# Patient Record
Sex: FEMALE | Race: WHITE | NOT HISPANIC OR LATINO | Employment: OTHER | URBAN - METROPOLITAN AREA
[De-identification: names, ages, dates, MRNs, and addresses within clinical notes are randomized per-mention and may not be internally consistent; named-entity substitution may affect disease eponyms.]

---

## 2017-01-31 ENCOUNTER — PATIENT OUTREACH (OUTPATIENT)
Dept: HEALTH INFORMATION MANAGEMENT | Facility: OTHER | Age: 82
End: 2017-01-31

## 2017-01-31 ENCOUNTER — TELEPHONE (OUTPATIENT)
Dept: HEALTH INFORMATION MANAGEMENT | Facility: OTHER | Age: 82
End: 2017-01-31

## 2017-01-31 NOTE — PROGRESS NOTES
Outcome:SCHEDULED AWV    Attempt # 1ST    Annual Wellness Visit Scheduling  Scheduling Status: Scheduled    Care Gap Scheduling (Attempt to Schedule EACH Overdue Care Gap!)  Health Maintenance Due   Topic Date Due   • PFT SCREENING-FEV1 AND FEV/FVC RATIO / SPIROMETRY SHOULD BE PERFORMED ANNUALLY  NA   • IMM DTaP/Tdap/Td Vaccine (1 - Tdap) DID NOT DISCUSS PT HUNG UP   • PAP SMEAR  75+ SENT TO PCP   • IMM ZOSTER VACCINE  DID NOT DISCUSS PT HUNG UP   • IMM PNEUMOCOCCAL 65+ (ADULT) LOW/MEDIUM RISK SERIES (1 of 2 - PCV13) DID NOT DISCUSS PT HUNG UP   • Annual Wellness Visit  SCHEDULED APPT   • IMM INFLUENZA (1) DID NOT DISCUSS PT HUNG UP         MyChart Activation:  Already Active/Declined/Sent Activation Code

## 2017-01-31 NOTE — TELEPHONE ENCOUNTER
Patient is over the age of 75 and showing overdue for PAP SMEAR.    Please reply to this message as to whether these tests are appropriate and I will update the Health Maintenance Topic or contact the patient to schedule.

## 2017-02-07 ENCOUNTER — OFFICE VISIT (OUTPATIENT)
Dept: MEDICAL GROUP | Facility: MEDICAL CENTER | Age: 82
End: 2017-02-07
Payer: MEDICARE

## 2017-02-07 VITALS
BODY MASS INDEX: 23.74 KG/M2 | SYSTOLIC BLOOD PRESSURE: 162 MMHG | TEMPERATURE: 97.9 F | DIASTOLIC BLOOD PRESSURE: 84 MMHG | HEIGHT: 63 IN | HEART RATE: 75 BPM | WEIGHT: 134 LBS | OXYGEN SATURATION: 90 %

## 2017-02-07 DIAGNOSIS — J45.40 ASTHMA, MODERATE PERSISTENT, POORLY-CONTROLLED: ICD-10-CM

## 2017-02-07 DIAGNOSIS — R11.0 NAUSEA: ICD-10-CM

## 2017-02-07 DIAGNOSIS — R35.0 URINARY FREQUENCY: ICD-10-CM

## 2017-02-07 DIAGNOSIS — Z91.81 AT RISK FOR FALLS: ICD-10-CM

## 2017-02-07 DIAGNOSIS — Z00.00 MEDICARE ANNUAL WELLNESS VISIT, SUBSEQUENT: Primary | ICD-10-CM

## 2017-02-07 DIAGNOSIS — G47.34 NOCTURNAL HYPOXIA: ICD-10-CM

## 2017-02-07 DIAGNOSIS — I10 ESSENTIAL HYPERTENSION: ICD-10-CM

## 2017-02-07 DIAGNOSIS — N18.30 CHRONIC KIDNEY DISEASE, STAGE III (MODERATE) (HCC): ICD-10-CM

## 2017-02-07 DIAGNOSIS — N39.0 RECURRENT UTI: ICD-10-CM

## 2017-02-07 DIAGNOSIS — F43.21 GRIEF REACTION: ICD-10-CM

## 2017-02-07 DIAGNOSIS — E55.9 VITAMIN D INSUFFICIENCY: ICD-10-CM

## 2017-02-07 DIAGNOSIS — N28.9 RENAL INSUFFICIENCY: ICD-10-CM

## 2017-02-07 PROCEDURE — 1036F TOBACCO NON-USER: CPT | Performed by: NURSE PRACTITIONER

## 2017-02-07 PROCEDURE — G8510 SCR DEP NEG, NO PLAN REQD: HCPCS | Performed by: NURSE PRACTITIONER

## 2017-02-07 PROCEDURE — G0439 PPPS, SUBSEQ VISIT: HCPCS | Performed by: NURSE PRACTITIONER

## 2017-02-07 ASSESSMENT — PATIENT HEALTH QUESTIONNAIRE - PHQ9
5. POOR APPETITE OR OVEREATING: 1 - SEVERAL DAYS
SUM OF ALL RESPONSES TO PHQ QUESTIONS 1-9: 13
CLINICAL INTERPRETATION OF PHQ2 SCORE: 3

## 2017-02-07 NOTE — ASSESSMENT & PLAN NOTE
Chronic condition managed with current medical regimen  Intermit occurrence  Followed by Roberto Cota M.D..

## 2017-02-07 NOTE — ASSESSMENT & PLAN NOTE
Chronic condition managed with current medical regimen  Diet managed  Followed by Roberto Cota M.D..

## 2017-02-07 NOTE — ASSESSMENT & PLAN NOTE
Per pt unable to determine the cause of nausea, when occurs short lived and med effective  Followed by Roberto Cota M.D..

## 2017-02-07 NOTE — ASSESSMENT & PLAN NOTE
"Pt states has had increasing fatigue during the winter weather  dtr states since loss of her dog 10/2016 more tired and \"sad\"  "

## 2017-02-07 NOTE — ASSESSMENT & PLAN NOTE
/84  Chronic condition managed with current medical regimen  Stable per review   Continue with current meds  Followed by Roberto Cota M.D..

## 2017-02-07 NOTE — ASSESSMENT & PLAN NOTE
Chronic, intermit condition managed with current medical regimen  Followed by Roberto Cota M.D..

## 2017-02-07 NOTE — ASSESSMENT & PLAN NOTE
Chronic condition managed with current medical regimen  No current labs for revieew  Followed by Roberto Cota M.D..

## 2017-02-07 NOTE — ASSESSMENT & PLAN NOTE
Chronic condition managed with current medical regimen  SOB with mild exertion  Hs O2 2.5 L   Continue with current meds  Followed by Roberto Cota M.D..

## 2017-02-07 NOTE — MR AVS SNAPSHOT
"Kareenprema FOX Shukri   2017 1:00 PM   Office Visit   MRN: [id number]    Department:  [facility]   Dept Phone:  [phone]    Description:  Female : 1921   Provider:  DICK Smith           Reason for Visit     Annual Exam initial      Allergies as of 2017     Allergen Noted Reactions    Codeine 2009   Nausea      You were diagnosed with     Medicare annual wellness visit, subsequent   [019351]  -  Primary     Asthma, moderate persistent, poorly-controlled   [615432]       Essential hypertension   [0125452]       Vitamin D insufficiency   [721343]       Renal insufficiency   [374636]       Urinary frequency   [788.41.ICD-9-CM]       Chronic kidney disease, stage III (moderate)   [585.3.ICD-9-CM]       Grief reaction   [653956]       Nocturnal hypoxia   [084527]       At risk for falls   [889724]       Recurrent UTI   [927417]       Nausea   [914620]         Vital Signs     Blood Pressure Pulse Temperature Height Weight Body Mass Index    162/84 mmHg 75 36.6 °C (97.9 °F) 1.6 m (5' 3\") 60.782 kg (134 lb) 23.74 kg/m2    Oxygen Saturation Smoking Status                90% Former Smoker          Basic Information     Date Of Birth Sex Race Ethnicity Preferred Language    1921 Female White Non- English      Problem List              ICD-10-CM Priority Class Noted - Resolved    Asthma, moderate persistent, poorly-controlled J45.40   2010 - Present    COPD (chronic obstructive pulmonary disease) (CMS-HCC) J44.9   2010 - Present    HTN (hypertension) I10   2010 - Present    Vitamin D insufficiency E55.9   2010 - Present    Depression F32.9   2010 - Present    Fatigue R53.83   2012 - Present    Hyperlipidemia E78.5   2012 - Present    Grief reaction F43.20   2013 - Present    Insomnia G47.00   2014 - Present    Nocturnal hypoxia G47.34   2014 - Present    Urinary frequency R35.0   2014 - Present    Constipation K59.00   " 3/2/2015 - Present    At risk for falls Z91.81   7/10/2015 - Present    Recurrent UTI N39.0   8/18/2015 - Present    Nausea R11.0   8/18/2015 - Present    Chronic kidney disease, stage III (moderate) N18.3   8/18/2015 - Present      Health Maintenance        Date Due Completion Dates    IMM DTaP/Tdap/Td Vaccine (1 - Tdap) 2/5/1940 ---    IMM ZOSTER VACCINE 2/5/1981 ---    IMM PNEUMOCOCCAL 65+ (ADULT) LOW/MEDIUM RISK SERIES (1 of 2 - PCV13) 2/5/1986 ---    IMM INFLUENZA (1) 9/1/2016 11/18/2014, 10/16/2012, 12/3/2010            Current Immunizations     Influenza TIV (IM) 10/16/2012  3:50 PM    Influenza Vaccine Adult HD 11/18/2014  5:07 PM    Influenza Vaccine Pediatric 12/3/2010      Below and/or attached are the medications your provider expects you to take. Review all of your home medications and newly ordered medications with your provider and/or pharmacist. Follow medication instructions as directed by your provider and/or pharmacist. Please keep your medication list with you and share with your provider. Update the information when medications are discontinued, doses are changed, or new medications (including over-the-counter products) are added; and carry medication information at all times in the event of emergency situations     Allergies:  CODEINE - Nausea               Medications  Valid as of: February 07, 2017 -  1:35 PM    Generic Name Brand Name Tablet Size Instructions for use    Albuterol Sulfate (Aero Soln) albuterol 108 (90 BASE) MCG/ACT Inhale 2 Puffs by mouth every 6 hours as needed.        AmLODIPine Besylate (Tab) NORVASC 5 MG Take 1 Tab by mouth 2 Times a Day.        Cholecalciferol (Cap) Vitamin D 2000 UNITS Take 1 Cap by mouth every day.        Lisinopril (Tab) PRINIVIL, ZESTRIL 40 MG Take 1.5 Tabs by mouth every morning.        Ondansetron HCl (Tab) ZOFRAN 4 MG Take 1 Tab by mouth every four hours as needed for Nausea/Vomiting.        PredniSONE (Tab) DELTASONE 5 MG TAKE 1 TAB BY MOUTH  EVERY MORNING WITH BREAKFAST.        Sertraline HCl (Tab) ZOLOFT 100 MG Take 1.5 Tabs by mouth every day.        TraZODone HCl (Tab) DESYREL 100 MG Take 1 Tab by mouth at bedtime as needed for Sleep (take with a snack).        .                 Medicines prescribed today were sent to:     [id number] - [place], [place] - [address]    [address] [place] [address]    Phone: [phone] Fax: [phone]    Open 24 Hours?: No    [id number] - [place], [place] - [address]    [address] [place] [address]    Phone: [phone] Fax: [phone]    Open 24 Hours?: No      Medication refill instructions:       If your prescription bottle indicates you have medication refills left, it is not necessary to call your provider’s office. Please contact your pharmacy and they will refill your medication.    If your prescription bottle indicates you do not have any refills left, you may request refills at any time through one of the following ways: The online [employer] system (except Urgent Care), by calling your provider’s office, or by asking your pharmacy to contact your provider’s office with a refill request. Medication refills are processed only during regular business hours and may not be available until the next business day. Your provider may request additional information or to have a follow-up visit with you prior to refilling your medication.   *Please Note: Medication refills are assigned a new Rx number when refilled electronically. Your pharmacy may indicate that no refills were authorized even though a new prescription for the same medication is available at the pharmacy. Please request the medicine by name with the pharmacy before contacting your provider for a refill.        Instructions    Continue with care through Roberto Cota M.D..  Next Medicare Annual Wellness Visit is due in one year.    Continue care with specialists you are seeing for your chronic problems.    Attached is  some preventative information for you to read.          Fall Prevention and Home Safety  Falls cause injuries and can affect all age groups. It is possible to prevent falls.   HOW TO PREVENT FALLS  · Wear shoes with rubber soles that do not have an opening for your toes.   · Keep the inside and outside of your house well lit.   · Use night lights throughout your home.   · Remove clutter from floors.   · Clean up floor spills.   · Remove throw rugs or fasten them to the floor with carpet tape.   · Do not place electrical cords across pathways.   · Put grab bars by your tub, shower, and toilet. Do not use towel bars as grab bars.   · Put handrails on both sides of the stairway. Fix loose handrails.   · Do not climb on stools or stepladders, if possible.   · Do not wax your floors.   · Repair uneven or unsafe sidewalks, walkways, or stairs.   · Keep items you use a lot within reach.   · Be aware of pets.   · Keep emergency numbers next to the telephone.   · Put smoke detectors in your home and near bedrooms.   Ask your doctor what other things you can do to prevent falls.  Document Released: 10/14/2010 Document Revised: 06/18/2013 Document Reviewed: 03/19/2013  ExitCare® Patient Information ©2013 [employer].    Exercise to Stay Healthy      Exercise helps you become and stay healthy.  EXERCISE IDEAS AND TIPS  Choose exercises that:  · You enjoy.   · Fit into your day.   You do not need to exercise really hard to be healthy. You can do exercises at a slow or medium level and stay healthy. You can:  · Stretch before and after working out.   · Try yoga, Pilates, or aby chi.   · Lift weights.   · Walk fast, swim, jog, run, climb stairs, bicycle, dance, or rollerskate.   · Take aerobic classes.   Exercises that burn about 150 calories:  · Running 1 ½ miles in 15 minutes.   · Playing volleyball for 45 to 60 minutes.   · Washing and waxing a car for 45 to 60 minutes.   · Playing touch football for 45 minutes.   · Walking 1  ¾ miles in 35 minutes.   · Pushing a stroller 1 ½ miles in 30 minutes.   · Playing basketball for 30 minutes.   · Raking leaves for 30 minutes.   · Bicycling 5 miles in 30 minutes.   · Walking 2 miles in 30 minutes.   · Dancing for 30 minutes.   · Shoveling snow for 15 minutes.   · Swimming laps for 20 minutes.   · Walking up stairs for 15 minutes.   · Bicycling 4 miles in 15 minutes.   · Gardening for 30 to 45 minutes.   · Jumping rope for 15 minutes.   · Washing windows or floors for 45 to 60 minutes.     One suggestion is to start walking for 10 minutes after each meal.  This will help with digestion and help you to metabolize your meal.       For Weight Loss -   Recommend portion sizes with more fruits/vegetables/high fiber foods.  Stay away from white bread/pastas/white rice/white potatoes.  Increase Fluid intake to 6-8 glasses of water daily.   Eliminate soda's (diet and regular) from your fluid intake.      Document Released: 01/20/2012 Document Revised: 03/11/2013 Document Reviewed: 01/20/2012  ExitCare® Patient Information ©2013 [employer].    Fat and Cholesterol Control Diet  Cholesterol is a wax-like substance. It comes from your liver and is found in certain foods. There is good (HDL) and bad (LDL) cholesterol. Too much cholesterol in your blood can affect your heart. Certain foods can lower or raise your cholesterol. Eat foods that are low in cholesterol.  Saturated and trans fats are bad fats found in foods that will raise your cholesterol. Do not eat foods that are high in saturated and trans fats.  FOODS HIGHER IN SATURATED AND TRANS FATS  · Dairy products, such as whole milk, eggs, cheese, cream, and butter.   · Fatty meats, such as hot dogs, sausage, and salami.   · Fried foods.   · Trans fats which are found in margarine and pre-made cookies, crackers, and baked goods.   · Tropical oils, such as coconut and palm oils.   Read package labels at the store. Do not buy products that use saturated or  trans fats or hydrogenated oils. Find foods labeled:  · Low-fat.   · Low-saturated fat.   · Trans-fat-free.   · Low-cholesterol.   FOODS LOWER IN CHOLESTEROL  ·  Fruit.   · Vegetables.   · Beans, peas, and lentils.   · Fish.   · Lean meat, such as chicken (without skin) or ground turkey.   · Grains, such as barley, rice, couscous, bulgur wheat, and pasta.   · Heart-healthy tub margarine.   PREPARING YOUR FOOD  · Broil, bake, steam, or roast foods. Do not lockwood food.   · Use non-stick cooking sprays.   · Use lemon or herbs to flavor food instead of using butter or stick margarine.   · Use nonfat yogurt, salsa, or low-fat dressings for salads.   Document Released: 06/18/2013 Document Reviewed: 06/18/2013  ExitCare® Patient Information ©2013 [employer].    Recommend annual flu vaccine.  Next due in Fall, 2015.  If you decide not to have the flu vaccine, recommend good handwashing and staying out of crowds during flu season.                    MyChart Status: Patient Declined

## 2017-02-07 NOTE — PROGRESS NOTES
CC:   Medicare Annual Wellness Visit    HPI:  Laney is a 89 y.o. here for Medicare Annual Wellness Visit    Patient Active Problem List    Diagnosis Date Noted   • Recurrent UTI 08/18/2015   • Nausea 08/18/2015   • Chronic kidney disease, stage III (moderate) 08/18/2015   • At risk for falls 07/10/2015   • Constipation 03/02/2015   • Urinary frequency 05/29/2014   • Insomnia 01/30/2014   • Nocturnal hypoxia 01/30/2014   • Grief reaction 04/16/2013   • Hyperlipidemia 12/13/2012   • Renal insufficiency 07/06/2012   • Fatigue 06/09/2012   • Asthma, moderate persistent, poorly-controlled 09/02/2010   • COPD (chronic obstructive pulmonary disease) (CMS-[facility]) 09/02/2010   • HTN (hypertension) 09/02/2010   • Vitamin D insufficiency 09/02/2010   • Depression 09/02/2010     Current Outpatient Prescriptions   Medication Sig Dispense Refill   • predniSONE (DELTASONE) 5 MG Tab TAKE 1 TAB BY MOUTH EVERY MORNING WITH BREAKFAST. 90 Tab 0   • trazodone (DESYREL) 100 MG Tab Take 1 Tab by mouth at bedtime as needed for Sleep (take with a snack). 90 Tab 3   • sertraline (ZOLOFT) 100 MG Tab Take 1.5 Tabs by mouth every day. 135 Tab 2   • lisinopril (PRINIVIL, ZESTRIL) 40 MG tablet Take 1.5 Tabs by mouth every morning. 135 Tab 2   • ondansetron (ZOFRAN) 4 MG Tab tablet Take 1 Tab by mouth every four hours as needed for Nausea/Vomiting. 20 Tab 2   • amlodipine (NORVASC) 5 MG Tab Take 1 Tab by mouth 2 Times a Day. 180 Tab 3   • ondansetron (ZOFRAN) 8 MG Tab Take 1 Tab by mouth every 8 hours as needed for Nausea/Vomiting. 15 Tab 0   • Cholecalciferol (VITAMIN D) 2000 UNIT CAPS Take 1 Cap by mouth every day.     • albuterol (PROAIR HFA) 108 (90 BASE) MCG/ACT AERS Inhale 2 Puffs by mouth every 6 hours as needed. 2 Inhaler 4     No current facility-administered medications for this visit.      Current supplements: no  Chronic narcotic pain medicines: no  Allergies: Codeine  Exercise: as shania  Current social contact/activities: Has support of  family and friends      Screening:  Depression Screening    Little interest or pleasure in doing things?     Feeling down, depressed , or hopeless?    Trouble falling or staying asleep, or sleeping too much?     Feeling tired or having little energy?     Poor appetite or overeating?     Feeling bad about yourself - or that you are a failure or have let yourself or your family down?    Trouble concentrating on things, such as reading the newspaper or watching television?    Moving or speaking so slowly that other people could have noticed.  Or the opposite - being so fidgety or restless that you have been moving around a lot more than usual?     Thoughts that you would be better off dead, or of hurting yourself?     Patient Health Questionnaire Score:      If depressive symptoms identified deferred to follow up visit unless specifically addressed in assesment and plan.      Screening for Cognitive Impairment    Three Minute Recall (banana, sunrise, fence)   /3    Draw clock face with all 12 numbers set to the hand to show 10 minures past 11 o'clock       Cognitive concerns identified defferred for follow up unless specifically addressed in assesment and plan.    Fall Risk Assessment    Has the patient had two or more falls in the last year or any fall with injury in the last year?       Safety Assessment    Throw rugs on floor.     Handrails on all stairs.     Good lighting in all hallways.     Difficulty hearing.     Patient counseled about all safety risks that were identified.    Functional Assessment ADLs    Are there any barriers preventing you from cooking for yourself or meeting nutritional needs?   .    Are there any barriers preventing you from driving safely or obtaining transportation?   .    Are there any barriers preventing you from using a telephone or calling for help?   .    Are there any barriers preventing you from shopping?   .    Are there any barriers preventing you from taking care of your own  finances?   .    Are there any barriers preventing you from managing your medications?   .    Are currently engaing any exercise or physical activity?   .       Health Maintenance Summary                PFT SCREENING-FEV1 AND FEV/FVC RATIO / SPIROMETRY SHOULD BE PERFORMED ANNUALLY Overdue 2/5/1939     IMM DTaP/Tdap/Td Vaccine Overdue 2/5/1940     IMM ZOSTER VACCINE Overdue 2/5/1981     IMM PNEUMOCOCCAL 65+ (ADULT) LOW/MEDIUM RISK SERIES Overdue 2/5/1986     Annual Wellness Visit Overdue 7/10/2016      Done 7/10/2015 Visit Dx: Medicare annual wellness visit, subsequent    IMM INFLUENZA Overdue 9/1/2016      Done 11/18/2014 Imm Admin: Influenza Vaccine Adult HD     Patient has more history with this topic...          Patient Care Team:  Roberto Cota M.D. as PCP - General (Family Medicine)  Daniel Duran M.D. as Consulting Physician (Cardiology)        Social History   Substance Use Topics   • Smoking status: Former Smoker -- 0.25 packs/day for 20 years     Types: Cigarettes     Quit date: 07/10/1965   • Smokeless tobacco: Never Used      Comment: 20 years, 1/2 ppd   • Alcohol Use: 3.0 oz/week     6 Glasses of wine per week       Family History   Problem Relation Age of Onset   • Diabetes Mother    • Asthma Father      She  has a past medical history of Hypertension; ASTHMA; Depression; Insomnia; Hypoxia; and Arthritis.   Past Surgical History   Procedure Laterality Date   • Hysterectomy, total abdominal       Complete, FREDDY/BSO   • Appendectomy     • Tonsillectomy         ROS:    Ostomy or other tubes or amputations: no  Chronic oxygen use yes  Last eye exam 2016  : Denies incontinence.   Gait: Uses a cane   Hearing good.    Dentition good    Exam: There were no vitals taken for this visit. There is no weight on file to calculate BMI.  Alert, oriented in no acute distress.  Eye contact is good, speech goal directed, affect calm      Assessment and Plan. The following treatment and monitoring plan is recommended  "for all problems as listed below:   No problem-specific assessment & plan notes found for this encounter.      Health Care Screening recommendations reviewed with patient today: per Patient Instructions  DPA/Advanced directive: .has a living will    Next office visit for recheck of chronic medical conditions is due with Roberto Cota M.D. in 4-6 months        Pt unable to receive flu and pneumonia shots, currently pr dtr \"with a bad cold, cough\"  "

## 2017-02-07 NOTE — PATIENT INSTRUCTIONS
Continue with care through Roberto Cota M.D..  Next Medicare Annual Wellness Visit is due in one year.    Continue care with specialists you are seeing for your chronic problems.    Attached is some preventative information for you to read.          Fall Prevention and Home Safety  Falls cause injuries and can affect all age groups. It is possible to prevent falls.   HOW TO PREVENT FALLS  · Wear shoes with rubber soles that do not have an opening for your toes.   · Keep the inside and outside of your house well lit.   · Use night lights throughout your home.   · Remove clutter from floors.   · Clean up floor spills.   · Remove throw rugs or fasten them to the floor with carpet tape.   · Do not place electrical cords across pathways.   · Put grab bars by your tub, shower, and toilet. Do not use towel bars as grab bars.   · Put handrails on both sides of the stairway. Fix loose handrails.   · Do not climb on stools or stepladders, if possible.   · Do not wax your floors.   · Repair uneven or unsafe sidewalks, walkways, or stairs.   · Keep items you use a lot within reach.   · Be aware of pets.   · Keep emergency numbers next to the telephone.   · Put smoke detectors in your home and near bedrooms.   Ask your doctor what other things you can do to prevent falls.  Document Released: 10/14/2010 Document Revised: 06/18/2013 Document Reviewed: 03/19/2013  ExitCare® Patient Information ©2013 [employer].    Exercise to Stay Healthy      Exercise helps you become and stay healthy.  EXERCISE IDEAS AND TIPS  Choose exercises that:  · You enjoy.   · Fit into your day.   You do not need to exercise really hard to be healthy. You can do exercises at a slow or medium level and stay healthy. You can:  · Stretch before and after working out.   · Try yoga, Pilates, or aby chi.   · Lift weights.   · Walk fast, swim, jog, run, climb stairs, bicycle, dance, or rollerskate.   · Take aerobic classes.   Exercises that burn about 150  calories:  · Running 1 ½ miles in 15 minutes.   · Playing volleyball for 45 to 60 minutes.   · Washing and waxing a car for 45 to 60 minutes.   · Playing touch football for 45 minutes.   · Walking 1 ¾ miles in 35 minutes.   · Pushing a stroller 1 ½ miles in 30 minutes.   · Playing basketball for 30 minutes.   · Raking leaves for 30 minutes.   · Bicycling 5 miles in 30 minutes.   · Walking 2 miles in 30 minutes.   · Dancing for 30 minutes.   · Shoveling snow for 15 minutes.   · Swimming laps for 20 minutes.   · Walking up stairs for 15 minutes.   · Bicycling 4 miles in 15 minutes.   · Gardening for 30 to 45 minutes.   · Jumping rope for 15 minutes.   · Washing windows or floors for 45 to 60 minutes.     One suggestion is to start walking for 10 minutes after each meal.  This will help with digestion and help you to metabolize your meal.       For Weight Loss -   Recommend portion sizes with more fruits/vegetables/high fiber foods.  Stay away from white bread/pastas/white rice/white potatoes.  Increase Fluid intake to 6-8 glasses of water daily.   Eliminate soda's (diet and regular) from your fluid intake.      Document Released: 01/20/2012 Document Revised: 03/11/2013 Document Reviewed: 01/20/2012  ExitCare® Patient Information ©2013 [employer].    Fat and Cholesterol Control Diet  Cholesterol is a wax-like substance. It comes from your liver and is found in certain foods. There is good (HDL) and bad (LDL) cholesterol. Too much cholesterol in your blood can affect your heart. Certain foods can lower or raise your cholesterol. Eat foods that are low in cholesterol.  Saturated and trans fats are bad fats found in foods that will raise your cholesterol. Do not eat foods that are high in saturated and trans fats.  FOODS HIGHER IN SATURATED AND TRANS FATS  · Dairy products, such as whole milk, eggs, cheese, cream, and butter.   · Fatty meats, such as hot dogs, sausage, and salami.   · Fried foods.   · Trans fats which  are found in margarine and pre-made cookies, crackers, and baked goods.   · Tropical oils, such as coconut and palm oils.   Read package labels at the store. Do not buy products that use saturated or trans fats or hydrogenated oils. Find foods labeled:  · Low-fat.   · Low-saturated fat.   · Trans-fat-free.   · Low-cholesterol.   FOODS LOWER IN CHOLESTEROL  ·  Fruit.   · Vegetables.   · Beans, peas, and lentils.   · Fish.   · Lean meat, such as chicken (without skin) or ground turkey.   · Grains, such as barley, rice, couscous, bulgur wheat, and pasta.   · Heart-healthy tub margarine.   PREPARING YOUR FOOD  · Broil, bake, steam, or roast foods. Do not lockwood food.   · Use non-stick cooking sprays.   · Use lemon or herbs to flavor food instead of using butter or stick margarine.   · Use nonfat yogurt, salsa, or low-fat dressings for salads.   Document Released: 06/18/2013 Document Reviewed: 06/18/2013  ExitCare® Patient Information ©2013 [employer].    Recommend annual flu vaccine.  Next due in Fall, 2015.  If you decide not to have the flu vaccine, recommend good handwashing and staying out of crowds during flu season.

## 2017-02-07 NOTE — ASSESSMENT & PLAN NOTE
Chronic condition managed with current medical regimen  O2 2.5 L at hs  Followed by Roberto Cota M.D..

## 2017-02-07 NOTE — ASSESSMENT & PLAN NOTE
"Chronic condition managed with current medical regimen  Per dtr and pt \"not always very good\"  Used her inhaler during this visit with good results   Continue with current meds  Followed by Roberto Cota M.D..      "

## 2017-02-07 NOTE — ASSESSMENT & PLAN NOTE
Chronic condition managed with current medical regimen  Stable per review   Continue with current meds  Followed by Roberto Cota M.D..

## 2017-02-17 RX ORDER — TRAZODONE HYDROCHLORIDE 100 MG/1
200 TABLET ORAL
Qty: 180 TAB | Refills: 3 | Status: SHIPPED | OUTPATIENT
Start: 2017-02-17 | End: 2018-01-30 | Stop reason: SDUPTHER

## 2017-02-17 NOTE — TELEPHONE ENCOUNTER
Was the patient seen in the last year in this department? Yes     Does patient have an active prescription for medications requested? No     Received Request Via: Pharmacy     FYI: Pharmacy states pt has been taking 2 tab po every night. Pt needs a refill, and would like to if the directions can be changed for the medication to be used 2 tab every night prn. Please advise

## 2017-02-23 RX ORDER — PREDNISONE 5 MG/1
TABLET ORAL
Qty: 90 TAB | Refills: 0 | Status: SHIPPED | OUTPATIENT
Start: 2017-02-23 | End: 2017-05-31 | Stop reason: SDUPTHER

## 2017-02-23 NOTE — Clinical Note
February 23, 2017        Laney Watt  [address]  [place] [address]        Dear Laney:    .This letter is to inform you the you are due for an annual appointment. Please contact our scheduling department at [phone] To schedule       If you have any questions or concerns, please don't hesitate to call.        Sincerely,        Roberto Cota M.D.    Electronically Signed

## 2017-02-23 NOTE — TELEPHONE ENCOUNTER
Refill done. Patient is due for annual appointment. Please have patient schedule.  Roberto Cota M.D.

## 2017-04-05 DIAGNOSIS — I10 ESSENTIAL HYPERTENSION: ICD-10-CM

## 2017-04-05 RX ORDER — AMLODIPINE BESYLATE 5 MG/1
5 TABLET ORAL 2 TIMES DAILY
Qty: 180 TAB | Refills: 3 | Status: SHIPPED | OUTPATIENT
Start: 2017-04-05 | End: 2018-01-10 | Stop reason: SDUPTHER

## 2017-04-05 NOTE — TELEPHONE ENCOUNTER
Was the patient seen in the last year in this department? No    Does patient have an active prescription for medications requested? No     Received Request Via: Pharmacy     Last seen: 02/02/2016 Slots

## 2017-04-18 ENCOUNTER — OFFICE VISIT (OUTPATIENT)
Dept: MEDICAL GROUP | Facility: MEDICAL CENTER | Age: 82
End: 2017-04-18
Payer: MEDICARE

## 2017-04-18 VITALS
DIASTOLIC BLOOD PRESSURE: 82 MMHG | OXYGEN SATURATION: 91 % | BODY MASS INDEX: 24.98 KG/M2 | HEIGHT: 63 IN | WEIGHT: 141 LBS | HEART RATE: 64 BPM | SYSTOLIC BLOOD PRESSURE: 134 MMHG | TEMPERATURE: 97.6 F

## 2017-04-18 DIAGNOSIS — J06.9 VIRAL UPPER RESPIRATORY TRACT INFECTION: ICD-10-CM

## 2017-04-18 LAB
FLUAV+FLUBV AG SPEC QL IA: NORMAL
INT CON NEG: NEGATIVE
INT CON POS: POSITIVE

## 2017-04-18 PROCEDURE — 99213 OFFICE O/P EST LOW 20 MIN: CPT | Performed by: FAMILY MEDICINE

## 2017-04-18 PROCEDURE — 4040F PNEUMOC VAC/ADMIN/RCVD: CPT | Mod: 8P | Performed by: FAMILY MEDICINE

## 2017-04-18 PROCEDURE — 1036F TOBACCO NON-USER: CPT | Performed by: FAMILY MEDICINE

## 2017-04-18 PROCEDURE — G8420 CALC BMI NORM PARAMETERS: HCPCS | Performed by: FAMILY MEDICINE

## 2017-04-18 PROCEDURE — G8432 DEP SCR NOT DOC, RNG: HCPCS | Performed by: FAMILY MEDICINE

## 2017-04-18 PROCEDURE — 1101F PT FALLS ASSESS-DOCD LE1/YR: CPT | Performed by: FAMILY MEDICINE

## 2017-04-18 PROCEDURE — 87804 INFLUENZA ASSAY W/OPTIC: CPT | Performed by: FAMILY MEDICINE

## 2017-04-18 RX ORDER — AZITHROMYCIN 250 MG/1
TABLET, FILM COATED ORAL
Qty: 6 TAB | Refills: 0 | Status: SHIPPED | OUTPATIENT
Start: 2017-04-18 | End: 2017-04-23

## 2017-04-18 NOTE — MR AVS SNAPSHOT
"Hillarylarissa FOX Shukri   2017 1:40 PM   Office Visit   MRN: [id number]    Department:  [facility]   Dept Phone:  [phone]    Description:  Female : 1921   Provider:  Monik Mccall M.D.           Reason for Visit     Ear Fullness     Pharyngitis           Allergies as of 2017     Allergen Noted Reactions    Codeine 2009   Nausea      You were diagnosed with     Viral upper respiratory tract infection   [[id number]]         Vital Signs     Blood Pressure Pulse Temperature Height Weight Body Mass Index    134/82 mmHg 64 36.4 °C (97.6 °F) 1.6 m (5' 2.99\") 63.957 kg (141 lb) 24.98 kg/m2    Oxygen Saturation Breastfeeding? Smoking Status             91% No Former Smoker         Basic Information     Date Of Birth Sex Race Ethnicity Preferred Language    1921 Female White Non- English      Problem List              ICD-10-CM Priority Class Noted - Resolved    Asthma, moderate persistent, poorly-controlled J45.40   2010 - Present    COPD (chronic obstructive pulmonary disease) (CMS-HCC) J44.9   2010 - Present    HTN (hypertension) I10   2010 - Present    Vitamin D insufficiency E55.9   2010 - Present    Depression F32.9   2010 - Present    Fatigue R53.83   2012 - Present    Hyperlipidemia E78.5   2012 - Present    Grief reaction F43.20   2013 - Present    Insomnia G47.00   2014 - Present    Nocturnal hypoxia G47.34   2014 - Present    Urinary frequency R35.0   2014 - Present    Constipation K59.00   3/2/2015 - Present    At risk for falls Z91.81   7/10/2015 - Present    Recurrent UTI N39.0   2015 - Present    Nausea R11.0   2015 - Present    Chronic kidney disease, stage III (moderate) N18.3   2015 - Present    Viral upper respiratory tract infection J06.9, B97.89   2017 - Present      Health Maintenance        Date Due Completion Dates    IMM DTaP/Tdap/Td Vaccine (1 - Tdap) 1940 ---    IMM ZOSTER VACCINE " 2/5/1981 ---    IMM PNEUMOCOCCAL 65+ (ADULT) LOW/MEDIUM RISK SERIES (1 of 2 - PCV13) 2/5/1986 ---            Results     POCT Influenza A/B      Component    Rapid Influenza A-B    NEG    Internal Control Positive    Positive    Internal Control Negative    Negative                        Current Immunizations     Influenza TIV (IM) 10/16/2012  3:50 PM    Influenza Vaccine Adult HD 11/18/2014  5:07 PM    Influenza Vaccine Pediatric 12/3/2010      Below and/or attached are the medications your provider expects you to take. Review all of your home medications and newly ordered medications with your provider and/or pharmacist. Follow medication instructions as directed by your provider and/or pharmacist. Please keep your medication list with you and share with your provider. Update the information when medications are discontinued, doses are changed, or new medications (including over-the-counter products) are added; and carry medication information at all times in the event of emergency situations     Allergies:  CODEINE - Nausea               Medications  Valid as of: April 18, 2017 -  3:04 PM    Generic Name Brand Name Tablet Size Instructions for use    Albuterol Sulfate (Aero Soln) albuterol 108 (90 BASE) MCG/ACT Inhale 2 Puffs by mouth every 6 hours as needed.        AmLODIPine Besylate (Tab) NORVASC 5 MG Take 1 Tab by mouth 2 Times a Day.        Azithromycin (Tab) ZITHROMAX 250 MG 2 tabs by mouth day 1, 1 tab by mouth days 2-5        Cholecalciferol (Cap) Vitamin D 2000 UNITS Take 1 Cap by mouth every day.        Lisinopril (Tab) PRINIVIL, ZESTRIL 40 MG Take 1.5 Tabs by mouth every morning.        Ondansetron HCl (Tab) ZOFRAN 4 MG Take 1 Tab by mouth every four hours as needed for Nausea/Vomiting.        PredniSONE (Tab) DELTASONE 5 MG TAKE 1 TAB BY MOUTH EVERY MORNING WITH BREAKFAST.        Sertraline HCl (Tab) ZOLOFT 100 MG Take 1.5 Tabs by mouth every day.        TraZODone HCl (Tab) DESYREL 100 MG Take 2 Tabs  by mouth at bedtime as needed for Sleep (take with a snack).        .                 Medicines prescribed today were sent to:     [id number] - [place], [place] - [address]    [address] [place] [address]    Phone: [phone] Fax: [phone]    Open 24 Hours?: No    [id number] - [place], [place] - [address]    [address] [place] [address]    Phone: [phone] Fax: [phone]    Open 24 Hours?: No      Medication refill instructions:       If your prescription bottle indicates you have medication refills left, it is not necessary to call your provider’s office. Please contact your pharmacy and they will refill your medication.    If your prescription bottle indicates you do not have any refills left, you may request refills at any time through one of the following ways: The online [facility] system (except Urgent Care), by calling your provider’s office, or by asking your pharmacy to contact your provider’s office with a refill request. Medication refills are processed only during regular business hours and may not be available until the next business day. Your provider may request additional information or to have a follow-up visit with you prior to refilling your medication.   *Please Note: Medication refills are assigned a new Rx number when refilled electronically. Your pharmacy may indicate that no refills were authorized even though a new prescription for the same medication is available at the pharmacy. Please request the medicine by name with the pharmacy before contacting your provider for a refill.           MyChart Status: Patient Declined

## 2017-04-18 NOTE — ASSESSMENT & PLAN NOTE
Illness: 3 days of illness but really became apparent today including: nasal congestion, clear/whitish rhinorrhea, sore throat, myalgias, post nasal drainage with some cough  Symptoms negative for fever, chills, night sweats,   Treatments tried: none   Since onset, symptoms are much worse   Similarly ill exposures: yes  Medical history positive for asthma  She  reports that she quit smoking about 51 years ago. Her smoking use included Cigarettes. She has a 5 pack-year smoking history. She has never used smokeless tobacco.

## 2017-04-19 NOTE — PROGRESS NOTES
Subjective:     Chief Complaint   Patient presents with   • Ear Fullness   • Pharyngitis       Laney Watt is a 89 y.o. female here today for evaluation and management of:    Viral upper respiratory tract infection  Illness: 3 days of illness but really became apparent today including: nasal congestion, clear/whitish rhinorrhea, sore throat, myalgias, post nasal drainage with some cough  Symptoms negative for fever, chills, night sweats,   Treatments tried: none   Since onset, symptoms are much worse   Similarly ill exposures: yes  Medical history positive for asthma  She  reports that she quit smoking about 51 years ago. Her smoking use included Cigarettes. She has a 5 pack-year smoking history. She has never used smokeless tobacco.           Allergies   Allergen Reactions   • Codeine Nausea       Current medicines (including changes today)  Current Outpatient Prescriptions   Medication Sig Dispense Refill   • azithromycin (ZITHROMAX) 250 MG Tab 2 tabs by mouth day 1, 1 tab by mouth days 2-5 6 Tab 0   • amlodipine (NORVASC) 5 MG Tab Take 1 Tab by mouth 2 Times a Day. 180 Tab 3   • predniSONE (DELTASONE) 5 MG Tab TAKE 1 TAB BY MOUTH EVERY MORNING WITH BREAKFAST. 90 Tab 0   • trazodone (DESYREL) 100 MG Tab Take 2 Tabs by mouth at bedtime as needed for Sleep (take with a snack). 180 Tab 3   • sertraline (ZOLOFT) 100 MG Tab Take 1.5 Tabs by mouth every day. 135 Tab 2   • lisinopril (PRINIVIL, ZESTRIL) 40 MG tablet Take 1.5 Tabs by mouth every morning. 135 Tab 2   • ondansetron (ZOFRAN) 4 MG Tab tablet Take 1 Tab by mouth every four hours as needed for Nausea/Vomiting. 20 Tab 2   • albuterol (PROAIR HFA) 108 (90 BASE) MCG/ACT AERS Inhale 2 Puffs by mouth every 6 hours as needed. 2 Inhaler 4   • Cholecalciferol (VITAMIN D) 2000 UNIT CAPS Take 1 Cap by mouth every day.       No current facility-administered medications for this visit.       She  has a past medical history of Hypertension; ASTHMA; Depression; Insomnia;  "Hypoxia; and Arthritis.    Patient Active Problem List    Diagnosis Date Noted   • Viral upper respiratory tract infection 04/18/2017   • Recurrent UTI 08/18/2015   • Nausea 08/18/2015   • Chronic kidney disease, stage III (moderate) 08/18/2015   • At risk for falls 07/10/2015   • Constipation 03/02/2015   • Urinary frequency 05/29/2014   • Insomnia 01/30/2014   • Nocturnal hypoxia 01/30/2014   • Grief reaction 04/16/2013   • Hyperlipidemia 12/13/2012   • Fatigue 06/09/2012   • Asthma, moderate persistent, poorly-controlled 09/02/2010   • COPD (chronic obstructive pulmonary disease) (CMS-[facility]) 09/02/2010   • HTN (hypertension) 09/02/2010   • Vitamin D insufficiency 09/02/2010   • Depression 09/02/2010       ROS   No fever or chills.  No nausea or vomiting.  No chest pain or palpitations.  No pain with urination or hematuria.  No black or bloody stools.       Objective:     Blood pressure 134/82, pulse 64, temperature 36.4 °C (97.6 °F), height 1.6 m (5' 2.99\"), weight 63.957 kg (141 lb), SpO2 91 %, not currently breastfeeding. Body mass index is 24.98 kg/(m^2).   Physical Exam:  Well developed, well nourished.  Alert, oriented in no acute distress.  Eye contact is good, speech goal directed, affect calm  Eyes: conjunctiva non-injected, sclera non-icteric.  Ears: Pinna normal. TM pearly gray.   Nose: Nares are patent. Erythematous mucosa with clear discharge  Mouth: Oral mucous membranes pink and moist with no lesions. Posterior pharynx with mild diffuse erythema without exudate  Neck Supple.  No adenopathy or masses in the neck or supraclavicular regions. No thyromegaly  Lungs: clear to auscultation bilaterally with good excursion. No wheezes or rhonchi  CV: regular rate and rhythm. No murmur  Influenza negative      Assessment and Plan:   The following treatment plan was discussed    1. Viral upper respiratory tract infection  Discussed that this is most likely viral in nature. Given the patient's history of lung " disease and her age we'll get him a written prescription of Zithromax to only start if symptoms worsen. Patient to  return for any respiratory distress. Patient and caregiver state understanding of plan. Symptomatic care  - POCT Influenza A/B  - azithromycin (ZITHROMAX) 250 MG Tab; 2 tabs by mouth day 1, 1 tab by mouth days 2-5  Dispense: 6 Tab; Refill: 0    Any change or worsening of signs or symptoms, patient encouraged to follow-up or report to the emergency room for further evaluation. Patient understands and agrees.    Followup: Return if symptoms worsen or fail to improve.

## 2017-05-09 DIAGNOSIS — I10 ESSENTIAL HYPERTENSION: ICD-10-CM

## 2017-05-09 NOTE — TELEPHONE ENCOUNTER
Was the patient seen in the last year in this department? Yes     Does patient have an active prescription for medications requested? No     Received Request Via: Pharmacy     Last seen: 04/18/2017 Slots

## 2017-05-10 RX ORDER — LISINOPRIL 40 MG/1
60 TABLET ORAL EVERY MORNING
Qty: 135 TAB | Refills: 3 | Status: SHIPPED | OUTPATIENT
Start: 2017-05-10 | End: 2018-05-14 | Stop reason: SDUPTHER

## 2017-05-31 RX ORDER — SERTRALINE HYDROCHLORIDE 100 MG/1
TABLET, FILM COATED ORAL
Qty: 135 TAB | Refills: 3 | Status: SHIPPED | OUTPATIENT
Start: 2017-05-31 | End: 2018-06-08 | Stop reason: SDUPTHER

## 2017-05-31 RX ORDER — PREDNISONE 5 MG/1
TABLET ORAL
Qty: 90 TAB | Refills: 3 | Status: SHIPPED | OUTPATIENT
Start: 2017-05-31 | End: 2018-06-08 | Stop reason: SDUPTHER

## 2017-05-31 NOTE — TELEPHONE ENCOUNTER
Was the patient seen in the last year in this department? Yes     Does patient have an active prescription for medications requested? No     Received Request Via: Pharmacy     Last seen: 4/18/2017 Slots

## 2017-11-06 DIAGNOSIS — R11.0 NAUSEA: ICD-10-CM

## 2017-11-06 RX ORDER — ONDANSETRON 4 MG/1
4 TABLET, FILM COATED ORAL EVERY 4 HOURS PRN
Qty: 20 TAB | Refills: 2 | Status: SHIPPED | OUTPATIENT
Start: 2017-11-06 | End: 2018-11-18

## 2018-01-10 DIAGNOSIS — I10 ESSENTIAL HYPERTENSION: ICD-10-CM

## 2018-01-10 RX ORDER — AMLODIPINE BESYLATE 5 MG/1
5 TABLET ORAL 2 TIMES DAILY
Qty: 180 TAB | Refills: 3 | Status: SHIPPED | OUTPATIENT
Start: 2018-01-10

## 2018-01-30 RX ORDER — TRAZODONE HYDROCHLORIDE 100 MG/1
200 TABLET ORAL
Qty: 180 TAB | Refills: 1 | Status: SHIPPED | OUTPATIENT
Start: 2018-01-30 | End: 2018-07-24

## 2018-01-31 NOTE — TELEPHONE ENCOUNTER
Trazadone    Was the patient seen in the last year in this department? Yes Last Slots 2/22/16  Mccall 4/18/17    Does patient have an active prescription for medications requested? No     Received Request Via: Pharmacy

## 2018-05-14 DIAGNOSIS — I10 ESSENTIAL HYPERTENSION: ICD-10-CM

## 2018-05-14 RX ORDER — LISINOPRIL 40 MG/1
60 TABLET ORAL EVERY MORNING
Qty: 135 TAB | Refills: 0 | Status: SHIPPED | OUTPATIENT
Start: 2018-05-14 | End: 2018-08-24 | Stop reason: SDUPTHER

## 2018-05-14 NOTE — LETTER
May 14, 2018        Laney Watt  [address]  [place] [address]        Dear Laney:    This letter is to inform you the you are due for an annual appointment. Please contact our scheduling department at [phone] To schedule       If you have any questions or concerns, please don't hesitate to call.        Sincerely,        Roberto Cota M.D.    Electronically Signed

## 2018-05-14 NOTE — TELEPHONE ENCOUNTER
Was the patient seen in the last year in this department? Yes     Does patient have an active prescription for medications requested? Yes     Received Request Via: Pharmacy      Pharmacy states that the pt has already run out of this medication

## 2018-06-08 RX ORDER — SERTRALINE HYDROCHLORIDE 100 MG/1
150 TABLET, FILM COATED ORAL
Qty: 135 TAB | Refills: 0 | Status: SHIPPED | OUTPATIENT
Start: 2018-06-08 | End: 2018-08-14 | Stop reason: SDUPTHER

## 2018-06-08 RX ORDER — PREDNISONE 5 MG/1
5 TABLET ORAL
Qty: 90 TAB | Refills: 3 | Status: SHIPPED | OUTPATIENT
Start: 2018-06-08

## 2018-06-08 NOTE — LETTER
June 8, 2018        Laney Watt  [address]  [place] [address]        Dear Laney:    This letter is to inform you the you are due for an annual appointment. Please contact our scheduling department at [phone] To schedule       If you have any questions or concerns, please don't hesitate to call.        Sincerely,        Roberto Cota M.D.    Electronically Signed

## 2018-06-08 NOTE — LETTER
June 8, 2018        Laney Watt  [address]  [place] [address]        Dear Laney:    This letter is to inform you the you are due for an annual appointment. Please contact our scheduling department at [phone] To schedule       If you have any questions or concerns, please don't hesitate to call.        Sincerely,        Roberto Cota M.D.    Electronically Signed

## 2018-07-24 ENCOUNTER — OFFICE VISIT (OUTPATIENT)
Dept: MEDICAL GROUP | Age: 83
End: 2018-07-24
Payer: MEDICARE

## 2018-07-24 ENCOUNTER — TELEPHONE (OUTPATIENT)
Dept: MEDICAL GROUP | Age: 83
End: 2018-07-24

## 2018-07-24 VITALS
OXYGEN SATURATION: 93 % | TEMPERATURE: 97.3 F | DIASTOLIC BLOOD PRESSURE: 80 MMHG | HEART RATE: 70 BPM | SYSTOLIC BLOOD PRESSURE: 132 MMHG

## 2018-07-24 DIAGNOSIS — K59.00 CONSTIPATION, UNSPECIFIED CONSTIPATION TYPE: ICD-10-CM

## 2018-07-24 DIAGNOSIS — F41.9 ANXIETY: ICD-10-CM

## 2018-07-24 DIAGNOSIS — Z97.8 FOLEY CATHETER IN PLACE: ICD-10-CM

## 2018-07-24 DIAGNOSIS — R33.9 URINARY RETENTION: ICD-10-CM

## 2018-07-24 DIAGNOSIS — F51.01 PRIMARY INSOMNIA: ICD-10-CM

## 2018-07-24 PROCEDURE — 99214 OFFICE O/P EST MOD 30 MIN: CPT | Performed by: PHYSICIAN ASSISTANT

## 2018-07-24 RX ORDER — LORAZEPAM 0.5 MG/1
.25-.5 TABLET ORAL 2 TIMES DAILY PRN
Qty: 30 TAB | Refills: 0 | Status: SHIPPED
Start: 2018-07-24 | End: 2018-08-14 | Stop reason: SDUPTHER

## 2018-07-24 NOTE — TELEPHONE ENCOUNTER
Phone Number Called: Roni [phone]    Message: I have spoke to the pt's son, Roni and notified him of the appointment that was made with [facility]. He states that 8 am is to early and to see if they have a later appointment. Or to check with Urgent Care to see if they can remove the catheter.    Left Message for patient to call back: no

## 2018-07-24 NOTE — TELEPHONE ENCOUNTER
Phone Number Called: Kush  [phone]    Message: Urgent care said that they do not have the supplies to remove a catheter.    Left Message for patient to call back: no

## 2018-07-24 NOTE — TELEPHONE ENCOUNTER
Phone Number Called: Roni [phone]    Message: Notified Roni that there is not any later appointments.    Left Message for patient to call back: no

## 2018-07-24 NOTE — TELEPHONE ENCOUNTER
Phone Number Called: Ab Mcgovern [phone]    Message: I have contacted [facility] to see if we can get the patient in asap to remove her catheter. The soonest that they could get her in is on 07/16/2018 at 8:00am. They said that the catheter can stay in for up to 7 days.    Left Message for patient to call back: no      I have also called [facility] and [facility] to see if they can go out to day to the pt's house and remove the catheter. They state that they do not go out for skilled nursing unless it is a continuous issue due to them not making a profit off of one visit.

## 2018-07-24 NOTE — TELEPHONE ENCOUNTER
Phone Number Called: [facility] [phone]    Message: [facility] states that there is no other later appointments. Original appointment has been kept.    Left Message for patient to call back: no

## 2018-07-25 NOTE — PROGRESS NOTES
"Subjective:     Chief Complaint   Patient presents with   • Urinary Retention     unable to urinate  in [place] insterted cath   • Constipation     x3 days only satish Watt is a 89 y.o. female here today for same-day access (Dr. Cota) for ED follow-up on urinary retention and constipation.  Patient was seen in [place] at their hospital on 7/22/2018.  She was discharged same day    Urinary retention/Constipation, unspecified constipation type/Avery in place  Patient is here today with her son and granddaughter.  She primarily would like her Avery catheter removed today in office as she \"just wants it out.\"  Son and granddaughter report overall she is doing really well since discharge.  She has had bowel movements since being home.  Her granddaughter is giving him some her some more natural stool softeners to help with her bowel movements.  Patient denies any recent fever, chills or body aches.  They report that the hospital felt she had some diverticulitis, however, she was not discharged on any medications except for a few stool softeners.  Patient initially went to ED after not having a bowel movement having difficulty urinating for 2 days.  She did not have any painful urination or fever.  Patient has not seen her primary care provider in a couple years.  Her granddaughter states she believes she just canceled her appointments last minute and then fails to follow-up.  Patient reports she is \"miserable\" and would like to \"be done.\"  She reports that she would like to be on hospice, however, there does not seem to be much of an indication this time.  Patient denies any abdominal or flank pain at this time.  Family members are pleased with her health.  She was encouraged to follow-up with urology upon discharge, however, they did not want to be seen in [place] and felt that following up with primary care would be sufficient.  UA was obtained in the ED and was negative.  BUN and creatinine were normal.  " "CT of abdomen and pelvis revealed some diverticulosis, however, no acute abnormalities were noted.    Primary insomnia/Anxiety  Patient's granddaughter reports that she was formerly under the care of psych and that they have been working to get her off of trazodone due to some concerns of interaction with her Zoloft.  Finally were able to wean her off of this medication granddaughter reports her mother has been giving the patient some of her Lorazepam with good success. Requesting PRN dose of lorazepam in place of trazodone to control her anxiety and anxiety induced insomnia. They have no interest in the patient resuming trazodone.       Social update: Patient was formerly living with her daughter, however, reports that she was \"kicked out at 89 years old.  She is pretty better about this and her son and granddaughter present today reports that they are very pleased with her living with them and feel that they are a good pair to be taking care of her.    ROS   Denies any recent fevers or chills. No nausea or vomiting. No diarrhea. No chest pains or shortness of breath. No lower extremity edema.    Allergies   Allergen Reactions   • Codeine Nausea       Current medicines (including changes today)  Current Outpatient Prescriptions   Medication Sig Dispense Refill   • LORazepam (ATIVAN) 0.5 MG Tab Take 0.5-1 Tabs by mouth 2 times a day as needed for Anxiety for up to 14 days. 30 Tab 0   • sertraline (ZOLOFT) 100 MG Tab Take 1.5 Tabs by mouth every day. 135 Tab 0   • predniSONE (DELTASONE) 5 MG Tab Take 1 Tab by mouth every morning with breakfast. 90 Tab 3   • lisinopril (PRINIVIL, ZESTRIL) 40 MG tablet Take 1.5 Tabs by mouth every morning. 135 Tab 0   • amlodipine (NORVASC) 5 MG Tab Take 1 Tab by mouth 2 Times a Day. 180 Tab 3   • ondansetron (ZOFRAN) 4 MG Tab tablet Take 1 Tab by mouth every four hours as needed for Nausea/Vomiting. 20 Tab 2   • albuterol (PROAIR HFA) 108 (90 BASE) MCG/ACT AERS Inhale 2 Puffs by mouth " every 6 hours as needed. 2 Inhaler 4   • Cholecalciferol (VITAMIN D) 2000 UNIT CAPS Take 1 Cap by mouth every day.       No current facility-administered medications for this visit.        Patient Active Problem List    Diagnosis Date Noted   • Viral upper respiratory tract infection 04/18/2017   • Recurrent UTI 08/18/2015   • Nausea 08/18/2015   • Chronic kidney disease, stage III (moderate) 08/18/2015   • At risk for falls 07/10/2015   • Constipation 03/02/2015   • Urinary frequency 05/29/2014   • Insomnia 01/30/2014   • Nocturnal hypoxia 01/30/2014   • Grief reaction 04/16/2013   • Hyperlipidemia 12/13/2012   • Fatigue 06/09/2012   • Asthma, moderate persistent, poorly-controlled 09/02/2010   • COPD (chronic obstructive pulmonary disease) (HCC) 09/02/2010   • HTN (hypertension) 09/02/2010   • Vitamin D insufficiency 09/02/2010   • Depression 09/02/2010       Family History   Problem Relation Age of Onset   • Diabetes Mother    • Asthma Father    • Diabetes Sister           Objective:     Vitals:    07/24/18 1136   BP: 132/80   Pulse: 70   Temp: 36.3 °C (97.3 °F)   SpO2: 93%     Weight refused. Unable to obtain height today in office.     Physical Exam:  Gen: Well developed, well nourished in no acute distress.   Skin: Pink, warm, and dry  HEENT: conjunctiva non-injected, sclera non-icteric. EOMs intact.   Oral mucous membranes pink and moist with no lesions.  Neck: Supple, trachea midline. No adenopathy or masses in the neck or supraclavicular regions.  Lungs: Effort is normal. Clear to auscultation bilaterally with good excursion.  CV: regular rate and rhythm.  Abdomen: soft, nontender, + BS. No HSM.  No CVAT  Ext: no edema, color normal, vascularity normal, temperature normal  Alert and oriented Eye contact is good, speech goal directed, affect calm    Reviewed hospital records from 7/22/18 upon receipt .    Assessment and Plan:   The following treatment plan was discussed:     1. Urinary retention - resolved  currently with albarran. Will coordinate efforts have albarran removed. Home health unable. Urgent care unable. Urgent referral to urology completed. Discussed importance of follow-up with urology regardless.  REFERRAL TO UROLOGY   2. Constipation, unspecified constipation type - resolved. Continue stool softeners as needed. Encouraged hydration and fiber.     3. Albarran catheter in place - to be removed by urology 7/26/18. Placed on 7/22/18. Any issues between now and then to go to ED.  REFERRAL TO UROLOGY   4. Primary insomnia - Stable with daughter's lorazepam. Delfina reviewed. Two week Rx of Ativan written. 2 week follow-up with PCP made.  LORazepam (ATIVAN) 0.5 MG Tab   5. Anxiety- see #4  LORazepam (ATIVAN) 0.5 MG Tab     - Discussed do not have a lot of indication for home health or hospice at this time. However, will set her up with her PCP in a couple weeks for follow-up to have end of life planning discussions at pt request.   - Discussed case with Dr. Alvarez, he examined patient and agreed we didn't have the tools to remove cath in office. Consulted a few physicians with same sentiments. Urology appointment made for 7/26/18.   - HM: not addressed at today's visit.  -Any change or worsening of signs or symptoms, patient encouraged to report to emergency room for further evaluation. Patient's son and granddaughter verbalize understanding and agrees.    Follow-up: 2 weeks with PCP to discuss end of life planning and general follow-up as she has not been seen for over a year. ED          This dictation was created using voice recognition software. The accuracy of the dictation is limited to the abilities of the software. I expect there may be some errors of grammar and possibly content.

## 2018-08-07 ENCOUNTER — TELEPHONE (OUTPATIENT)
Dept: MEDICAL GROUP | Facility: MEDICAL CENTER | Age: 83
End: 2018-08-07

## 2018-08-07 NOTE — TELEPHONE ENCOUNTER
Patient's son states that patient is basically bed-ridden. They cancelled appt due to this. Appt rescheduled for next week when her daughter can bring her in.

## 2018-08-14 ENCOUNTER — HOME HEALTH ADMISSION (OUTPATIENT)
Dept: HOME HEALTH SERVICES | Facility: HOME HEALTHCARE | Age: 83
End: 2018-08-14
Payer: MEDICARE

## 2018-08-14 ENCOUNTER — OFFICE VISIT (OUTPATIENT)
Dept: MEDICAL GROUP | Facility: MEDICAL CENTER | Age: 83
End: 2018-08-14
Payer: MEDICARE

## 2018-08-14 VITALS
HEIGHT: 63 IN | TEMPERATURE: 98.5 F | RESPIRATION RATE: 16 BRPM | BODY MASS INDEX: 29.41 KG/M2 | SYSTOLIC BLOOD PRESSURE: 138 MMHG | OXYGEN SATURATION: 88 % | WEIGHT: 166 LBS | HEART RATE: 85 BPM | DIASTOLIC BLOOD PRESSURE: 70 MMHG

## 2018-08-14 DIAGNOSIS — F51.01 PRIMARY INSOMNIA: ICD-10-CM

## 2018-08-14 DIAGNOSIS — F41.9 ANXIETY: ICD-10-CM

## 2018-08-14 DIAGNOSIS — R33.9 URINARY RETENTION: ICD-10-CM

## 2018-08-14 DIAGNOSIS — J45.40 ASTHMA, MODERATE PERSISTENT, POORLY-CONTROLLED: ICD-10-CM

## 2018-08-14 DIAGNOSIS — F33.2 SEVERE EPISODE OF RECURRENT MAJOR DEPRESSIVE DISORDER, WITHOUT PSYCHOTIC FEATURES (HCC): ICD-10-CM

## 2018-08-14 PROBLEM — J06.9 VIRAL UPPER RESPIRATORY TRACT INFECTION: Status: RESOLVED | Noted: 2017-04-18 | Resolved: 2018-08-14

## 2018-08-14 PROCEDURE — 99214 OFFICE O/P EST MOD 30 MIN: CPT | Performed by: FAMILY MEDICINE

## 2018-08-14 RX ORDER — LORAZEPAM 0.5 MG/1
.25-.5 TABLET ORAL 2 TIMES DAILY PRN
Qty: 60 TAB | Refills: 2 | Status: SHIPPED
Start: 2018-08-14 | End: 2018-09-13

## 2018-08-14 RX ORDER — SERTRALINE HYDROCHLORIDE 100 MG/1
200 TABLET, FILM COATED ORAL
Qty: 180 TAB | Refills: 3 | Status: SHIPPED | OUTPATIENT
Start: 2018-08-14

## 2018-08-14 NOTE — ASSESSMENT & PLAN NOTE
Was kicked out of her daughters house about 6 weeks ago, for several different arguments.  Has been living with grand daughter. Has been looking at long term living.    Has not got out of the house in the last 1 month.  Cannot cook for herself, does not bath without somebody around.    Mood is decreased. No suicidal plan. She is on zoloft 150 mg daily and taking ativan 0.25 mg 1-2 times daily.

## 2018-08-14 NOTE — ASSESSMENT & PLAN NOTE
Patient has been having difficulty with urinary retention.  He was she was seen at a hospital in [place] and had a CT scan of abdomen and pelvis without significant abnormality, GFR was stable at chronic kidney disease stage III, no evidence of urinary tract infection.  She did require catheterization while in the hospital.  Patient's fluid intake is limited, but she only produces small amounts of dribble when she tries to urinate.  Patient was suffering from constipation, but no longer has constipation issues.  She is requesting intermittent catheterization to help relieve bladder pressure when needed.

## 2018-08-14 NOTE — PROGRESS NOTES
Subjective:   Laney Watt is a 89 y.o. female here today for dperession    Depression  Was kicked out of her daughters house about 6 weeks ago, for several different arguments.  Has been living with grand daughter. Has been looking at long term living.    Has not got out of the house in the last 1 month.  Cannot cook for herself, does not bath without somebody around.    Mood is decreased. No suicidal plan. She is on zoloft 150 mg daily and taking ativan 0.25 mg 1-2 times daily.        Asthma, moderate persistent, poorly-controlled  Uses oxygen at night, 4 L and none during the day.  She takes prednisone 5 mg daily and uses albuterol throughout the day.  She gets short of breath just walking to the kitchen.    Urinary retention  Patient has been having difficulty with urinary retention.  He was she was seen at a hospital in [place] and had a CT scan of abdomen and pelvis without significant abnormality, GFR was stable at chronic kidney disease stage III, no evidence of urinary tract infection.  She did require catheterization while in the hospital.  Patient's fluid intake is limited, but she only produces small amounts of dribble when she tries to urinate.  Patient was suffering from constipation, but no longer has constipation issues.  She is requesting intermittent catheterization to help relieve bladder pressure when needed.         Current medicines (including changes today)  Current Outpatient Prescriptions   Medication Sig Dispense Refill   • sertraline (ZOLOFT) 100 MG Tab Take 2 Tabs by mouth every day. 180 Tab 3   • LORazepam (ATIVAN) 0.5 MG Tab Take 0.5-1 Tabs by mouth 2 times a day as needed for Anxiety for up to 30 days. 60 Tab 2   • predniSONE (DELTASONE) 5 MG Tab Take 1 Tab by mouth every morning with breakfast. 90 Tab 3   • lisinopril (PRINIVIL, ZESTRIL) 40 MG tablet Take 1.5 Tabs by mouth every morning. 135 Tab 0   • amlodipine (NORVASC) 5 MG Tab Take 1 Tab by mouth 2 Times a Day. 180 Tab 3   •  "ondansetron (ZOFRAN) 4 MG Tab tablet Take 1 Tab by mouth every four hours as needed for Nausea/Vomiting. 20 Tab 2   • Cholecalciferol (VITAMIN D) 2000 UNIT CAPS Take 1 Cap by mouth every day.     • albuterol (PROAIR HFA) 108 (90 BASE) MCG/ACT AERS Inhale 2 Puffs by mouth every 6 hours as needed. 2 Inhaler 4     No current facility-administered medications for this visit.      She  has a past medical history of Arthritis; ASTHMA; Depression; Hypertension; Hypoxia; and Insomnia.    ROS   No chest pain, + shortness of breath       Objective:     Blood pressure 138/70, pulse 85, temperature 36.9 °C (98.5 °F), resp. rate 16, height 1.6 m (5' 2.99\"), weight 75.3 kg (166 lb), SpO2 88 %. Body mass index is 29.41 kg/m².   Physical Exam:  Constitutional: Alert, no distress.  Skin: Warm, dry, good turgor, no rashes in visible areas.  Eye: Equal, round and reactive, conjunctiva clear, lids normal.  Psych: Alert and oriented x3, flat affect and mood.        Assessment and Plan:   The following treatment plan was discussed    1. Severe episode of recurrent major depressive disorder, without psychotic features (HCC)  Uncontrolled.  Will increase sertraline 150 mg daily to 200 mg daily.  Follow-up in 4 weeks  - sertraline (ZOLOFT) 100 MG Tab; Take 2 Tabs by mouth every day.  Dispense: 180 Tab; Refill: 3  - REFERRAL TO COMPLEX CARE MANAGEMENT Services Requested: Care Manager to Evaluate and Recommend,     2. Anxiety  Stable.  Refill Lorazepam for now.  Advised patient to minimize use and not mix the medication with alcohol.  - LORazepam (ATIVAN) 0.5 MG Tab; Take 0.5-1 Tabs by mouth 2 times a day as needed for Anxiety for up to 30 days.  Dispense: 60 Tab; Refill: 2    3. Primary insomnia  Stable.  Refill Lorazepam for now.  Advised patient to minimize use and not mix the medication with alcohol.  - LORazepam (ATIVAN) 0.5 MG Tab; Take 0.5-1 Tabs by mouth 2 times a day as needed for Anxiety for up to 30 days.  Dispense: 60 " Tab; Refill: 2    4. Asthma, moderate persistent, poorly-controlled  Stable.  Will continue prednisone 5 mg daily for now.  - REFERRAL TO COMPLEX CARE MANAGEMENT Services Requested: Care Manager to Evaluate and Recommend,     5. Urinary retention  We will give a prescription for straight caths.  Referral to home health for straight cath training.  - REFERRAL TO HOME HEALTH      Followup: Return in about 4 weeks (around 9/11/2018) for Depression.

## 2018-08-14 NOTE — ASSESSMENT & PLAN NOTE
Uses oxygen at night, 4 L and none during the day.  She takes prednisone 5 mg daily and uses albuterol throughout the day.  She gets short of breath just walking to the kitchen.

## 2018-08-16 ENCOUNTER — HOME CARE VISIT (OUTPATIENT)
Dept: HOME HEALTH SERVICES | Facility: HOME HEALTHCARE | Age: 83
End: 2018-08-16
Payer: MEDICARE

## 2018-08-16 ENCOUNTER — TELEPHONE (OUTPATIENT)
Dept: MEDICAL GROUP | Facility: MEDICAL CENTER | Age: 83
End: 2018-08-16

## 2018-08-16 ENCOUNTER — TELEPHONE (OUTPATIENT)
Dept: HEALTH INFORMATION MANAGEMENT | Facility: OTHER | Age: 83
End: 2018-08-16

## 2018-08-16 VITALS
OXYGEN SATURATION: 94 % | RESPIRATION RATE: 16 BRPM | TEMPERATURE: 97.1 F | HEART RATE: 72 BPM | SYSTOLIC BLOOD PRESSURE: 150 MMHG | HEIGHT: 62 IN | DIASTOLIC BLOOD PRESSURE: 64 MMHG | WEIGHT: 133 LBS | BODY MASS INDEX: 24.48 KG/M2

## 2018-08-16 DIAGNOSIS — R33.9 URINARY RETENTION: ICD-10-CM

## 2018-08-16 PROCEDURE — 665998 HH PPS REVENUE CREDIT

## 2018-08-16 PROCEDURE — G0493 RN CARE EA 15 MIN HH/HOSPICE: HCPCS

## 2018-08-16 PROCEDURE — 665001 SOC-HOME HEALTH

## 2018-08-16 PROCEDURE — 665999 HH PPS REVENUE DEBIT

## 2018-08-16 SDOH — ECONOMIC STABILITY: HOUSING INSECURITY: UNSAFE APPLIANCES: 0

## 2018-08-16 SDOH — ECONOMIC STABILITY: HOUSING INSECURITY
HOME SAFETY: OXYGEN SAFETY RISK ASSESSMENT PERFORMED. PATIENT PT WAS GIVEN A NO SMOKING SIGN AND PROVIDED EDUCATION ABOUT WHY IT IS IMPORTANT TO PLACE ONE. PATIENT DOES HAVE A WORKING FIRE EXTINGUISHER PRESENT IN THE HOME. SMOKE ALARMS ARE PRESENT AND FUNCTIONAL

## 2018-08-16 SDOH — ECONOMIC STABILITY: HOUSING INSECURITY
HOME SAFETY: ON EACH LEVEL OF THE HOME. PATIENT DOES HAVE A FIRE ESCAPE PLAN DEVELOPED. PATIENT DOES NOT HAVE FLAMMABLE MATERIALS PRESENT IN THE HOME PRESENTING A FIRE HAZARD. NO EVIDENCE FOUND OF SMOKING MATERIALS PRESENT IN THE HOME.

## 2018-08-16 SDOH — ECONOMIC STABILITY: HOUSING INSECURITY: UNSAFE COOKING RANGE AREA: 0

## 2018-08-16 ASSESSMENT — ENCOUNTER SYMPTOMS
NAUSEA: DENIES
DEPRESSED MOOD: 1
VOMITING: DENIES
SHORTNESS OF BREATH: T

## 2018-08-16 ASSESSMENT — PATIENT HEALTH QUESTIONNAIRE - PHQ9
1. LITTLE INTEREST OR PLEASURE IN DOING THINGS: 03
2. FEELING DOWN, DEPRESSED, IRRITABLE, OR HOPELESS: 03

## 2018-08-16 ASSESSMENT — ACTIVITIES OF DAILY LIVING (ADL)
HOME_HEALTH_OASIS: 01
OASIS_M1830: 03

## 2018-08-16 NOTE — TELEPHONE ENCOUNTER
Name: Lucy [facility] Nurse                    Phone: [phone]    Nurse is requesting order for an Indwelling catheter and referral to Urologist, as the patient is not able to functionally perform self-catheterization.

## 2018-08-16 NOTE — TELEPHONE ENCOUNTER
Please complete new order to [facility] for indwelling albarran catheter. I will review and sign.  Referral to urology done.  Roberto Cota M.D.

## 2018-08-16 NOTE — TELEPHONE ENCOUNTER
Received referral from [facility]. Medications reviewed. No clinically significant interactions or medication issues noted.     Yanely Mchugh, RabiaD

## 2018-08-17 ENCOUNTER — HOME CARE VISIT (OUTPATIENT)
Dept: HOME HEALTH SERVICES | Facility: HOME HEALTHCARE | Age: 83
End: 2018-08-17
Payer: MEDICARE

## 2018-08-17 ENCOUNTER — PATIENT OUTREACH (OUTPATIENT)
Dept: HEALTH INFORMATION MANAGEMENT | Facility: OTHER | Age: 83
End: 2018-08-17

## 2018-08-17 VITALS
TEMPERATURE: 98.7 F | OXYGEN SATURATION: 94 % | DIASTOLIC BLOOD PRESSURE: 52 MMHG | HEART RATE: 68 BPM | SYSTOLIC BLOOD PRESSURE: 142 MMHG | RESPIRATION RATE: 16 BRPM

## 2018-08-17 PROCEDURE — G0299 HHS/HOSPICE OF RN EA 15 MIN: HCPCS

## 2018-08-17 PROCEDURE — 665999 HH PPS REVENUE DEBIT

## 2018-08-17 PROCEDURE — 665998 HH PPS REVENUE CREDIT

## 2018-08-17 ASSESSMENT — ENCOUNTER SYMPTOMS
RESPIRATORY SYMPTOMS COMMENTS: NO CONCERNS AT THE TIME OF THIS ASSESSMENT.
NAUSEA: DENIES
VOMITING: DENIES
SHORTNESS OF BREATH: T

## 2018-08-17 NOTE — TELEPHONE ENCOUNTER
I signed an order yesterday for albarran catheter. Please contact home health nurse to see what corrections they recommend and how we should get a prescription to them.  Roberto Cota M.D.

## 2018-08-17 NOTE — PROGRESS NOTES
Pt referred to  care coordination with report of needing housing options. Per chart review/PCP note on 08/14/18 pt was kicked out of her daughter's home and is now living with her grandchild but is interested in placement. Pt has initiated care with [facility] and per chart review MSW has been ordered. This MSW outreach to Home Health MSW notifying of report from PCP and that this LSW will close out referral to social work care coordination at this time due to Home Health being in placed. Requested referral be sent over to care coordination in the future if there are ongoing social needs upon Home Health care discharge from services.     Plan:  · At this time social work care coordination referral will be closed due to pt being open to [facility] and MSW ordered through agency to address current social needs.

## 2018-08-17 NOTE — TELEPHONE ENCOUNTER
Patient refused indewelling albarran catheter again today.    Home Health would like order faxed to them directly, states they need it to specify that they are to place catheter with size and type of catheter.

## 2018-08-17 NOTE — TELEPHONE ENCOUNTER
VOICEMAIL  1. Caller Name: Nina (Home Nurse)                      Call Back Number: [phone]    2. Message: Home Nurse states that order to [facility] for indweling albarran catheter was incorrect. She is reuqesting order be sent to home health. Please specify size and type of catheter

## 2018-08-18 PROCEDURE — 665999 HH PPS REVENUE DEBIT

## 2018-08-18 PROCEDURE — 665998 HH PPS REVENUE CREDIT

## 2018-08-19 PROCEDURE — 665998 HH PPS REVENUE CREDIT

## 2018-08-19 PROCEDURE — 665999 HH PPS REVENUE DEBIT

## 2018-08-20 ENCOUNTER — HOME CARE VISIT (OUTPATIENT)
Dept: HOME HEALTH SERVICES | Facility: HOME HEALTHCARE | Age: 83
End: 2018-08-20
Payer: MEDICARE

## 2018-08-20 PROCEDURE — 665999 HH PPS REVENUE DEBIT

## 2018-08-20 PROCEDURE — 665998 HH PPS REVENUE CREDIT

## 2018-08-20 PROCEDURE — G0155 HHCP-SVS OF CSW,EA 15 MIN: HCPCS

## 2018-08-20 ASSESSMENT — SOCIAL DETERMINANTS OF HEALTH (SDOH): IS CONCERNED ABOUT INCOME: N

## 2018-08-21 ENCOUNTER — HOME CARE VISIT (OUTPATIENT)
Dept: HOME HEALTH SERVICES | Facility: HOME HEALTHCARE | Age: 83
End: 2018-08-21
Payer: MEDICARE

## 2018-08-21 VITALS
HEART RATE: 68 BPM | RESPIRATION RATE: 16 BRPM | OXYGEN SATURATION: 94 % | SYSTOLIC BLOOD PRESSURE: 138 MMHG | DIASTOLIC BLOOD PRESSURE: 64 MMHG | TEMPERATURE: 97.3 F

## 2018-08-21 PROCEDURE — 665998 HH PPS REVENUE CREDIT

## 2018-08-21 PROCEDURE — G0495 RN CARE TRAIN/EDU IN HH: HCPCS

## 2018-08-21 PROCEDURE — 665999 HH PPS REVENUE DEBIT

## 2018-08-21 ASSESSMENT — ENCOUNTER SYMPTOMS
NAUSEA: DENIES
RESPIRATORY SYMPTOMS COMMENTS: NO CONCERNS AT THE TIME OF THIS ASSESSMENT.
VOMITING: DENIES
SHORTNESS OF BREATH: T

## 2018-08-22 PROCEDURE — 665999 HH PPS REVENUE DEBIT

## 2018-08-22 PROCEDURE — 665998 HH PPS REVENUE CREDIT

## 2018-08-23 ENCOUNTER — HOME CARE VISIT (OUTPATIENT)
Dept: HOME HEALTH SERVICES | Facility: HOME HEALTHCARE | Age: 83
End: 2018-08-23
Payer: MEDICARE

## 2018-08-23 PROCEDURE — 665998 HH PPS REVENUE CREDIT

## 2018-08-23 PROCEDURE — 665999 HH PPS REVENUE DEBIT

## 2018-08-24 DIAGNOSIS — I10 ESSENTIAL HYPERTENSION: ICD-10-CM

## 2018-08-24 PROCEDURE — 665998 HH PPS REVENUE CREDIT

## 2018-08-24 PROCEDURE — 665999 HH PPS REVENUE DEBIT

## 2018-08-24 RX ORDER — LISINOPRIL 40 MG/1
60 TABLET ORAL EVERY MORNING
Qty: 135 TAB | Refills: 3 | Status: SHIPPED | OUTPATIENT
Start: 2018-08-24 | End: 2018-09-12 | Stop reason: SDUPTHER

## 2018-08-25 PROCEDURE — 665998 HH PPS REVENUE CREDIT

## 2018-08-25 PROCEDURE — 665999 HH PPS REVENUE DEBIT

## 2018-08-26 PROCEDURE — 665998 HH PPS REVENUE CREDIT

## 2018-08-26 PROCEDURE — 665999 HH PPS REVENUE DEBIT

## 2018-08-27 PROCEDURE — 665998 HH PPS REVENUE CREDIT

## 2018-08-27 PROCEDURE — G0180 MD CERTIFICATION HHA PATIENT: HCPCS | Performed by: FAMILY MEDICINE

## 2018-08-27 PROCEDURE — 665999 HH PPS REVENUE DEBIT

## 2018-08-28 ENCOUNTER — HOME CARE VISIT (OUTPATIENT)
Dept: HOME HEALTH SERVICES | Facility: HOME HEALTHCARE | Age: 83
End: 2018-08-28
Payer: MEDICARE

## 2018-08-28 PROCEDURE — 665999 HH PPS REVENUE DEBIT

## 2018-08-28 PROCEDURE — 665998 HH PPS REVENUE CREDIT

## 2018-08-29 PROCEDURE — 665999 HH PPS REVENUE DEBIT

## 2018-08-29 PROCEDURE — 665998 HH PPS REVENUE CREDIT

## 2018-08-30 ENCOUNTER — HOME CARE VISIT (OUTPATIENT)
Dept: HOME HEALTH SERVICES | Facility: HOME HEALTHCARE | Age: 83
End: 2018-08-30
Payer: MEDICARE

## 2018-08-30 PROCEDURE — 665998 HH PPS REVENUE CREDIT

## 2018-08-30 PROCEDURE — 665999 HH PPS REVENUE DEBIT

## 2018-08-31 PROCEDURE — 665999 HH PPS REVENUE DEBIT

## 2018-08-31 PROCEDURE — 665998 HH PPS REVENUE CREDIT

## 2018-09-01 PROCEDURE — 665999 HH PPS REVENUE DEBIT

## 2018-09-01 PROCEDURE — 665998 HH PPS REVENUE CREDIT

## 2018-09-02 PROCEDURE — 665999 HH PPS REVENUE DEBIT

## 2018-09-02 PROCEDURE — 665998 HH PPS REVENUE CREDIT

## 2018-09-03 PROCEDURE — 665998 HH PPS REVENUE CREDIT

## 2018-09-03 PROCEDURE — 665999 HH PPS REVENUE DEBIT

## 2018-09-04 ENCOUNTER — HOME CARE VISIT (OUTPATIENT)
Dept: HOME HEALTH SERVICES | Facility: HOME HEALTHCARE | Age: 83
End: 2018-09-04
Payer: MEDICARE

## 2018-09-04 VITALS
SYSTOLIC BLOOD PRESSURE: 148 MMHG | RESPIRATION RATE: 18 BRPM | TEMPERATURE: 97.5 F | OXYGEN SATURATION: 94 % | HEART RATE: 77 BPM | DIASTOLIC BLOOD PRESSURE: 72 MMHG

## 2018-09-04 PROCEDURE — 665999 HH PPS REVENUE DEBIT

## 2018-09-04 PROCEDURE — 665998 HH PPS REVENUE CREDIT

## 2018-09-04 PROCEDURE — G0495 RN CARE TRAIN/EDU IN HH: HCPCS

## 2018-09-04 ASSESSMENT — ENCOUNTER SYMPTOMS
VOMITING: DENIES
NAUSEA: DENIES
DEPRESSED MOOD: 1
SHORTNESS OF BREATH: T
SEVERE DYSPNEA: 1

## 2018-09-05 ENCOUNTER — HOME CARE VISIT (OUTPATIENT)
Dept: HOME HEALTH SERVICES | Facility: HOME HEALTHCARE | Age: 83
End: 2018-09-05
Payer: MEDICARE

## 2018-09-05 PROCEDURE — 665999 HH PPS REVENUE DEBIT

## 2018-09-05 PROCEDURE — 665998 HH PPS REVENUE CREDIT

## 2018-09-06 ENCOUNTER — HOME CARE VISIT (OUTPATIENT)
Dept: HOME HEALTH SERVICES | Facility: HOME HEALTHCARE | Age: 83
End: 2018-09-06
Payer: MEDICARE

## 2018-09-06 VITALS
DIASTOLIC BLOOD PRESSURE: 60 MMHG | OXYGEN SATURATION: 98 % | SYSTOLIC BLOOD PRESSURE: 140 MMHG | TEMPERATURE: 98.3 F | RESPIRATION RATE: 18 BRPM | HEART RATE: 78 BPM

## 2018-09-06 PROCEDURE — 665999 HH PPS REVENUE DEBIT

## 2018-09-06 PROCEDURE — 665998 HH PPS REVENUE CREDIT

## 2018-09-06 PROCEDURE — G0151 HHCP-SERV OF PT,EA 15 MIN: HCPCS

## 2018-09-07 ENCOUNTER — HOME CARE VISIT (OUTPATIENT)
Dept: HOME HEALTH SERVICES | Facility: HOME HEALTHCARE | Age: 83
End: 2018-09-07
Payer: MEDICARE

## 2018-09-07 PROCEDURE — 665998 HH PPS REVENUE CREDIT

## 2018-09-07 PROCEDURE — 665999 HH PPS REVENUE DEBIT

## 2018-09-08 PROCEDURE — 665999 HH PPS REVENUE DEBIT

## 2018-09-08 PROCEDURE — 665998 HH PPS REVENUE CREDIT

## 2018-09-09 PROCEDURE — 665999 HH PPS REVENUE DEBIT

## 2018-09-09 PROCEDURE — 665998 HH PPS REVENUE CREDIT

## 2018-09-10 ENCOUNTER — TELEPHONE (OUTPATIENT)
Dept: MEDICAL GROUP | Facility: MEDICAL CENTER | Age: 83
End: 2018-09-10

## 2018-09-10 ENCOUNTER — HOME CARE VISIT (OUTPATIENT)
Dept: HOME HEALTH SERVICES | Facility: HOME HEALTHCARE | Age: 83
End: 2018-09-10
Payer: MEDICARE

## 2018-09-10 VITALS
HEART RATE: 82 BPM | TEMPERATURE: 97.2 F | DIASTOLIC BLOOD PRESSURE: 74 MMHG | SYSTOLIC BLOOD PRESSURE: 150 MMHG | RESPIRATION RATE: 18 BRPM | OXYGEN SATURATION: 94 %

## 2018-09-10 PROCEDURE — 665999 HH PPS REVENUE DEBIT

## 2018-09-10 PROCEDURE — G0495 RN CARE TRAIN/EDU IN HH: HCPCS

## 2018-09-10 PROCEDURE — 665998 HH PPS REVENUE CREDIT

## 2018-09-10 SDOH — ECONOMIC STABILITY: HOUSING INSECURITY: UNSAFE COOKING RANGE AREA: 0

## 2018-09-10 SDOH — ECONOMIC STABILITY: HOUSING INSECURITY: UNSAFE APPLIANCES: 0

## 2018-09-10 SDOH — ECONOMIC STABILITY: HOUSING INSECURITY: HOME SAFETY: PT HAS A HIGH BED

## 2018-09-10 ASSESSMENT — ENCOUNTER SYMPTOMS: RESPIRATORY SYMPTOMS COMMENTS: PT ON CONTINUOUS OXYGEN

## 2018-09-10 NOTE — TELEPHONE ENCOUNTER
Patient should go to the urgent care or emergency department to have x-ray and evaluation.  Roberto Cota M.D.

## 2018-09-10 NOTE — TELEPHONE ENCOUNTER
"[facility] reports that patient experienced a fall in the bathroom. She reports pain to her left, lower back and continued incontinence. /74 and other vitals \"stable.\"  "

## 2018-09-11 PROCEDURE — 665998 HH PPS REVENUE CREDIT

## 2018-09-11 PROCEDURE — 665999 HH PPS REVENUE DEBIT

## 2018-09-12 ENCOUNTER — HOME CARE VISIT (OUTPATIENT)
Dept: HOME HEALTH SERVICES | Facility: HOME HEALTHCARE | Age: 83
End: 2018-09-12
Payer: MEDICARE

## 2018-09-12 VITALS
OXYGEN SATURATION: 98 % | TEMPERATURE: 98.7 F | HEART RATE: 73 BPM | SYSTOLIC BLOOD PRESSURE: 143 MMHG | DIASTOLIC BLOOD PRESSURE: 60 MMHG | RESPIRATION RATE: 18 BRPM

## 2018-09-12 DIAGNOSIS — F51.01 PRIMARY INSOMNIA: ICD-10-CM

## 2018-09-12 DIAGNOSIS — F41.9 ANXIETY: ICD-10-CM

## 2018-09-12 DIAGNOSIS — I10 ESSENTIAL HYPERTENSION: ICD-10-CM

## 2018-09-12 PROCEDURE — G0151 HHCP-SERV OF PT,EA 15 MIN: HCPCS

## 2018-09-12 PROCEDURE — 665998 HH PPS REVENUE CREDIT

## 2018-09-12 PROCEDURE — 665999 HH PPS REVENUE DEBIT

## 2018-09-12 RX ORDER — LISINOPRIL 40 MG/1
60 TABLET ORAL EVERY MORNING
Qty: 135 TAB | Refills: 3 | Status: ON HOLD | OUTPATIENT
Start: 2018-09-12 | End: 2018-11-21

## 2018-09-13 PROCEDURE — 665999 HH PPS REVENUE DEBIT

## 2018-09-13 PROCEDURE — 665998 HH PPS REVENUE CREDIT

## 2018-09-14 ENCOUNTER — HOME CARE VISIT (OUTPATIENT)
Dept: HOME HEALTH SERVICES | Facility: HOME HEALTHCARE | Age: 83
End: 2018-09-14
Payer: MEDICARE

## 2018-09-14 VITALS
OXYGEN SATURATION: 97 % | DIASTOLIC BLOOD PRESSURE: 63 MMHG | SYSTOLIC BLOOD PRESSURE: 159 MMHG | TEMPERATURE: 98.2 F | RESPIRATION RATE: 18 BRPM | HEART RATE: 71 BPM

## 2018-09-14 PROCEDURE — G0151 HHCP-SERV OF PT,EA 15 MIN: HCPCS

## 2018-09-14 PROCEDURE — 665999 HH PPS REVENUE DEBIT

## 2018-09-14 PROCEDURE — 665998 HH PPS REVENUE CREDIT

## 2018-09-15 PROCEDURE — 665999 HH PPS REVENUE DEBIT

## 2018-09-15 PROCEDURE — 665998 HH PPS REVENUE CREDIT

## 2018-09-16 PROCEDURE — 665999 HH PPS REVENUE DEBIT

## 2018-09-16 PROCEDURE — 665998 HH PPS REVENUE CREDIT

## 2018-09-17 ENCOUNTER — HOME CARE VISIT (OUTPATIENT)
Dept: HOME HEALTH SERVICES | Facility: HOME HEALTHCARE | Age: 83
End: 2018-09-17
Payer: MEDICARE

## 2018-09-17 VITALS
RESPIRATION RATE: 18 BRPM | SYSTOLIC BLOOD PRESSURE: 158 MMHG | HEART RATE: 70 BPM | TEMPERATURE: 98.6 F | OXYGEN SATURATION: 98 % | DIASTOLIC BLOOD PRESSURE: 68 MMHG

## 2018-09-17 PROCEDURE — 665999 HH PPS REVENUE DEBIT

## 2018-09-17 PROCEDURE — 665998 HH PPS REVENUE CREDIT

## 2018-09-17 PROCEDURE — G0151 HHCP-SERV OF PT,EA 15 MIN: HCPCS

## 2018-09-18 PROCEDURE — 665999 HH PPS REVENUE DEBIT

## 2018-09-18 PROCEDURE — 665998 HH PPS REVENUE CREDIT

## 2018-09-19 ENCOUNTER — HOME CARE VISIT (OUTPATIENT)
Dept: HOME HEALTH SERVICES | Facility: HOME HEALTHCARE | Age: 83
End: 2018-09-19
Payer: MEDICARE

## 2018-09-19 VITALS
HEART RATE: 74 BPM | OXYGEN SATURATION: 98 % | RESPIRATION RATE: 18 BRPM | DIASTOLIC BLOOD PRESSURE: 62 MMHG | SYSTOLIC BLOOD PRESSURE: 148 MMHG | TEMPERATURE: 96.9 F

## 2018-09-19 VITALS
HEART RATE: 74 BPM | OXYGEN SATURATION: 98 % | SYSTOLIC BLOOD PRESSURE: 148 MMHG | RESPIRATION RATE: 18 BRPM | TEMPERATURE: 96.9 F | DIASTOLIC BLOOD PRESSURE: 62 MMHG

## 2018-09-19 PROCEDURE — 665999 HH PPS REVENUE DEBIT

## 2018-09-19 PROCEDURE — G0495 RN CARE TRAIN/EDU IN HH: HCPCS

## 2018-09-19 PROCEDURE — 665998 HH PPS REVENUE CREDIT

## 2018-09-19 PROCEDURE — G0151 HHCP-SERV OF PT,EA 15 MIN: HCPCS

## 2018-09-19 ASSESSMENT — ENCOUNTER SYMPTOMS
NAUSEA: DENIES
DEPRESSED MOOD: 1
RESPIRATORY SYMPTOMS COMMENTS: NO CONCERNS AT THE TIME OF THIS ASSESSMENT.
VOMITING: DENIES

## 2018-09-20 ENCOUNTER — HOSPICE ADMISSION (OUTPATIENT)
Dept: HOSPICE | Facility: HOSPICE | Age: 83
End: 2018-09-20
Payer: MEDICARE

## 2018-09-20 ENCOUNTER — HOME CARE VISIT (OUTPATIENT)
Dept: HOSPICE | Facility: HOSPICE | Age: 83
End: 2018-09-20
Payer: MEDICARE

## 2018-09-20 PROCEDURE — 665999 HH PPS REVENUE DEBIT

## 2018-09-20 PROCEDURE — 665998 HH PPS REVENUE CREDIT

## 2018-09-21 ENCOUNTER — HOME CARE VISIT (OUTPATIENT)
Dept: HOSPICE | Facility: HOSPICE | Age: 83
End: 2018-09-21
Payer: MEDICARE

## 2018-09-21 PROCEDURE — 665999 HH PPS REVENUE DEBIT

## 2018-09-21 PROCEDURE — 665998 HH PPS REVENUE CREDIT

## 2018-09-22 PROCEDURE — 665999 HH PPS REVENUE DEBIT

## 2018-09-22 PROCEDURE — 665998 HH PPS REVENUE CREDIT

## 2018-09-23 PROCEDURE — 665999 HH PPS REVENUE DEBIT

## 2018-09-23 PROCEDURE — 665998 HH PPS REVENUE CREDIT

## 2018-09-24 ENCOUNTER — HOME CARE VISIT (OUTPATIENT)
Dept: HOME HEALTH SERVICES | Facility: HOME HEALTHCARE | Age: 83
End: 2018-09-24
Payer: MEDICARE

## 2018-09-24 VITALS
SYSTOLIC BLOOD PRESSURE: 142 MMHG | RESPIRATION RATE: 18 BRPM | OXYGEN SATURATION: 98 % | DIASTOLIC BLOOD PRESSURE: 66 MMHG | TEMPERATURE: 98.2 F | HEART RATE: 70 BPM

## 2018-09-24 PROCEDURE — G0495 RN CARE TRAIN/EDU IN HH: HCPCS

## 2018-09-24 PROCEDURE — 665998 HH PPS REVENUE CREDIT

## 2018-09-24 PROCEDURE — 665999 HH PPS REVENUE DEBIT

## 2018-09-24 SDOH — ECONOMIC STABILITY: HOUSING INSECURITY: UNSAFE APPLIANCES: 0

## 2018-09-24 SDOH — ECONOMIC STABILITY: HOUSING INSECURITY: UNSAFE COOKING RANGE AREA: 0

## 2018-09-25 ENCOUNTER — HOME CARE VISIT (OUTPATIENT)
Dept: HOME HEALTH SERVICES | Facility: HOME HEALTHCARE | Age: 83
End: 2018-09-25
Payer: MEDICARE

## 2018-09-25 VITALS
HEART RATE: 68 BPM | DIASTOLIC BLOOD PRESSURE: 63 MMHG | TEMPERATURE: 98.2 F | OXYGEN SATURATION: 95 % | RESPIRATION RATE: 18 BRPM | SYSTOLIC BLOOD PRESSURE: 148 MMHG

## 2018-09-25 PROCEDURE — G0151 HHCP-SERV OF PT,EA 15 MIN: HCPCS

## 2018-09-25 PROCEDURE — 665998 HH PPS REVENUE CREDIT

## 2018-09-25 PROCEDURE — 665999 HH PPS REVENUE DEBIT

## 2018-09-26 PROCEDURE — 665998 HH PPS REVENUE CREDIT

## 2018-09-26 PROCEDURE — 665999 HH PPS REVENUE DEBIT

## 2018-09-27 ENCOUNTER — HOME CARE VISIT (OUTPATIENT)
Dept: HOME HEALTH SERVICES | Facility: HOME HEALTHCARE | Age: 83
End: 2018-09-27
Payer: MEDICARE

## 2018-09-27 VITALS
TEMPERATURE: 98 F | SYSTOLIC BLOOD PRESSURE: 132 MMHG | DIASTOLIC BLOOD PRESSURE: 58 MMHG | RESPIRATION RATE: 18 BRPM | OXYGEN SATURATION: 98 % | HEART RATE: 65 BPM

## 2018-09-27 PROCEDURE — 665998 HH PPS REVENUE CREDIT

## 2018-09-27 PROCEDURE — 665999 HH PPS REVENUE DEBIT

## 2018-09-27 PROCEDURE — G0151 HHCP-SERV OF PT,EA 15 MIN: HCPCS

## 2018-09-28 PROCEDURE — 665999 HH PPS REVENUE DEBIT

## 2018-09-28 PROCEDURE — 665998 HH PPS REVENUE CREDIT

## 2018-09-29 PROCEDURE — 665998 HH PPS REVENUE CREDIT

## 2018-09-29 PROCEDURE — 665999 HH PPS REVENUE DEBIT

## 2018-09-30 PROCEDURE — 665999 HH PPS REVENUE DEBIT

## 2018-09-30 PROCEDURE — 665998 HH PPS REVENUE CREDIT

## 2018-10-01 PROCEDURE — 665998 HH PPS REVENUE CREDIT

## 2018-10-01 PROCEDURE — 665999 HH PPS REVENUE DEBIT

## 2018-10-02 PROCEDURE — 665998 HH PPS REVENUE CREDIT

## 2018-10-02 PROCEDURE — 665999 HH PPS REVENUE DEBIT

## 2018-10-03 ENCOUNTER — TELEPHONE (OUTPATIENT)
Dept: MEDICAL GROUP | Facility: MEDICAL CENTER | Age: 83
End: 2018-10-03

## 2018-10-03 ENCOUNTER — HOME CARE VISIT (OUTPATIENT)
Dept: HOME HEALTH SERVICES | Facility: HOME HEALTHCARE | Age: 83
End: 2018-10-03
Payer: MEDICARE

## 2018-10-03 VITALS
HEART RATE: 68 BPM | TEMPERATURE: 96.8 F | DIASTOLIC BLOOD PRESSURE: 58 MMHG | SYSTOLIC BLOOD PRESSURE: 142 MMHG | RESPIRATION RATE: 1 BRPM | OXYGEN SATURATION: 93 %

## 2018-10-03 PROCEDURE — 665997 HH PPS REVENUE ADJ

## 2018-10-03 PROCEDURE — 665998 HH PPS REVENUE CREDIT

## 2018-10-03 PROCEDURE — G0495 RN CARE TRAIN/EDU IN HH: HCPCS

## 2018-10-03 PROCEDURE — 665999 HH PPS REVENUE DEBIT

## 2018-10-03 RX ORDER — BUPROPION HYDROCHLORIDE 100 MG/1
100 TABLET, EXTENDED RELEASE ORAL 2 TIMES DAILY
Qty: 60 TAB | Refills: 2 | Status: SHIPPED | OUTPATIENT
Start: 2018-10-03 | End: 2018-11-18

## 2018-10-03 ASSESSMENT — ENCOUNTER SYMPTOMS
NAUSEA: DENIES
VOMITING: DENIES
SHORTNESS OF BREATH: T
RESPIRATORY SYMPTOMS COMMENTS: NO CONCERNS AT THE TIME OF THIS ASSESSMENT.
DEPRESSED MOOD: 1

## 2018-10-03 NOTE — TELEPHONE ENCOUNTER
Patient is complaining of uncontrolled depression without suicidal plan to home health nurse.    We will add Wellbutrin to Zoloft to see if this helps with her depression.  Roberto Cota M.D.

## 2018-10-04 ENCOUNTER — HOME CARE VISIT (OUTPATIENT)
Dept: HOSPICE | Facility: HOSPICE | Age: 83
End: 2018-10-04
Payer: MEDICARE

## 2018-10-04 PROCEDURE — 665999 HH PPS REVENUE DEBIT

## 2018-10-04 PROCEDURE — 665998 HH PPS REVENUE CREDIT

## 2018-10-05 PROCEDURE — 665999 HH PPS REVENUE DEBIT

## 2018-10-05 PROCEDURE — 665998 HH PPS REVENUE CREDIT

## 2018-10-06 PROCEDURE — 665998 HH PPS REVENUE CREDIT

## 2018-10-06 PROCEDURE — 665999 HH PPS REVENUE DEBIT

## 2018-10-07 PROCEDURE — 665998 HH PPS REVENUE CREDIT

## 2018-10-07 PROCEDURE — 665999 HH PPS REVENUE DEBIT

## 2018-10-08 ENCOUNTER — HOME CARE VISIT (OUTPATIENT)
Dept: HOSPICE | Facility: HOSPICE | Age: 83
End: 2018-10-08
Payer: MEDICARE

## 2018-10-08 PROCEDURE — 665999 HH PPS REVENUE DEBIT

## 2018-10-08 PROCEDURE — 665998 HH PPS REVENUE CREDIT

## 2018-10-09 PROCEDURE — 665999 HH PPS REVENUE DEBIT

## 2018-10-09 PROCEDURE — 665998 HH PPS REVENUE CREDIT

## 2018-10-10 PROCEDURE — 665998 HH PPS REVENUE CREDIT

## 2018-10-10 PROCEDURE — 665999 HH PPS REVENUE DEBIT

## 2018-10-11 PROCEDURE — 665999 HH PPS REVENUE DEBIT

## 2018-10-11 PROCEDURE — 665998 HH PPS REVENUE CREDIT

## 2018-10-12 ENCOUNTER — HOME CARE VISIT (OUTPATIENT)
Dept: HOME HEALTH SERVICES | Facility: HOME HEALTHCARE | Age: 83
End: 2018-10-12
Payer: MEDICARE

## 2018-10-12 PROCEDURE — 665999 HH PPS REVENUE DEBIT

## 2018-10-12 PROCEDURE — 665998 HH PPS REVENUE CREDIT

## 2018-10-12 SDOH — ECONOMIC STABILITY: HOUSING INSECURITY
HOME SAFETY: SMOKE ALARMS ARE PRESENT AND FUNCTIONAL ON EACH LEVEL OF THE HOME. PATIENT DOES HAVE A FIRE ESCAPE PLAN DEVELOPED. PATIENT DOES NOT HAVE FLAMMABLE MATERIALS PRESENT IN THE HOME PRESENTING A FIRE HAZARD. NO EVIDENCE FOUND OF SMOKING MATERIALS PRESENT

## 2018-10-12 SDOH — ECONOMIC STABILITY: HOUSING INSECURITY: HOME SAFETY: IN THE HOME.

## 2018-10-12 SDOH — ECONOMIC STABILITY: HOUSING INSECURITY: UNSAFE COOKING RANGE AREA: 0

## 2018-10-12 SDOH — ECONOMIC STABILITY: HOUSING INSECURITY
HOME SAFETY: PATIENT LIVES IN A SINGLE LEVEL HOME WITH HER SON. PATIENT IS ON SUPPLEMENTAL OXYGEN. OXYGEN SAFETY RISK ASSESSMENT PERFORMED. PATIENT DOES HAVE A NO SMOKING SIGN POSTED IN THE HOME. PATIENT DOES HAVE A WORKING FIRE EXTINGUISHER PRESENT IN THE HOME.

## 2018-10-12 SDOH — ECONOMIC STABILITY: HOUSING INSECURITY: UNSAFE APPLIANCES: 0

## 2018-10-12 ASSESSMENT — ACTIVITIES OF DAILY LIVING (ADL)
HOME_HEALTH_OASIS: 01
OASIS_M1830: 02

## 2018-10-13 PROCEDURE — 665999 HH PPS REVENUE DEBIT

## 2018-10-13 PROCEDURE — 665998 HH PPS REVENUE CREDIT

## 2018-10-14 PROCEDURE — 665998 HH PPS REVENUE CREDIT

## 2018-10-14 PROCEDURE — 665999 HH PPS REVENUE DEBIT

## 2018-10-14 PROCEDURE — 665997 HH PPS REVENUE ADJ

## 2018-10-15 ENCOUNTER — HOME CARE VISIT (OUTPATIENT)
Dept: HOME HEALTH SERVICES | Facility: HOME HEALTHCARE | Age: 83
End: 2018-10-15

## 2018-11-14 ENCOUNTER — TELEPHONE (OUTPATIENT)
Dept: MEDICAL GROUP | Facility: MEDICAL CENTER | Age: 83
End: 2018-11-14

## 2018-11-14 DIAGNOSIS — J45.40 ASTHMA, MODERATE PERSISTENT, POORLY-CONTROLLED: ICD-10-CM

## 2018-11-14 DIAGNOSIS — N39.0 RECURRENT UTI: ICD-10-CM

## 2018-11-14 DIAGNOSIS — F33.2 SEVERE EPISODE OF RECURRENT MAJOR DEPRESSIVE DISORDER, WITHOUT PSYCHOTIC FEATURES (HCC): ICD-10-CM

## 2018-11-14 DIAGNOSIS — J44.9 CHRONIC OBSTRUCTIVE PULMONARY DISEASE, UNSPECIFIED COPD TYPE (HCC): ICD-10-CM

## 2018-11-14 NOTE — TELEPHONE ENCOUNTER
Patient's daughter is requesting referral for home health. She states that her brother Roni, is getting older and forgetful and suffered a recent back injury from a fall. Patient needs help bathing and getting around.

## 2018-11-18 ENCOUNTER — APPOINTMENT (OUTPATIENT)
Dept: RADIOLOGY | Facility: MEDICAL CENTER | Age: 83
DRG: 481 | End: 2018-11-18
Attending: ORTHOPAEDIC SURGERY
Payer: MEDICARE

## 2018-11-18 ENCOUNTER — APPOINTMENT (OUTPATIENT)
Dept: RADIOLOGY | Facility: MEDICAL CENTER | Age: 83
DRG: 481 | End: 2018-11-18
Attending: EMERGENCY MEDICINE
Payer: MEDICARE

## 2018-11-18 ENCOUNTER — HOSPITAL ENCOUNTER (INPATIENT)
Facility: MEDICAL CENTER | Age: 83
LOS: 3 days | DRG: 481 | End: 2018-11-21
Attending: EMERGENCY MEDICINE | Admitting: INTERNAL MEDICINE
Payer: MEDICARE

## 2018-11-18 DIAGNOSIS — S00.03XA CONTUSION OF SCALP, INITIAL ENCOUNTER: ICD-10-CM

## 2018-11-18 DIAGNOSIS — S51.812D SKIN TEAR OF LEFT FOREARM WITHOUT COMPLICATION, SUBSEQUENT ENCOUNTER: ICD-10-CM

## 2018-11-18 DIAGNOSIS — S00.432A: ICD-10-CM

## 2018-11-18 DIAGNOSIS — S72.142A CLOSED DISPLACED INTERTROCHANTERIC FRACTURE OF LEFT FEMUR, INITIAL ENCOUNTER (HCC): Chronic | ICD-10-CM

## 2018-11-18 DIAGNOSIS — S72.002A CLOSED LEFT HIP FRACTURE, INITIAL ENCOUNTER (HCC): ICD-10-CM
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
## 2018-11-18 LAB
ALBUMIN SERPL BCP-MCNC: 3.6 G/DL (ref 3.2–4.9)
ALBUMIN/GLOB SERPL: 1.4 G/DL
ALP SERPL-CCNC: 98 U/L (ref 30–99)
ALT SERPL-CCNC: 8 U/L (ref 2–50)
ANION GAP SERPL CALC-SCNC: 6 MMOL/L (ref 0–11.9)
APPEARANCE UR: ABNORMAL
APTT PPP: 25.5 SEC (ref 24.7–36)
AST SERPL-CCNC: 13 U/L (ref 12–45)
BACTERIA #/AREA URNS HPF: ABNORMAL /HPF
BASOPHILS # BLD AUTO: 0.4 % (ref 0–1.8)
BASOPHILS # BLD: 0.04 K/UL (ref 0–0.12)
BILIRUB SERPL-MCNC: 0.5 MG/DL (ref 0.1–1.5)
BILIRUB UR QL STRIP.AUTO: NEGATIVE
BUN SERPL-MCNC: 13 MG/DL (ref 8–22)
CALCIUM SERPL-MCNC: 8.7 MG/DL (ref 8.5–10.5)
CHLORIDE SERPL-SCNC: 105 MMOL/L (ref 96–112)
CO2 SERPL-SCNC: 26 MMOL/L (ref 20–33)
COLOR UR: YELLOW
CREAT SERPL-MCNC: 1.03 MG/DL (ref 0.5–1.4)
EOSINOPHIL # BLD AUTO: 0.16 K/UL (ref 0–0.51)
EOSINOPHIL NFR BLD: 1.5 % (ref 0–6.9)
EPI CELLS #/AREA URNS HPF: ABNORMAL /HPF
ERYTHROCYTE [DISTWIDTH] IN BLOOD BY AUTOMATED COUNT: 44.4 FL (ref 35.9–50)
GLOBULIN SER CALC-MCNC: 2.6 G/DL (ref 1.9–3.5)
GLUCOSE SERPL-MCNC: 159 MG/DL (ref 65–99)
GLUCOSE UR STRIP.AUTO-MCNC: NEGATIVE MG/DL
HCT VFR BLD AUTO: 43.1 % (ref 37–47)
HGB BLD-MCNC: 14.8 G/DL (ref 12–16)
IMM GRANULOCYTES # BLD AUTO: 0.13 K/UL (ref 0–0.11)
IMM GRANULOCYTES NFR BLD AUTO: 1.2 % (ref 0–0.9)
INR PPP: 1.02 (ref 0.87–1.13)
KETONES UR STRIP.AUTO-MCNC: NEGATIVE MG/DL
LEUKOCYTE ESTERASE UR QL STRIP.AUTO: ABNORMAL
LYMPHOCYTES # BLD AUTO: 3.73 K/UL (ref 1–4.8)
LYMPHOCYTES NFR BLD: 34.4 % (ref 22–41)
MCH RBC QN AUTO: 30 PG (ref 27–33)
MCHC RBC AUTO-ENTMCNC: 34.3 G/DL (ref 33.6–35)
MCV RBC AUTO: 87.2 FL (ref 81.4–97.8)
MICRO URNS: ABNORMAL
MONOCYTES # BLD AUTO: 0.55 K/UL (ref 0–0.85)
MONOCYTES NFR BLD AUTO: 5.1 % (ref 0–13.4)
NEUTROPHILS # BLD AUTO: 6.24 K/UL (ref 2–7.15)
NEUTROPHILS NFR BLD: 57.4 % (ref 44–72)
NITRITE UR QL STRIP.AUTO: NEGATIVE
NRBC # BLD AUTO: 0 K/UL
NRBC BLD-RTO: 0 /100 WBC
PH UR STRIP.AUTO: 6 [PH]
PLATELET # BLD AUTO: 249 K/UL (ref 164–446)
PMV BLD AUTO: 9.4 FL (ref 9–12.9)
POTASSIUM SERPL-SCNC: 3.8 MMOL/L (ref 3.6–5.5)
PROT SERPL-MCNC: 6.2 G/DL (ref 6–8.2)
PROT UR QL STRIP: 30 MG/DL
PROTHROMBIN TIME: 13.5 SEC (ref 12–14.6)
RBC # BLD AUTO: 4.94 M/UL (ref 4.2–5.4)
RBC # URNS HPF: ABNORMAL /HPF
RBC UR QL AUTO: ABNORMAL
SODIUM SERPL-SCNC: 137 MMOL/L (ref 135–145)
SP GR UR STRIP.AUTO: 1.01
TROPONIN I SERPL-MCNC: 0.02 NG/ML (ref 0–0.04)
UROBILINOGEN UR STRIP.AUTO-MCNC: 0.2 MG/DL
WBC # BLD AUTO: 10.9 K/UL (ref 4.8–10.8)
WBC #/AREA URNS HPF: ABNORMAL /HPF

## 2018-11-18 PROCEDURE — 93005 ELECTROCARDIOGRAM TRACING: CPT | Performed by: EMERGENCY MEDICINE

## 2018-11-18 PROCEDURE — 99223 1ST HOSP IP/OBS HIGH 75: CPT | Mod: AI | Performed by: INTERNAL MEDICINE

## 2018-11-18 PROCEDURE — C1713 ANCHOR/SCREW BN/BN,TIS/BN: HCPCS | Performed by: ORTHOPAEDIC SURGERY

## 2018-11-18 PROCEDURE — 770006 HCHG ROOM/CARE - MED/SURG/GYN SEMI*

## 2018-11-18 PROCEDURE — 84484 ASSAY OF TROPONIN QUANT: CPT

## 2018-11-18 PROCEDURE — 160048 HCHG OR STATISTICAL LEVEL 1-5: Performed by: ORTHOPAEDIC SURGERY

## 2018-11-18 PROCEDURE — 700111 HCHG RX REV CODE 636 W/ 250 OVERRIDE (IP): Performed by: EMERGENCY MEDICINE

## 2018-11-18 PROCEDURE — 96376 TX/PRO/DX INJ SAME DRUG ADON: CPT

## 2018-11-18 PROCEDURE — 700111 HCHG RX REV CODE 636 W/ 250 OVERRIDE (IP): Performed by: ORTHOPAEDIC SURGERY

## 2018-11-18 PROCEDURE — 502240 HCHG MISC OR SUPPLY RC 0272: Performed by: ORTHOPAEDIC SURGERY

## 2018-11-18 PROCEDURE — 73502 X-RAY EXAM HIP UNI 2-3 VIEWS: CPT | Mod: LT

## 2018-11-18 PROCEDURE — 87086 URINE CULTURE/COLONY COUNT: CPT

## 2018-11-18 PROCEDURE — 160035 HCHG PACU - 1ST 60 MINS PHASE I: Performed by: ORTHOPAEDIC SURGERY

## 2018-11-18 PROCEDURE — 160041 HCHG SURGERY MINUTES - EA ADDL 1 MIN LEVEL 4: Performed by: ORTHOPAEDIC SURGERY

## 2018-11-18 PROCEDURE — 160009 HCHG ANES TIME/MIN: Performed by: ORTHOPAEDIC SURGERY

## 2018-11-18 PROCEDURE — A9270 NON-COVERED ITEM OR SERVICE: HCPCS | Performed by: ANESTHESIOLOGY

## 2018-11-18 PROCEDURE — 72125 CT NECK SPINE W/O DYE: CPT

## 2018-11-18 PROCEDURE — 73700 CT LOWER EXTREMITY W/O DYE: CPT | Mod: LT

## 2018-11-18 PROCEDURE — 85730 THROMBOPLASTIN TIME PARTIAL: CPT

## 2018-11-18 PROCEDURE — 770001 HCHG ROOM/CARE - MED/SURG/GYN PRIV*

## 2018-11-18 PROCEDURE — 700111 HCHG RX REV CODE 636 W/ 250 OVERRIDE (IP)

## 2018-11-18 PROCEDURE — 81001 URINALYSIS AUTO W/SCOPE: CPT

## 2018-11-18 PROCEDURE — 700105 HCHG RX REV CODE 258: Performed by: INTERNAL MEDICINE

## 2018-11-18 PROCEDURE — 304561 HCHG STAT O2

## 2018-11-18 PROCEDURE — 160036 HCHG PACU - EA ADDL 30 MINS PHASE I: Performed by: ORTHOPAEDIC SURGERY

## 2018-11-18 PROCEDURE — 700102 HCHG RX REV CODE 250 W/ 637 OVERRIDE(OP): Performed by: ANESTHESIOLOGY

## 2018-11-18 PROCEDURE — 700101 HCHG RX REV CODE 250: Performed by: INTERNAL MEDICINE

## 2018-11-18 PROCEDURE — 80053 COMPREHEN METABOLIC PANEL: CPT

## 2018-11-18 PROCEDURE — 0QS736Z REPOSITION LEFT UPPER FEMUR WITH INTRAMEDULLARY INTERNAL FIXATION DEVICE, PERCUTANEOUS APPROACH: ICD-10-PCS | Performed by: ORTHOPAEDIC SURGERY

## 2018-11-18 PROCEDURE — 85025 COMPLETE CBC W/AUTO DIFF WBC: CPT

## 2018-11-18 PROCEDURE — 85610 PROTHROMBIN TIME: CPT

## 2018-11-18 PROCEDURE — A9270 NON-COVERED ITEM OR SERVICE: HCPCS | Performed by: INTERNAL MEDICINE

## 2018-11-18 PROCEDURE — 501838 HCHG SUTURE GENERAL: Performed by: ORTHOPAEDIC SURGERY

## 2018-11-18 PROCEDURE — 36415 COLL VENOUS BLD VENIPUNCTURE: CPT

## 2018-11-18 PROCEDURE — 99291 CRITICAL CARE FIRST HOUR: CPT

## 2018-11-18 PROCEDURE — 700101 HCHG RX REV CODE 250

## 2018-11-18 PROCEDURE — 160002 HCHG RECOVERY MINUTES (STAT): Performed by: ORTHOPAEDIC SURGERY

## 2018-11-18 PROCEDURE — 70450 CT HEAD/BRAIN W/O DYE: CPT

## 2018-11-18 PROCEDURE — 73501 X-RAY EXAM HIP UNI 1 VIEW: CPT | Mod: LT

## 2018-11-18 PROCEDURE — 700111 HCHG RX REV CODE 636 W/ 250 OVERRIDE (IP): Performed by: INTERNAL MEDICINE

## 2018-11-18 PROCEDURE — 96374 THER/PROPH/DIAG INJ IV PUSH: CPT

## 2018-11-18 PROCEDURE — 700102 HCHG RX REV CODE 250 W/ 637 OVERRIDE(OP): Performed by: INTERNAL MEDICINE

## 2018-11-18 PROCEDURE — 160029 HCHG SURGERY MINUTES - 1ST 30 MINS LEVEL 4: Performed by: ORTHOPAEDIC SURGERY

## 2018-11-18 PROCEDURE — 500424 HCHG DRESSING, AIRSTRIP: Performed by: ORTHOPAEDIC SURGERY

## 2018-11-18 DEVICE — IMPLANTABLE DEVICE: Type: IMPLANTABLE DEVICE | Site: HIP | Status: FUNCTIONAL

## 2018-11-18 DEVICE — SCREW FOR IM NAILS TI LOCKING WITH T25 STARDRIVE 5.0MM 36MM (2TX2=4): Type: IMPLANTABLE DEVICE | Site: HIP | Status: FUNCTIONAL

## 2018-11-18 RX ORDER — PREDNISONE 5 MG/1
5 TABLET ORAL
Status: DISCONTINUED | OUTPATIENT
Start: 2018-11-19 | End: 2018-11-21 | Stop reason: HOSPADM

## 2018-11-18 RX ORDER — METOPROLOL TARTRATE 1 MG/ML
1 INJECTION, SOLUTION INTRAVENOUS
Status: DISCONTINUED | OUTPATIENT
Start: 2018-11-18 | End: 2018-11-18 | Stop reason: HOSPADM

## 2018-11-18 RX ORDER — CEFAZOLIN SODIUM 1 G/50ML
1 INJECTION, SOLUTION INTRAVENOUS EVERY 8 HOURS
Status: COMPLETED | OUTPATIENT
Start: 2018-11-18 | End: 2018-11-19

## 2018-11-18 RX ORDER — SODIUM CHLORIDE, SODIUM LACTATE, POTASSIUM CHLORIDE, CALCIUM CHLORIDE 600; 310; 30; 20 MG/100ML; MG/100ML; MG/100ML; MG/100ML
INJECTION, SOLUTION INTRAVENOUS CONTINUOUS
Status: DISCONTINUED | OUTPATIENT
Start: 2018-11-18 | End: 2018-11-18 | Stop reason: HOSPADM

## 2018-11-18 RX ORDER — AMLODIPINE BESYLATE 5 MG/1
5 TABLET ORAL 2 TIMES DAILY
Status: DISCONTINUED | OUTPATIENT
Start: 2018-11-18 | End: 2018-11-21 | Stop reason: HOSPADM

## 2018-11-18 RX ORDER — SODIUM CHLORIDE 9 MG/ML
INJECTION, SOLUTION INTRAVENOUS CONTINUOUS
Status: DISCONTINUED | OUTPATIENT
Start: 2018-11-18 | End: 2018-11-20

## 2018-11-18 RX ORDER — HYDRALAZINE HYDROCHLORIDE 20 MG/ML
5 INJECTION INTRAMUSCULAR; INTRAVENOUS
Status: DISCONTINUED | OUTPATIENT
Start: 2018-11-18 | End: 2018-11-18 | Stop reason: HOSPADM

## 2018-11-18 RX ORDER — HALOPERIDOL 5 MG/ML
1 INJECTION INTRAMUSCULAR
Status: DISCONTINUED | OUTPATIENT
Start: 2018-11-18 | End: 2018-11-18 | Stop reason: HOSPADM

## 2018-11-18 RX ORDER — MORPHINE SULFATE 10 MG/ML
2 INJECTION, SOLUTION INTRAMUSCULAR; INTRAVENOUS EVERY 4 HOURS PRN
Status: DISCONTINUED | OUTPATIENT
Start: 2018-11-18 | End: 2018-11-21 | Stop reason: HOSPADM

## 2018-11-18 RX ORDER — ALBUTEROL SULFATE 90 UG/1
2 AEROSOL, METERED RESPIRATORY (INHALATION) EVERY 6 HOURS PRN
Status: DISCONTINUED | OUTPATIENT
Start: 2018-11-18 | End: 2018-11-21 | Stop reason: HOSPADM

## 2018-11-18 RX ORDER — LABETALOL HYDROCHLORIDE 5 MG/ML
5 INJECTION, SOLUTION INTRAVENOUS
Status: DISCONTINUED | OUTPATIENT
Start: 2018-11-18 | End: 2018-11-18 | Stop reason: HOSPADM

## 2018-11-18 RX ORDER — LANOLIN ALCOHOL/MO/W.PET/CERES
3 CREAM (GRAM) TOPICAL
COMMUNITY

## 2018-11-18 RX ORDER — POLYETHYLENE GLYCOL 3350 17 G/17G
1 POWDER, FOR SOLUTION ORAL
Status: DISCONTINUED | OUTPATIENT
Start: 2018-11-18 | End: 2018-11-21 | Stop reason: HOSPADM

## 2018-11-18 RX ORDER — ALBUTEROL SULFATE 90 UG/1
2 AEROSOL, METERED RESPIRATORY (INHALATION) EVERY 6 HOURS PRN
COMMUNITY

## 2018-11-18 RX ORDER — ACETAMINOPHEN 325 MG/1
650 TABLET ORAL EVERY 6 HOURS PRN
Status: DISCONTINUED | OUTPATIENT
Start: 2018-11-18 | End: 2018-11-21 | Stop reason: HOSPADM

## 2018-11-18 RX ORDER — AMOXICILLIN 250 MG
2 CAPSULE ORAL 2 TIMES DAILY
Status: DISCONTINUED | OUTPATIENT
Start: 2018-11-18 | End: 2018-11-21 | Stop reason: HOSPADM

## 2018-11-18 RX ORDER — MAGNESIUM HYDROXIDE 1200 MG/15ML
LIQUID ORAL
Status: COMPLETED | OUTPATIENT
Start: 2018-11-18 | End: 2018-11-18

## 2018-11-18 RX ORDER — LABETALOL HYDROCHLORIDE 5 MG/ML
10 INJECTION, SOLUTION INTRAVENOUS EVERY 4 HOURS PRN
Status: DISCONTINUED | OUTPATIENT
Start: 2018-11-18 | End: 2018-11-21 | Stop reason: HOSPADM

## 2018-11-18 RX ORDER — BISACODYL 10 MG
10 SUPPOSITORY, RECTAL RECTAL
Status: DISCONTINUED | OUTPATIENT
Start: 2018-11-18 | End: 2018-11-21 | Stop reason: HOSPADM

## 2018-11-18 RX ORDER — LISINOPRIL 20 MG/1
60 TABLET ORAL EVERY MORNING
Status: DISCONTINUED | OUTPATIENT
Start: 2018-11-18 | End: 2018-11-21 | Stop reason: HOSPADM

## 2018-11-18 RX ORDER — ONDANSETRON 2 MG/ML
4 INJECTION INTRAMUSCULAR; INTRAVENOUS
Status: DISCONTINUED | OUTPATIENT
Start: 2018-11-18 | End: 2018-11-18 | Stop reason: HOSPADM

## 2018-11-18 RX ORDER — SERTRALINE HYDROCHLORIDE 100 MG/1
200 TABLET, FILM COATED ORAL DAILY
Status: DISCONTINUED | OUTPATIENT
Start: 2018-11-18 | End: 2018-11-21 | Stop reason: HOSPADM

## 2018-11-18 RX ORDER — OXYCODONE HYDROCHLORIDE 5 MG/1
5 TABLET ORAL EVERY 6 HOURS PRN
Status: DISCONTINUED | OUTPATIENT
Start: 2018-11-18 | End: 2018-11-21 | Stop reason: HOSPADM

## 2018-11-18 RX ADMIN — FENTANYL CITRATE 50 MCG: 50 INJECTION, SOLUTION INTRAMUSCULAR; INTRAVENOUS at 08:45

## 2018-11-18 RX ADMIN — HYDROCODONE BITARTRATE AND ACETAMINOPHEN 15 ML: 2.5; 108 SOLUTION ORAL at 13:59

## 2018-11-18 RX ADMIN — LABETALOL HYDROCHLORIDE 10 MG: 5 INJECTION, SOLUTION INTRAVENOUS at 20:58

## 2018-11-18 RX ADMIN — CEFAZOLIN SODIUM 1 G: 1 INJECTION, SOLUTION INTRAVENOUS at 17:49

## 2018-11-18 RX ADMIN — FENTANYL CITRATE 50 MCG: 50 INJECTION, SOLUTION INTRAMUSCULAR; INTRAVENOUS at 07:15

## 2018-11-18 RX ADMIN — STANDARDIZED SENNA CONCENTRATE AND DOCUSATE SODIUM 2 TABLET: 8.6; 5 TABLET, FILM COATED ORAL at 17:49

## 2018-11-18 RX ADMIN — AMLODIPINE BESYLATE 5 MG: 5 TABLET ORAL at 17:36

## 2018-11-18 RX ADMIN — LISINOPRIL 60 MG: 20 TABLET ORAL at 17:36

## 2018-11-18 RX ADMIN — FENTANYL CITRATE 50 MCG: 50 INJECTION, SOLUTION INTRAMUSCULAR; INTRAVENOUS at 06:29

## 2018-11-18 RX ADMIN — MORPHINE SULFATE 2 MG: 10 INJECTION INTRAVENOUS at 19:14

## 2018-11-18 RX ADMIN — SODIUM CHLORIDE, SODIUM LACTATE, POTASSIUM CHLORIDE, CALCIUM CHLORIDE: 600; 310; 30; 20 INJECTION, SOLUTION INTRAVENOUS at 13:50

## 2018-11-18 RX ADMIN — MORPHINE SULFATE 2 MG: 10 INJECTION INTRAVENOUS at 23:16

## 2018-11-18 RX ADMIN — SERTRALINE 200 MG: 100 TABLET, FILM COATED ORAL at 17:36

## 2018-11-18 RX ADMIN — SODIUM CHLORIDE: 9 INJECTION, SOLUTION INTRAVENOUS at 19:18

## 2018-11-18 RX ADMIN — FENTANYL CITRATE 50 MCG: 50 INJECTION, SOLUTION INTRAMUSCULAR; INTRAVENOUS at 07:33

## 2018-11-18 RX ADMIN — OXYCODONE HYDROCHLORIDE 5 MG: 5 TABLET ORAL at 17:36

## 2018-11-18 ASSESSMENT — COPD QUESTIONNAIRES
IN THE PAST 12 MONTHS DO YOU DO LESS THAN YOU USED TO BECAUSE OF YOUR BREATHING PROBLEMS: DISAGREE/UNSURE
DO YOU EVER COUGH UP ANY MUCUS OR PHLEGM?: YES, A FEW DAYS A WEEK OR MONTH
HAVE YOU SMOKED AT LEAST 100 CIGARETTES IN YOUR ENTIRE LIFE: YES
DURING THE PAST 4 WEEKS HOW MUCH DID YOU FEEL SHORT OF BREATH: SOME OF THE TIME
COPD SCREENING SCORE: 6

## 2018-11-18 ASSESSMENT — PATIENT HEALTH QUESTIONNAIRE - PHQ9
8. MOVING OR SPEAKING SO SLOWLY THAT OTHER PEOPLE COULD HAVE NOTICED. OR THE OPPOSITE, BEING SO FIGETY OR RESTLESS THAT YOU HAVE BEEN MOVING AROUND A LOT MORE THAN USUAL: NOT AT ALL
5. POOR APPETITE OR OVEREATING: NOT AT ALL
6. FEELING BAD ABOUT YOURSELF - OR THAT YOU ARE A FAILURE OR HAVE LET YOURSELF OR YOUR FAMILY DOWN: NOT AL ALL
9. THOUGHTS THAT YOU WOULD BE BETTER OFF DEAD, OR OF HURTING YOURSELF: NOT AT ALL
7. TROUBLE CONCENTRATING ON THINGS, SUCH AS READING THE NEWSPAPER OR WATCHING TELEVISION: NOT AT ALL
4. FEELING TIRED OR HAVING LITTLE ENERGY: NEARLY EVERY DAY
1. LITTLE INTEREST OR PLEASURE IN DOING THINGS: NOT AT ALL
3. TROUBLE FALLING OR STAYING ASLEEP OR SLEEPING TOO MUCH: MORE THAN HALF THE DAYS
2. FEELING DOWN, DEPRESSED, IRRITABLE, OR HOPELESS: SEVERAL DAYS
SUM OF ALL RESPONSES TO PHQ9 QUESTIONS 1 AND 2: 1
SUM OF ALL RESPONSES TO PHQ QUESTIONS 1-9: 6

## 2018-11-18 ASSESSMENT — PAIN SCALES - GENERAL
PAINLEVEL_OUTOF10: 10
PAINLEVEL_OUTOF10: 5
PAINLEVEL_OUTOF10: 8
PAINLEVEL_OUTOF10: 7
PAINLEVEL_OUTOF10: 5
PAINLEVEL_OUTOF10: 8
PAINLEVEL_OUTOF10: 8
PAINLEVEL_OUTOF10: 5

## 2018-11-18 ASSESSMENT — ENCOUNTER SYMPTOMS
VOMITING: 0
HEARTBURN: 0
SEIZURES: 0
FOCAL WEAKNESS: 0
ABDOMINAL PAIN: 0
NAUSEA: 0
SPUTUM PRODUCTION: 0
HEADACHES: 0
DIZZINESS: 0
SHORTNESS OF BREATH: 0
COUGH: 0
PALPITATIONS: 0
DIARRHEA: 0
CONSTIPATION: 0
ORTHOPNEA: 0
CHILLS: 0
SPEECH CHANGE: 0
HALLUCINATIONS: 0
HEMOPTYSIS: 0
DOUBLE VISION: 0
MYALGIAS: 1
BLURRED VISION: 0
FEVER: 0
SENSORY CHANGE: 0
FALLS: 1

## 2018-11-18 ASSESSMENT — LIFESTYLE VARIABLES
EVER FELT BAD OR GUILTY ABOUT YOUR DRINKING: NO
HAVE YOU EVER FELT YOU SHOULD CUT DOWN ON YOUR DRINKING: NO
HOW MANY TIMES IN THE PAST YEAR HAVE YOU HAD 5 OR MORE DRINKS IN A DAY: 0
DO YOU DRINK ALCOHOL: NO
EVER_SMOKED: YES
HAVE PEOPLE ANNOYED YOU BY CRITICIZING YOUR DRINKING: NO
EVER HAD A DRINK FIRST THING IN THE MORNING TO STEADY YOUR NERVES TO GET RID OF A HANGOVER: NO
AVERAGE NUMBER OF DAYS PER WEEK YOU HAVE A DRINK CONTAINING ALCOHOL: 3
TOTAL SCORE: 0
ON A TYPICAL DAY WHEN YOU DRINK ALCOHOL HOW MANY DRINKS DO YOU HAVE: 1
ALCOHOL_USE: YES
CONSUMPTION TOTAL: NEGATIVE
TOTAL SCORE: 0
TOTAL SCORE: 0

## 2018-11-18 ASSESSMENT — COGNITIVE AND FUNCTIONAL STATUS - GENERAL
SUGGESTED CMS G CODE MODIFIER DAILY ACTIVITY: CK
CLIMB 3 TO 5 STEPS WITH RAILING: A LOT
MOBILITY SCORE: 12
HELP NEEDED FOR BATHING: A LOT
MOVING TO AND FROM BED TO CHAIR: A LOT
TURNING FROM BACK TO SIDE WHILE IN FLAT BAD: A LOT
MOVING FROM LYING ON BACK TO SITTING ON SIDE OF FLAT BED: A LOT
WALKING IN HOSPITAL ROOM: A LOT
DRESSING REGULAR UPPER BODY CLOTHING: A LITTLE
DRESSING REGULAR LOWER BODY CLOTHING: A LOT
DAILY ACTIVITIY SCORE: 17
TOILETING: A LOT
SUGGESTED CMS G CODE MODIFIER MOBILITY: CL
STANDING UP FROM CHAIR USING ARMS: A LOT

## 2018-11-18 NOTE — OR SURGEON
Immediate Post OP Note    PreOp Diagnosis: left hip basicervical fracture    PostOp Diagnosis: Same    Procedure(s):  HIP CEPHALOMEDULLARY NAIL - Wound Class: Clean    Surgeon(s):  Pardeep Middleton M.D.    Anesthesiologist/Type of Anesthesia:  Anesthesiologist: Satnam Tomas M.D./General    Surgical Staff:  Circulator: Billy Costello RGUIDO; Monik Lizarraga R.NConchis  Scrub Person: Luigi Lunsford  First Assist: Aretha Ibanez  Radiology Technologist: Claudio Nicholson    Specimens removed if any:  * No specimens in log *    Estimated Blood Loss: 15 cc    Findings: Left basicervical hip fracture    Complications: None        11/18/2018 1:03 PM Pardeep Middleton M.D.

## 2018-11-18 NOTE — CONSULTS
Orthopaedic Surgery Consult Note:    Pardeep Middleton  Date & Time note created:    11/18/2018   9:04 AM     Referring MD:  Dr. Beatty    Patient ID:   Name:             Laney Watt   YOB: 1921  Age:                 89 y.o.  female   MRN:               [id number]                                                             Reason for Consult:        Left hip fracture  History of Present Illness:    89yo F with left hip fracture after a ground level fall last night while going to the bathroom.  The patient fell and was unable to ambulate therafter.  She normally walks with a walker. She last ate or drank last night.      Review of Systems:      Constitutional: Denies fevers, Denies weight changes  Eyes: Denies changes in vision, no eye pain  Ears/Nose/Throat/Mouth: Denies nasal congestion or sore throat   Cardiovascular: Denies chest pain   Respiratory: Denies shortness of breath , Denies cough  Gastrointestinal/Hepatic: Denies abdominal pain, nausea, vomiting, diarrhea, constipation or GI bleeding   Genitourinary: Denies dysuria or frequency  Musculoskeletal/Rheum: left hip pain, no numbness or tingling  Skin: Denies rash  Neurological: Denies headache, confusion, memory loss or focal weakness/parasthesias  Psychiatric: denies mood disorder   Endocrine: Stella thyroid problems  Heme/Oncology/Lymph Nodes: Denies enlarged lymph nodes, denies brusing or known bleeding disorder  All other systems were reviewed and are negative (AMA/CMS criteria)                Past Medical History:   Past Medical History:   Diagnosis Date   • Arthritis    • ASTHMA    • Depression    • Hypertension    • Hypoxia    • Insomnia      There are no active hospital problems to display for this patient.      Past Surgical History:  Past Surgical History:   Procedure Laterality Date   • APPENDECTOMY     • HYSTERECTOMY, TOTAL ABDOMINAL      Complete, FREDDY/BSO   • TONSILLECTOMY         Hospital Medications:  No current  "facility-administered medications for this encounter.     Current Outpatient Prescriptions:   •  albuterol 108 (90 Base) MCG/ACT Aero Soln inhalation aerosol, Inhale 2 Puffs by mouth every 6 hours as needed for Shortness of Breath., Disp: , Rfl:   •  melatonin 3 MG Tab, Take 3 mg by mouth every bedtime., Disp: , Rfl:   •  lisinopril (PRINIVIL, ZESTRIL) 40 MG tablet, Take 1.5 Tabs by mouth every morning., Disp: 135 Tab, Rfl: 3  •  sertraline (ZOLOFT) 100 MG Tab, Take 2 Tabs by mouth every day., Disp: 180 Tab, Rfl: 3  •  predniSONE (DELTASONE) 5 MG Tab, Take 1 Tab by mouth every morning with breakfast., Disp: 90 Tab, Rfl: 3  •  amlodipine (NORVASC) 5 MG Tab, Take 1 Tab by mouth 2 Times a Day., Disp: 180 Tab, Rfl: 3    Current Outpatient Medications:    (Not in a hospital admission)    Medication Allergy:  Allergies   Allergen Reactions   • Codeine Nausea       Family History:  Family History   Problem Relation Age of Onset   • Diabetes Mother    • Asthma Father    • Diabetes Sister        Social History:  Social History     Social History   • Marital status:      Spouse name: N/A   • Number of children: N/A   • Years of education: N/A     Occupational History   • Not on file.     Social History Main Topics   • Smoking status: Former Smoker     Packs/day: 0.25     Years: 20.00     Types: Cigarettes     Quit date: 7/10/1965   • Smokeless tobacco: Never Used      Comment: 20 years, 1/2 ppd   • Alcohol use 8.4 oz/week     14 Glasses of wine per week      Comment: occasionally   • Drug use: No   • Sexual activity: No     Other Topics Concern   • Not on file     Social History Narrative   • No narrative on file         Physical Exam:  Vitals/ General Appearance:   Weight/BMI: Body mass index is 20.43 kg/m².  Blood pressure 150/61, pulse 65, temperature 36.1 °C (97 °F), temperature source Temporal, resp. rate 16, height 1.626 m (5' 4\"), weight 54 kg (119 lb 0.8 oz), SpO2 93 %, not currently breastfeeding.  Vitals:    " 11/18/18 0604 11/18/18 0612 11/18/18 0630 11/18/18 0730   BP:  150/61     Pulse:  71 68 65   Resp:  16 16    Temp: 36.1 °C (97 °F)      TempSrc: Temporal      SpO2:  96% 97% 93%   Weight:       Height:           Constitutional:   Well developed, Well nourished, No acute distress  HENMT:  Normocephalic, Atraumatic, Oropharynx moist mucous membranes, No oral exudates, Nose normal.  No thyromegaly.  Eyes:  EOMI, Conjunctiva normal, No discharge.  Neck:  Normal range of motion, No cervical tenderness,  no JVD.  Cardiovascular:  Regular rate and rhythm  Lungs:  Normal breathing  Abdomen: Soft, non-tender, non-distended.  Skin: Warm, Dry, No erythema, No rash, no induration.  Neurologic: Alert & oriented x 3, No focal deficits noted, cranial nerves II through X are grossly intact.  Psychiatric: Affect normal, Judgment normal, Mood normal.  Musculoskeletal: left hip short, externally rotated.  Tender over her left hip.  +EHL/FHL/TA  SILT m/l/1st dws  wwp toes    Lab Data Review:  Recent Results (from the past 24 hour(s))   CBC w/ Differential    Collection Time: 11/18/18  6:00 AM   Result Value Ref Range    WBC 10.9 (H) 4.8 - 10.8 K/uL    RBC 4.94 4.20 - 5.40 M/uL    Hemoglobin 14.8 12.0 - 16.0 g/dL    Hematocrit 43.1 37.0 - 47.0 %    MCV 87.2 81.4 - 97.8 fL    MCH 30.0 27.0 - 33.0 pg    MCHC 34.3 33.6 - 35.0 g/dL    RDW 44.4 35.9 - 50.0 fL    Platelet Count 249 164 - 446 K/uL    MPV 9.4 9.0 - 12.9 fL    Neutrophils-Polys 57.40 44.00 - 72.00 %    Lymphocytes 34.40 22.00 - 41.00 %    Monocytes 5.10 0.00 - 13.40 %    Eosinophils 1.50 0.00 - 6.90 %    Basophils 0.40 0.00 - 1.80 %    Immature Granulocytes 1.20 (H) 0.00 - 0.90 %    Nucleated RBC 0.00 /100 WBC    Neutrophils (Absolute) 6.24 2.00 - 7.15 K/uL    Lymphs (Absolute) 3.73 1.00 - 4.80 K/uL    Monos (Absolute) 0.55 0.00 - 0.85 K/uL    Eos (Absolute) 0.16 0.00 - 0.51 K/uL    Baso (Absolute) 0.04 0.00 - 0.12 K/uL    Immature Granulocytes (abs) 0.13 (H) 0.00 - 0.11 K/uL     NRBC (Absolute) 0.00 K/uL   Complete Metabolic Panel (CMP)    Collection Time: 18  6:00 AM   Result Value Ref Range    Sodium 137 135 - 145 mmol/L    Potassium 3.8 3.6 - 5.5 mmol/L    Chloride 105 96 - 112 mmol/L    Co2 26 20 - 33 mmol/L    Anion Gap 6.0 0.0 - 11.9    Glucose 159 (H) 65 - 99 mg/dL    Bun 13 8 - 22 mg/dL    Creatinine 1.03 0.50 - 1.40 mg/dL    Calcium 8.7 8.5 - 10.5 mg/dL    AST(SGOT) 13 12 - 45 U/L    ALT(SGPT) 8 2 - 50 U/L    Alkaline Phosphatase 98 30 - 99 U/L    Total Bilirubin 0.5 0.1 - 1.5 mg/dL    Albumin 3.6 3.2 - 4.9 g/dL    Total Protein 6.2 6.0 - 8.2 g/dL    Globulin 2.6 1.9 - 3.5 g/dL    A-G Ratio 1.4 g/dL   Troponin    Collection Time: 18  6:00 AM   Result Value Ref Range    Troponin I 0.02 0.00 - 0.04 ng/mL   APTT    Collection Time: 18  6:00 AM   Result Value Ref Range    APTT 25.5 24.7 - 36.0 sec   Prothrombin (PT/INR)    Collection Time: 18  6:00 AM   Result Value Ref Range    PT 13.5 12.0 - 14.6 sec    INR 1.02 0.87 - 1.13   ESTIMATED GFR    Collection Time: 18  6:00 AM   Result Value Ref Range    GFR If African American 60 >60 mL/min/1.73 m 2    GFR If Non African American 49 (A) >60 mL/min/1.73 m 2   EKG (NOW)    Collection Time: 18  6:58 AM   Result Value Ref Range    Report       [facility] Emergency Dept.    Test Date:  2018  Pt Name:    MANI CHAVEZ                Department: ER  MRN:        [id number]                      Room:        14  Gender:     Female                       Technician: [id number]  :        [id number]                   Requested By:FLAVIO GONCALVES  Order #:    [id number]                    Reading MD:    Measurements  Intervals                                Axis  Rate:       67                           P:          80  HI:         200                          QRS:        -59  QRSD:       124                          T:          89  QT:         456  QTc:        482    Interpretive  Statements  SINUS RHYTHM  CONSIDER LEFT ATRIAL ABNORMALITY  LEFT BUNDLE BRANCH BLOCK  Compared to ECG 09/10/2014 12:02:26  Left bundle-branch block now present  Ventricular premature complex(es) no longer present  Left anterior fascicular block no longer present  T-wave abnormality no longer present  Possible ischemia no longer present     Urinalysis, culture if indicated    Collection Time: 11/18/18  8:00 AM   Result Value Ref Range    Color Yellow     Character Cloudy (A)     Specific Gravity 1.014 <1.035    Ph 6.0 5.0 - 8.0    Glucose Negative Negative mg/dL    Ketones Negative Negative mg/dL    Protein 30 (A) Negative mg/dL    Bilirubin Negative Negative    Urobilinogen, Urine 0.2 Negative    Nitrite Negative Negative    Leukocyte Esterase Large (A) Negative    Occult Blood Moderate (A) Negative    Micro Urine Req Microscopic    URINE MICROSCOPIC (W/UA)    Collection Time: 11/18/18  8:00 AM   Result Value Ref Range    WBC  (A) /hpf    RBC 5-10 (A) /hpf    Bacteria Moderate (A) None /hpf    Epithelial Cells Rare /hpf       Imaging:   CT-HEAD W/O   Final Result      Normal CT scan of the head without contrast.               INTERPRETING LOCATION:  [address] [place] [address], [id number]      CT-CSPINE WITHOUT PLUS RECONS   Final Result      No acute fracture or traumatic subluxation.      DX-HIP-UNILATERAL-WITH PELVIS-1 VIEW LEFT   Final Result      Left femoral neck fracture.      DX-PELVIS-1 OR 2 VIEWS    (Results Pending)   CT-HIP W/O PLUS RECONS LEFT    (Results Pending)       Assessment: 89yo F with left hip fracture    Plan:   NPO for OR  Admit to medicine.   CT scan for operative planning of left hip fracture, basicervical versus femoral neck. Risks and benefits discussed of intramedullary nail.  I discussed a traction view, but the patient has too much pain to tolerate that.  She would prefer a CT scan.      NWB left hip  Pain control  Avery catheter.  DVT prophylaxis per primary day after surgeyr.               Pardeep Middleton M.D.  [facility]

## 2018-11-18 NOTE — ED TRIAGE NOTES
Chief Complaint   Patient presents with   • GLF     Pt states she was walking to the bathroom this evening when she tripped and fell over.  Pt denies dizziness or LOC.  Pt states she hit her left hip and head.  Pt has small laceration on occipital head.  Pt states 5/10 pain in left hip.  Pt lying on rt side in position of comfort and states she is unable to lift her left leg.  + CMS in extremity. Pt denies any blood thinners.  Pt FSBS 130 in transport.  Pt given 100 mcg fentanyl in transport.

## 2018-11-18 NOTE — PROGRESS NOTES
Dr Beatty requesting admission for Hip fracture  Orthopedics have been called by Dr Beatty  Discussed with Dr Garcia, he will evaluate the patient. Do h&p and admitting orders.     Emma Lieberman M.D.  11/18/18  8:04 AM

## 2018-11-18 NOTE — ED NOTES
Med rec updated and complete  Allergies reviewed  Interviewed pt with son at bedside with permission from pt.  Pt reports no vitamins.  Pt reports no antibiotics in the last 30 days.

## 2018-11-18 NOTE — OP REPORT
DATE OF SERVICE:  11/18/2018    LOCATION OF SURGERY:  [facility]    PREOPERATIVE DIAGNOSIS:  Left basicervical hip fracture.    POSTOPERATIVE DIAGNOSIS:  Left basicervical hip fracture.    PROCEDURE:  Left hip open reduction and internal fixation with a Synthes TFN   short nail.    Surgeon: Dr. Middleton    Asst: Aretha Ibanez    ANESTHESIA:  General.    BLOOD LOSS:  15 mL    COMPLICATIONS:  None.    DISPOSITION:  Stable to PACU.    OPERATIVE INDICATIONS:  The patient is a very pleasant 89-year-old female who   presented after a ground level fall while going to the bathroom earlier this   morning.  She had excruciating left hip pain and was brought in by her son for   evaluation.  She was diagnosed with a left basicervical hip fracture that was   confirmed with CT.  She was unable to tolerate a traction view.  Risks and   benefits of the surgery were discussed with the patient including but not   limited to damage to surrounding nerves, arteries and veins, need for   reoperation, pulmonary embolism, infection, malunion, nonunion, reoperation,   or failure to heal.  Despite these risks, the patient did consent for surgery.    OPERATION IN DETAIL:  On 11/18/2018, the patient was seen in the preoperative   area where her left hip was marked.  Patient was brought to the operating   room, placed in the supine position on a fracture table.  After general   anesthetic was administered, the patient was positioned, a fracture reduction   maneuver with external rotation, traction, and then internal rotation was   performed and position confirmed with x-ray.  Patient was placed into the   fracture table with padding at all bony prominences.  Her left arm taped above   her chest.  Satisfied with the reduction, the left hip was then prepped,   marked and draped in the normal sterile fashion.  A formal time-out was   performed and her perioperative Ancef was given.  A 3-cm incision was made   just proximal to the  greater trochanter and a guidewire passed down to the tip   of the greater trochanter.  Both on the AP and the lateral position, the   position was confirmed and shown to be adequate.  This guidewire was then   passed down across the fracture site into the shaft of the medullary canal.    Soft tissue guide was inserted and the proximal reamer passed down through the   medullary canal.  A 130-degree 10 mm Synthes TFN nail was passed down through   the greater trochanter across the fracture site.  The exterior jig was   applied to the nail  A small skin incision made for the head screw. A k-wire was passed in a near center-center position on the AP and lateral view until in subchondral bone.  This measured for a 95mm screw.   The lateral cortex was reamed, and under flouroscopic guidance, the cephalic guidewire   was passed through the prosthesis.  Using the external guide, a percutaneous derotation pin was placed. Satisfied with the position both in the AP   and the lateral position with the tip to apex distance that is acceptable,   the locking screw was passed into the head.  The screw was tightened and   locked into position and the guidewires removed.  A   skin incision was made for the transverse locking screw and carried down to   bone.  A 2.5 mm drill was passed to drill the transverse screw.  This was   measured at 30 mm and placed.  The wounds were copiously irrigated.  Final   x-rays taken showing acceptable reduction and placement of the prosthesis.    The proximal most incision was closed with 0 Vicryl, 2-0 Vicryl, and staples.    The more distal 2 incisions closed with 2-0 Vicryl and staples.  Xeroform,   4x4s, and a sterile dressing applied.  Traction was released.  Patient was   transferred to her bed and into recovery in good condition.  Postoperatively,   the patient will begin DVT prophylaxis per primary starting on day #1 and is   weightbearing as tolerated.  Final films will be taken tomorrow.        ____________________________________     MD ANASTACIO Godinez / TIARRA    DD:  11/18/2018 13:16:30  DT:  11/18/2018 13:37:19    D#:  [id number]  Job#:  [id number]

## 2018-11-18 NOTE — ED PROVIDER NOTES
ED Provider Note    Scribed for Brandon Beatty M.D. by Temitope Espinosa. 11/18/2018  6:54 AM    Primary care provider: Roberto Cota M.D.  Means of arrival: ambulance  History obtained from: patient  History limited by: none    CHIEF COMPLAINT  Chief Complaint   Patient presents with   • GLF       HPI  Laney Watt is a 89 y.o. female who presents to the Emergency Department via ambulance due to a mechanical ground level fall that occurred today. She reports that she fell while walking to the bathroom. The patient states that she hit her head due to the fall, but denies loss of consciousness. SThe patient's pain is severe.  10/10 in severity.  Worse with movement.he has a laceration to her head and abrasions to her let ear and left elbow, but denies neck pain, loss of consciousness, rib pain or any other injuries. Her last oral intake was 8 hours ago. No other medical concerns reported at this time.       REVIEW OF SYSTEMS  Pertinent positives include mechanical ground level fall, head trauma, occipital laceration, abrasion over left ear, left elbow abrasion. Pertinent negatives include no neck pain, loss of consciousness, rib pain.  All other systems reviewed and negative.    PAST MEDICAL HISTORY   has a past medical history of Arthritis; ASTHMA; Depression; Hypertension; Hypoxia; and Insomnia.    SURGICAL HISTORY   has a past surgical history that includes hysterectomy, total abdominal; appendectomy; and tonsillectomy.    SOCIAL HISTORY  Social History   Substance Use Topics   • Smoking status: Former Smoker     Packs/day: 0.25     Years: 20.00     Types: Cigarettes     Quit date: 7/10/1965   • Smokeless tobacco: Never Used      Comment: 20 years, 1/2 ppd   • Alcohol use 8.4 oz/week     14 Glasses of wine per week      Comment: occasionally      History   Drug Use No       FAMILY HISTORY  Family History   Problem Relation Age of Onset   • Diabetes Mother    • Asthma Father    • Diabetes Sister        CURRENT  "MEDICATIONS  Home Medications     Reviewed by Judith Fischer (Pharmacy Tech) on 11/18/18 at 0836  Med List Status: Complete   Medication Last Dose Status   albuterol 108 (90 Base) MCG/ACT Aero Soln inhalation aerosol 11/17/2018 Active   amlodipine (NORVASC) 5 MG Tab 11/17/2018 Active   lisinopril (PRINIVIL, ZESTRIL) 40 MG tablet 11/17/2018 Active   melatonin 3 MG Tab 11/17/2018 Active   predniSONE (DELTASONE) 5 MG Tab 11/17/2018 Active   sertraline (ZOLOFT) 100 MG Tab 11/17/2018 Active                ALLERGIES  Allergies   Allergen Reactions   • Codeine Nausea       PHYSICAL EXAM  VITAL SIGNS: /61   Pulse 71   Temp 36.1 °C (97 °F) (Temporal)   Resp 16   Ht 1.626 m (5' 4\")   Wt 54 kg (119 lb 0.8 oz)   SpO2 96%   BMI 20.43 kg/m²     Vital signs reviewed.  Constitutional:  Elderly female, laying in bed.   Head: laceration to occipital region.  1.5 cm.  Well approximated.  Mouth/Throat: Oropharynx is moist  Neck: Supple.  Nontender  Cardiovascular: Regular rate and rhythm.   Pulmonary/Chest: CTA.   Abdominal: Soft.  Nontender  Musculoskeletal: Tenderness over the left hip.  Tenderness with left internal and external rotation  Lymphadenopathy: No lymphatic  Neurological: Normal strength and sensation  Skin: 2 cm laceration to the left occipital ridge. Abrasion over left ear and lateral left elbow with good range of motion.   Psychiatric: A and O x3.  Pleasant.  Obviously uncomfortable    LABS  Results for orders placed or performed during the hospital encounter of 11/18/18   CBC w/ Differential   Result Value Ref Range    WBC 10.9 (H) 4.8 - 10.8 K/uL    RBC 4.94 4.20 - 5.40 M/uL    Hemoglobin 14.8 12.0 - 16.0 g/dL    Hematocrit 43.1 37.0 - 47.0 %    MCV 87.2 81.4 - 97.8 fL    MCH 30.0 27.0 - 33.0 pg    MCHC 34.3 33.6 - 35.0 g/dL    RDW 44.4 35.9 - 50.0 fL    Platelet Count 249 164 - 446 K/uL    MPV 9.4 9.0 - 12.9 fL    Neutrophils-Polys 57.40 44.00 - 72.00 %    Lymphocytes 34.40 22.00 - 41.00 %    " Monocytes 5.10 0.00 - 13.40 %    Eosinophils 1.50 0.00 - 6.90 %    Basophils 0.40 0.00 - 1.80 %    Immature Granulocytes 1.20 (H) 0.00 - 0.90 %    Nucleated RBC 0.00 /100 WBC    Neutrophils (Absolute) 6.24 2.00 - 7.15 K/uL    Lymphs (Absolute) 3.73 1.00 - 4.80 K/uL    Monos (Absolute) 0.55 0.00 - 0.85 K/uL    Eos (Absolute) 0.16 0.00 - 0.51 K/uL    Baso (Absolute) 0.04 0.00 - 0.12 K/uL    Immature Granulocytes (abs) 0.13 (H) 0.00 - 0.11 K/uL    NRBC (Absolute) 0.00 K/uL   Complete Metabolic Panel (CMP)   Result Value Ref Range    Sodium 137 135 - 145 mmol/L    Potassium 3.8 3.6 - 5.5 mmol/L    Chloride 105 96 - 112 mmol/L    Co2 26 20 - 33 mmol/L    Anion Gap 6.0 0.0 - 11.9    Glucose 159 (H) 65 - 99 mg/dL    Bun 13 8 - 22 mg/dL    Creatinine 1.03 0.50 - 1.40 mg/dL    Calcium 8.7 8.5 - 10.5 mg/dL    AST(SGOT) 13 12 - 45 U/L    ALT(SGPT) 8 2 - 50 U/L    Alkaline Phosphatase 98 30 - 99 U/L    Total Bilirubin 0.5 0.1 - 1.5 mg/dL    Albumin 3.6 3.2 - 4.9 g/dL    Total Protein 6.2 6.0 - 8.2 g/dL    Globulin 2.6 1.9 - 3.5 g/dL    A-G Ratio 1.4 g/dL   Troponin   Result Value Ref Range    Troponin I 0.02 0.00 - 0.04 ng/mL   APTT   Result Value Ref Range    APTT 25.5 24.7 - 36.0 sec   Urinalysis, culture if indicated   Result Value Ref Range    Color Yellow     Character Cloudy (A)     Specific Gravity 1.014 <1.035    Ph 6.0 5.0 - 8.0    Glucose Negative Negative mg/dL    Ketones Negative Negative mg/dL    Protein 30 (A) Negative mg/dL    Bilirubin Negative Negative    Urobilinogen, Urine 0.2 Negative    Nitrite Negative Negative    Leukocyte Esterase Large (A) Negative    Occult Blood Moderate (A) Negative    Micro Urine Req Microscopic    Prothrombin (PT/INR)   Result Value Ref Range    PT 13.5 12.0 - 14.6 sec    INR 1.02 0.87 - 1.13   ESTIMATED GFR   Result Value Ref Range    GFR If African American 60 >60 mL/min/1.73 m 2    GFR If Non African American 49 (A) >60 mL/min/1.73 m 2   URINE MICROSCOPIC (W/UA)   Result Value  Ref Range    WBC  (A) /hpf    RBC 5-10 (A) /hpf    Bacteria Moderate (A) None /hpf    Epithelial Cells Rare /hpf   EKG (NOW)   Result Value Ref Range    Report       [facility] Emergency Dept.    Test Date:  2018  Pt Name:    MANI CHAVEZ                Department: ER  MRN:        [id number]                      Room:       [facility]  Gender:     Female                       Technician: [id number]  :        [id number]                   Requested By:FLAVIO GONCALVES  Order #:    [id number]                    Reading MD:    Measurements  Intervals                                Axis  Rate:       67                           P:          80  WI:         200                          QRS:        -59  QRSD:       124                          T:          89  QT:         456  QTc:        482    Interpretive Statements  SINUS RHYTHM  CONSIDER LEFT ATRIAL ABNORMALITY  LEFT BUNDLE BRANCH BLOCK  Compared to ECG 09/10/2014 12:02:26  Left bundle-branch block now present  Ventricular premature complex(es) no longer present  Left anterior fascicular block no longer present  T-wave abnormality no longer present  Possible ischemia no longer present         All labs reviewed by me.    EKG  12 Lead EKG interpreted by me to show sinus rhythm at 70. Normal P waves. Abnormal QRS consistent with left bundle branch block. Normal ST segments. Normal T waves. Abnormal EKG     RADIOLOGY  CT-HEAD W/O   Final Result      Normal CT scan of the head without contrast.               INTERPRETING LOCATION:  [address] [facility], [place], [address]      CT-CSPINE WITHOUT PLUS RECONS   Final Result      No acute fracture or traumatic subluxation.      DX-HIP-UNILATERAL-WITH PELVIS-1 VIEW LEFT   Final Result      Left femoral neck fracture.      DX-PELVIS-1 OR 2 VIEWS    (Results Pending)   CT-HIP W/O PLUS RECONS LEFT    (Results Pending)   DX-HIP-UNILATERAL-W/O PELVIS-2/3 VIEWS LEFT    (Results Pending)   DX-PORTABLE FLUORO > 1 HOUR     (Results Pending)     The radiologist's interpretation of all radiological studies have been reviewed by me.    COURSE & MEDICAL DECISION MAKING  Pertinent Labs & Imaging studies reviewed. (See chart for details) The patient's [facility] and past medical records were reviewed    6:54 AM - Patient seen and examined at bedside. Patient will be treated with fentanyl 50 mcg. Ordered CT head, CT C spine, DX left hip, CBC with differential, CMP, troponin, APTT, urinalysis, PT/INR, estimated GFR and and EKG to evaluate her symptoms. The differential diagnoses include but are not limited to: Fracture versus dislocation    8:07 AM I discussed the patient's case and the above findings with Dr. Middleton (orthopedics) who will evaluate the patient here in the ED.     8:10 AM - I discussed the patient's case and the above findings with Dr. Lieberman (hospitalist) who will accept the patient for admission    DISPOSITION:  Patient will be admitted to Dr. Lieberman in guarded condition.      FINAL IMPRESSION  1. Closed left hip fracture, initial encounter ([facility])    2. Contusion of scalp, initial encounter    3. Skin tear of left forearm without complication, subsequent encounter    4. Contusion of ear (auricle), left, initial encounter          ITemitope (Scribe), am scribing for, and in the presence of, Brandon Beatty M.D..    Electronically signed by: Temitope Espinosa (Scribe), 11/18/2018    IBrandon M.D. personally performed the services described in this documentation, as scribed by Temitope Espinosa in my presence, and it is both accurate and complete.    C    The note accurately reflects work and decisions made by me.  Brandon Beatty  11/18/2018  10:08 AM

## 2018-11-18 NOTE — PROGRESS NOTES
Pt arrived to floor from PACU, awake, alert, oriented. Left hip island drsg CDI, albarran in place, daughter at bedside.

## 2018-11-18 NOTE — PROGRESS NOTES
Patient to pacu, connected to bedside monitor. Handoff report given to Anne JACKSON and care relinquished.

## 2018-11-19 ENCOUNTER — APPOINTMENT (OUTPATIENT)
Dept: RADIOLOGY | Facility: MEDICAL CENTER | Age: 83
DRG: 481 | End: 2018-11-19
Attending: ORTHOPAEDIC SURGERY
Payer: MEDICARE

## 2018-11-19 PROBLEM — R73.9 HYPERGLYCEMIA: Status: ACTIVE | Noted: 2018-11-19

## 2018-11-19 PROBLEM — S72.142A CLOSED INTERTROCHANTERIC FRACTURE OF LEFT FEMUR (HCC): Chronic | Status: ACTIVE | Noted: 2018-11-18

## 2018-11-19 PROBLEM — N39.0 UTI (URINARY TRACT INFECTION): Status: ACTIVE | Noted: 2018-11-19

## 2018-11-19 LAB
ANION GAP SERPL CALC-SCNC: 6 MMOL/L (ref 0–11.9)
BASOPHILS # BLD AUTO: 0.3 % (ref 0–1.8)
BASOPHILS # BLD: 0.05 K/UL (ref 0–0.12)
BUN SERPL-MCNC: 16 MG/DL (ref 8–22)
CALCIUM SERPL-MCNC: 9 MG/DL (ref 8.5–10.5)
CHLORIDE SERPL-SCNC: 105 MMOL/L (ref 96–112)
CO2 SERPL-SCNC: 30 MMOL/L (ref 20–33)
CREAT SERPL-MCNC: 0.97 MG/DL (ref 0.5–1.4)
EOSINOPHIL # BLD AUTO: 0.15 K/UL (ref 0–0.51)
EOSINOPHIL NFR BLD: 0.9 % (ref 0–6.9)
ERYTHROCYTE [DISTWIDTH] IN BLOOD BY AUTOMATED COUNT: 45.9 FL (ref 35.9–50)
EST. AVERAGE GLUCOSE BLD GHB EST-MCNC: 154 MG/DL
GLUCOSE SERPL-MCNC: 155 MG/DL (ref 65–99)
HBA1C MFR BLD: 7 % (ref 0–5.6)
HCT VFR BLD AUTO: 38.5 % (ref 37–47)
HGB BLD-MCNC: 12.9 G/DL (ref 12–16)
IMM GRANULOCYTES # BLD AUTO: 0.08 K/UL (ref 0–0.11)
IMM GRANULOCYTES NFR BLD AUTO: 0.5 % (ref 0–0.9)
LYMPHOCYTES # BLD AUTO: 2.39 K/UL (ref 1–4.8)
LYMPHOCYTES NFR BLD: 15.1 % (ref 22–41)
MAGNESIUM SERPL-MCNC: 2 MG/DL (ref 1.5–2.5)
MCH RBC QN AUTO: 30.1 PG (ref 27–33)
MCHC RBC AUTO-ENTMCNC: 33.5 G/DL (ref 33.6–35)
MCV RBC AUTO: 89.7 FL (ref 81.4–97.8)
MONOCYTES # BLD AUTO: 0.97 K/UL (ref 0–0.85)
MONOCYTES NFR BLD AUTO: 6.1 % (ref 0–13.4)
NEUTROPHILS # BLD AUTO: 12.18 K/UL (ref 2–7.15)
NEUTROPHILS NFR BLD: 77.1 % (ref 44–72)
NRBC # BLD AUTO: 0 K/UL
NRBC BLD-RTO: 0 /100 WBC
PLATELET # BLD AUTO: 248 K/UL (ref 164–446)
PMV BLD AUTO: 9.8 FL (ref 9–12.9)
POTASSIUM SERPL-SCNC: 3.6 MMOL/L (ref 3.6–5.5)
RBC # BLD AUTO: 4.29 M/UL (ref 4.2–5.4)
SODIUM SERPL-SCNC: 141 MMOL/L (ref 135–145)
WBC # BLD AUTO: 15.8 K/UL (ref 4.8–10.8)

## 2018-11-19 PROCEDURE — 36415 COLL VENOUS BLD VENIPUNCTURE: CPT

## 2018-11-19 PROCEDURE — G8978 MOBILITY CURRENT STATUS: HCPCS | Mod: CK

## 2018-11-19 PROCEDURE — 700102 HCHG RX REV CODE 250 W/ 637 OVERRIDE(OP): Performed by: INTERNAL MEDICINE

## 2018-11-19 PROCEDURE — 73502 X-RAY EXAM HIP UNI 2-3 VIEWS: CPT | Mod: LT

## 2018-11-19 PROCEDURE — 99232 SBSQ HOSP IP/OBS MODERATE 35: CPT | Performed by: HOSPITALIST

## 2018-11-19 PROCEDURE — 700111 HCHG RX REV CODE 636 W/ 250 OVERRIDE (IP): Performed by: ORTHOPAEDIC SURGERY

## 2018-11-19 PROCEDURE — 770001 HCHG ROOM/CARE - MED/SURG/GYN PRIV*

## 2018-11-19 PROCEDURE — G8979 MOBILITY GOAL STATUS: HCPCS | Mod: CJ

## 2018-11-19 PROCEDURE — 700111 HCHG RX REV CODE 636 W/ 250 OVERRIDE (IP): Performed by: INTERNAL MEDICINE

## 2018-11-19 PROCEDURE — A9270 NON-COVERED ITEM OR SERVICE: HCPCS | Performed by: ORTHOPAEDIC SURGERY

## 2018-11-19 PROCEDURE — 700111 HCHG RX REV CODE 636 W/ 250 OVERRIDE (IP): Performed by: NURSE PRACTITIONER

## 2018-11-19 PROCEDURE — G8988 SELF CARE GOAL STATUS: HCPCS | Mod: CJ

## 2018-11-19 PROCEDURE — A9270 NON-COVERED ITEM OR SERVICE: HCPCS | Performed by: INTERNAL MEDICINE

## 2018-11-19 PROCEDURE — 83036 HEMOGLOBIN GLYCOSYLATED A1C: CPT

## 2018-11-19 PROCEDURE — 83735 ASSAY OF MAGNESIUM: CPT

## 2018-11-19 PROCEDURE — 97165 OT EVAL LOW COMPLEX 30 MIN: CPT

## 2018-11-19 PROCEDURE — 700105 HCHG RX REV CODE 258: Performed by: NURSE PRACTITIONER

## 2018-11-19 PROCEDURE — 700105 HCHG RX REV CODE 258: Performed by: INTERNAL MEDICINE

## 2018-11-19 PROCEDURE — 85025 COMPLETE CBC W/AUTO DIFF WBC: CPT

## 2018-11-19 PROCEDURE — G8987 SELF CARE CURRENT STATUS: HCPCS | Mod: CK

## 2018-11-19 PROCEDURE — 700102 HCHG RX REV CODE 250 W/ 637 OVERRIDE(OP): Performed by: ORTHOPAEDIC SURGERY

## 2018-11-19 PROCEDURE — 97162 PT EVAL MOD COMPLEX 30 MIN: CPT

## 2018-11-19 PROCEDURE — 80048 BASIC METABOLIC PNL TOTAL CA: CPT

## 2018-11-19 PROCEDURE — 770006 HCHG ROOM/CARE - MED/SURG/GYN SEMI*

## 2018-11-19 PROCEDURE — 51798 US URINE CAPACITY MEASURE: CPT

## 2018-11-19 RX ADMIN — OXYCODONE HYDROCHLORIDE 5 MG: 5 TABLET ORAL at 17:26

## 2018-11-19 RX ADMIN — STANDARDIZED SENNA CONCENTRATE AND DOCUSATE SODIUM 2 TABLET: 8.6; 5 TABLET, FILM COATED ORAL at 17:26

## 2018-11-19 RX ADMIN — SODIUM CHLORIDE: 9 INJECTION, SOLUTION INTRAVENOUS at 03:57

## 2018-11-19 RX ADMIN — ASPIRIN 325 MG: 325 TABLET, COATED ORAL at 17:26

## 2018-11-19 RX ADMIN — LABETALOL HYDROCHLORIDE 10 MG: 5 INJECTION, SOLUTION INTRAVENOUS at 05:06

## 2018-11-19 RX ADMIN — OXYCODONE HYDROCHLORIDE 5 MG: 5 TABLET ORAL at 00:31

## 2018-11-19 RX ADMIN — SODIUM CHLORIDE: 9 INJECTION, SOLUTION INTRAVENOUS at 16:30

## 2018-11-19 RX ADMIN — STANDARDIZED SENNA CONCENTRATE AND DOCUSATE SODIUM 2 TABLET: 8.6; 5 TABLET, FILM COATED ORAL at 05:06

## 2018-11-19 RX ADMIN — LISINOPRIL 60 MG: 20 TABLET ORAL at 05:07

## 2018-11-19 RX ADMIN — ASPIRIN 325 MG: 325 TABLET, COATED ORAL at 05:06

## 2018-11-19 RX ADMIN — SERTRALINE 200 MG: 100 TABLET, FILM COATED ORAL at 05:07

## 2018-11-19 RX ADMIN — CEFTRIAXONE SODIUM 2 G: 2 INJECTION, POWDER, FOR SOLUTION INTRAMUSCULAR; INTRAVENOUS at 17:29

## 2018-11-19 RX ADMIN — AMLODIPINE BESYLATE 5 MG: 5 TABLET ORAL at 05:07

## 2018-11-19 RX ADMIN — AMLODIPINE BESYLATE 5 MG: 5 TABLET ORAL at 17:26

## 2018-11-19 RX ADMIN — PREDNISONE 5 MG: 5 TABLET ORAL at 07:56

## 2018-11-19 RX ADMIN — CEFAZOLIN SODIUM 1 G: 1 INJECTION, SOLUTION INTRAVENOUS at 02:08

## 2018-11-19 RX ADMIN — ALBUTEROL SULFATE 2 PUFF: 90 AEROSOL, METERED RESPIRATORY (INHALATION) at 07:52

## 2018-11-19 ASSESSMENT — GAIT ASSESSMENTS
ASSISTIVE DEVICE: FRONT WHEEL WALKER
DISTANCE (FEET): 2
GAIT LEVEL OF ASSIST: MODERATE ASSIST

## 2018-11-19 ASSESSMENT — COGNITIVE AND FUNCTIONAL STATUS - GENERAL
HELP NEEDED FOR BATHING: A LOT
STANDING UP FROM CHAIR USING ARMS: TOTAL
MOBILITY SCORE: 10
TURNING FROM BACK TO SIDE WHILE IN FLAT BAD: A LOT
TOILETING: TOTAL
WALKING IN HOSPITAL ROOM: TOTAL
SUGGESTED CMS G CODE MODIFIER MOBILITY: CL
SUGGESTED CMS G CODE MODIFIER DAILY ACTIVITY: CK
MOVING TO AND FROM BED TO CHAIR: A LOT
MOVING FROM LYING ON BACK TO SITTING ON SIDE OF FLAT BED: A LITTLE
PERSONAL GROOMING: A LITTLE
CLIMB 3 TO 5 STEPS WITH RAILING: TOTAL
DAILY ACTIVITIY SCORE: 14
DRESSING REGULAR LOWER BODY CLOTHING: TOTAL
DRESSING REGULAR UPPER BODY CLOTHING: A LITTLE

## 2018-11-19 ASSESSMENT — ENCOUNTER SYMPTOMS
WEAKNESS: 1
PALPITATIONS: 0
COUGH: 0
CONSTIPATION: 0
SENSORY CHANGE: 0
NAUSEA: 0
MYALGIAS: 1
VOMITING: 0
DIARRHEA: 0
CHILLS: 0
FEVER: 0
HEADACHES: 0
BLURRED VISION: 0
SHORTNESS OF BREATH: 0
ABDOMINAL PAIN: 0
DIZZINESS: 0
TINGLING: 0
SPEECH CHANGE: 0
DOUBLE VISION: 0
TREMORS: 0

## 2018-11-19 ASSESSMENT — PAIN SCALES - GENERAL
PAINLEVEL_OUTOF10: ASSUMED PAIN PRESENT
PAINLEVEL_OUTOF10: 6
PAINLEVEL_OUTOF10: 8

## 2018-11-19 ASSESSMENT — ACTIVITIES OF DAILY LIVING (ADL): TOILETING: INDEPENDENT

## 2018-11-19 NOTE — THERAPY
"Physical Therapy Evaluation completed.   Bed Mobility:  Supine to Sit: Moderate Assist (x2)  Transfers: Sit to Stand: Minimal Assist  Gait: Level Of Assist: Moderate Assist with Front-Wheel Walker       Plan of Care: Will benefit from Physical Therapy 4 times per week  Discharge Recommendations: Equipment: Will Continue to Assess for Equipment Needs. Recommend inpatient transitional care services for continued physical therapy services. Please consider physiatry (PM&R) consult.     See \"Rehab Therapy-Acute\" Patient Summary Report for complete documentation.     Pt was recently admitted for a GLF presented with L hip fracture and is now s/p L ORIF of the hip with WBAT precautions in place. Pt presented with impaired balance, impaired gait, pain, and dec activity tolerance. Pt was able to demonstrate Min to Mod A for all functional mobility at this time w/FWW use. Pt demonstrated with poor WB tolerance on LLE with poor advancement of RLE during side steps. Pt was able to scoot LLE during side steps, however, did not demonstrate any foot clearence of RLE. Pt was primarily limited due to pain. Pt will benefit from skilled PT while in house, with recommendations for post acute therapy given current objective findings, age, IPLOF, and limited social support at home. Pt is highly motivated and would possible benefit from acute care rehab as the patient had an IPLOF and is anticipated to tolerate 3 hours of intensive therapy. Will continue to follow.   "

## 2018-11-19 NOTE — PROGRESS NOTES
"   Orthopaedic PA Progress Note    Interval changes:did well overnight. Requesting albuterol.     ROS - Patient denies any new issues. No chest pain, dyspnea, or fever.  Pain well controlled.    Blood pressure 147/67, pulse 68, temperature 36.4 °C (97.6 °F), temperature source Temporal, resp. rate 16, height 1.626 m (5' 4\"), weight 54 kg (119 lb 0.8 oz), SpO2 96 %, not currently breastfeeding.    Patient seen and examined  No acute distress  Breathing non labored  RRR  Surgical dressing is clean, dry, and intact. Patient clearly fires tibialis anterior, EHL, and gastrocnemius/soleus. Sensation is intact to light touch throughout superficial peroneal, deep peroneal, tibial, saphenous, and sural nerve distributions. Strong and palpable 2+ dorsalis pedis and posterior tibial pulses with capillary refill less than 2 seconds. No lower leg tenderness or discomfort.    Recent Labs      11/18/18   0600  11/19/18   0345   WBC  10.9*  15.8*   RBC  4.94  4.29   HEMOGLOBIN  14.8  12.9   HEMATOCRIT  43.1  38.5   MCV  87.2  89.7   MCH  30.0  30.1   MCHC  34.3  33.5*   RDW  44.4  45.9   PLATELETCT  249  248   MPV  9.4  9.8       Active Hospital Problems    Diagnosis   • Closed intertrochanteric fracture of left femur ([facility]) [S72.142A]     Priority: High   • Fall [W19.XXXA]   • Chronic kidney disease, stage III (moderate) ([facility]) [N18.3]   • Asthma, moderate persistent, poorly-controlled [J45.40]   • HTN (hypertension) [I10]   • Depression [F32.9]       Assessment/Plan:  POD#1 S/P L Hip nail  Wt bearing status - AT  PT/OT-initiated  Wound care:dressing change tomorrow  Drains - no  Avery-no  Sutures/Staples out- 10-14 days post operatively  Antibiotics: complete  DVT Prophylaxis- TEDS/SCDs/Foot pumps.    mg PO BID30  Future Procedures - not anticipated  Case Coordination for Discharge Planning - Disposition per Med/PT/OT. Doing well.      "

## 2018-11-19 NOTE — ASSESSMENT & PLAN NOTE
SBP 140s today. Was as high as 188 overnight.  Continue home amlodipine and lisinopril.  Continue to monitor closely.

## 2018-11-19 NOTE — H&P
Hospital Medicine History & Physical Note    Date of Service  11/18/2018    Primary Care Physician  Roberto Cota M.D.    Consultants  Orthopedic surgery    Code Status  DNR/DNI    Chief Complaint  Ground-level fall    History of Presenting Illness  89 y.o. female who presented 11/18/2018 with past medical history of hypertension, asthma on chronic steroid and home oxygen presented to the hospital with complaint of ground-level fall.  Patient expressed that she woke up this morning while walking towards the bathroom and she had a ground-level fall.  She expressed that she hit her head during that fall.  She denies losing consciousness or any syncopal episode.  Denies using alcohol or any illegal drugs.  She reported that she has severe pain on her left hip, it severe in intensity, nonradiating, increased with movement and improved with pain medications.  She denies any other symptoms at the time of evaluation.  She takes prednisone 5 mg daily for very long time.  She decided to be DNR and DNI on admission.  Patient's daughter at the bedside.      ED course: She found to have acute left intertrochanteric femoral fracture with impaction.  Orthopedic surgery was consulted and she went to the OR and underwent left hip open reduction and internal fixation.  I evaluated her post surgery.          Review of Systems  Review of Systems   Constitutional: Negative for chills and fever.   HENT: Negative for hearing loss and tinnitus.    Eyes: Negative for blurred vision and double vision.   Respiratory: Negative for cough, hemoptysis, sputum production and shortness of breath.    Cardiovascular: Negative for chest pain, palpitations, orthopnea and leg swelling.   Gastrointestinal: Negative for abdominal pain, constipation, diarrhea, heartburn, nausea and vomiting.   Genitourinary: Negative for dysuria, frequency and urgency.   Musculoskeletal: Positive for falls, joint pain and myalgias.   Skin: Negative for rash.    Neurological: Negative for dizziness, sensory change, speech change, focal weakness, seizures and headaches.   Psychiatric/Behavioral: Negative for hallucinations.       Past Medical History   has a past medical history of Arthritis; ASTHMA; Depression; Hypertension; Hypoxia; and Insomnia.    Surgical History   has a past surgical history that includes hysterectomy, total abdominal; appendectomy; and tonsillectomy.     Family History  family history includes Asthma in her father; Diabetes in her mother and sister.     Social History   reports that she quit smoking about 53 years ago. Her smoking use included Cigarettes. She has a 5.00 pack-year smoking history. She quit smokeless tobacco use about 53 years ago. She reports that she drinks about 8.4 oz of alcohol per week . She reports that she does not use drugs.    Allergies  Allergies   Allergen Reactions   • Codeine Nausea       Medications  Prior to Admission Medications   Prescriptions Last Dose Informant Patient Reported? Taking?   albuterol 108 (90 Base) MCG/ACT Aero Soln inhalation aerosol 11/17/2018 at 2000 Patient Yes Yes   Sig: Inhale 2 Puffs by mouth every 6 hours as needed for Shortness of Breath.   amlodipine (NORVASC) 5 MG Tab 11/17/2018 at 1900 Patient No No   Sig: Take 1 Tab by mouth 2 Times a Day.   lisinopril (PRINIVIL, ZESTRIL) 40 MG tablet 11/17/2018 at 0800 Patient No No   Sig: Take 1.5 Tabs by mouth every morning.   melatonin 3 MG Tab 11/17/2018 at 1900 Patient Yes Yes   Sig: Take 3 mg by mouth every bedtime.   predniSONE (DELTASONE) 5 MG Tab 11/17/2018 at 0800 Patient No No   Sig: Take 1 Tab by mouth every morning with breakfast.   sertraline (ZOLOFT) 100 MG Tab 11/17/2018 at 0800 Patient No No   Sig: Take 2 Tabs by mouth every day.      Facility-Administered Medications: None       Physical Exam  Temp:  [36 °C (96.8 °F)-36.8 °C (98.3 °F)] 36.8 °C (98.3 °F)  Pulse:  [56-94] 78  Resp:  [13-18] 18  BP: (150-175)/(52-80) 169/80    Physical  Exam   Constitutional: She is oriented to person, place, and time. No distress.   HENT:   Head: Normocephalic.   Eyes: Pupils are equal, round, and reactive to light.   Neck: Normal range of motion.   Cardiovascular: Normal rate and regular rhythm.  Exam reveals no friction rub.    No murmur heard.  Pulmonary/Chest: Effort normal and breath sounds normal. No respiratory distress. She has no wheezes.   Abdominal: Soft. Bowel sounds are normal. She exhibits no distension. There is no tenderness. There is no rebound.   Musculoskeletal: She exhibits tenderness (Left hip). She exhibits no edema.   Dressing present on left lateral hip   Neurological: She is alert and oriented to person, place, and time. No cranial nerve deficit.   Skin: Skin is warm and dry. She is not diaphoretic. No erythema.       Laboratory:  Recent Labs      11/18/18   0600   WBC  10.9*   RBC  4.94   HEMOGLOBIN  14.8   HEMATOCRIT  43.1   MCV  87.2   MCH  30.0   MCHC  34.3   RDW  44.4   PLATELETCT  249   MPV  9.4     Recent Labs      11/18/18   0600   SODIUM  137   POTASSIUM  3.8   CHLORIDE  105   CO2  26   GLUCOSE  159*   BUN  13   CREATININE  1.03   CALCIUM  8.7     Recent Labs      11/18/18   0600   ALTSGPT  8   ASTSGOT  13   ALKPHOSPHAT  98   TBILIRUBIN  0.5   GLUCOSE  159*     Recent Labs      11/18/18   0600   APTT  25.5   INR  1.02             Recent Labs      11/18/18   0600   TROPONINI  0.02       Urinalysis:    Recent Labs      11/18/18   0800   SPECGRAVITY  1.014   GLUCOSEUR  Negative   KETONES  Negative   NITRITE  Negative   LEUKESTERAS  Large*   WBCURINE  *   RBCURINE  5-10*   BACTERIA  Moderate*   EPITHELCELL  Rare        Imaging:  CT-HIP W/O PLUS RECONS LEFT   Final Result      Acute left intertrochanteric femoral fracture with impaction, mild comminution      Moderate left femoral acetabular cartilage thinning      Severe colonic diverticulosis and atherosclerosis      CT-HEAD W/O   Final Result      Normal CT scan of the head  without contrast.               INTERPRETING LOCATION:  [address] [place] [address] [place], [address]      CT-CSPINE WITHOUT PLUS RECONS   Final Result      No acute fracture or traumatic subluxation.      DX-HIP-UNILATERAL-WITH PELVIS-1 VIEW LEFT   Final Result      Left femoral neck fracture.      DX-HIP-UNILATERAL-W/O PELVIS-2/3 VIEWS LEFT    (Results Pending)   DX-PORTABLE FLUORO > 1 HOUR    (Results Pending)         Assessment/Plan:  I anticipate this patient will require at least two midnights for appropriate medical management, necessitating inpatient admission.    Closed intertrochanteric fracture of left femur (HCC)   Assessment & Plan    She found to have acute left intertrochanteric femoral fracture.  She underwent left hip open reduction internal fixation.  Continue to provide her pain control  We will start her on DVT prophylaxis from tomorrow.     Fall   Assessment & Plan    She had a ground-level fall and she denies any loss of consciousness and syncope.  She underwent head CT scan and cervical spine CT scan which did not show any significant acute abnormalities.  Physical therapy and occupational therapy     Chronic kidney disease, stage III (moderate) (HCC)- (present on admission)   Assessment & Plan    History of chronic kidney disease.  We will avoid nephrotoxin and use medications as per renal function.     Depression- (present on admission)   Assessment & Plan    Continue home medication     HTN (hypertension)- (present on admission)   Assessment & Plan    Continue her home blood pressure medications.     Asthma, moderate persistent, poorly-controlled- (present on admission)   Assessment & Plan    Has been taking steroids chronically.    Continue steroid and avoid adrenal crisis due to chronic use.         VTE prophylaxis: SCDs for now will start chemical DVT prophylaxis from tomorrow as recommended by orthopedic surgery.

## 2018-11-19 NOTE — ASSESSMENT & PLAN NOTE
GFR > 60, creat WNL.  Avoid nephrotoxins & renally dose appropriate medications.  Continue to monitor.

## 2018-11-19 NOTE — PROGRESS NOTES
· 2 RN skin check complete.   · Devices in place SCDs, nasal canula.  · Skin assessed under devices Yes, no skin breakdown noted under devices.  · The following interventions in place: Patient turns self side to side, skin checked under devices Q shift, moisturizer available.    Skin tear noted on left elbow. No pressure related injuries noted. Several small abrasions noted to face, and small bump with dried blood to back of head. Island dressing to left hip, clean dry and intact.

## 2018-11-19 NOTE — CARE PLAN
Problem: Communication  Goal: The ability to communicate needs accurately and effectively will improve    Intervention: [place] patient and significant other/support system to call light to alert staff of needs  Call light within reach of patient, and patient has been educated to call staff for any needs and before attempting to exit bed.       Problem: Mobility  Goal: Risk for activity intolerance will decrease    Intervention: Encourage patient to increase activity level in collaboration with Interdisciplinary Team  Educated patient that she should get up out of bed and ambulate this evening to assist with the recovery process. Patient adamantly refused due to pain. However, patient did agree to attempt the bedside commode later in shift when she needs to urinate post albarran removal, and to work with physical therapy in morning. Will continue to educate patient on importance of early mobility.

## 2018-11-19 NOTE — ASSESSMENT & PLAN NOTE
Detected on 11/18/18. Continue IV ceftriaxone.  Currently denies s/s.  Culture positive for strep viridans.  Sensitivities still pending.

## 2018-11-19 NOTE — RESPIRATORY CARE
COPD EDUCATION by COPD CLINICAL EDUCATOR  11/19/2018 at 6:40 AM by Anika Mane     Patient reviewed by COPD education team. Patient does not qualify for COPD program.

## 2018-11-19 NOTE — THERAPY
"Occupational Therapy Evaluation completed.   Functional Status: Mod A of 2 supine > < EOB, min A of 2 sit to stands, total A LB dressing  Plan of Care: Will benefit from Occupational Therapy 4 times per week  Discharge Recommendations:  Equipment: Will Continue to Assess for Equipment Needs. Post-acute therapy: Recommend inpatient transitional care services for continued occupational therapy services. Please consider physiatry (PM&R) consult.     See \"Rehab Therapy-Acute\" Patient Summary Report for complete documentation.    89 y.o. female with h/o HTN, asthma, s/p GLF with subsequent L IT fx. Pt underwent cephalomedullary nail, now WBAT. Seen for OT eval. Pt received in bed, on bedpan. Assisted to EOB and sitting. Pt able to sit unsupported. Completed sit to stands at FWW (min A of 2), standing weight shifts. Pt unable to clear either foot to take steps. Able to \"shimmy\" alternating feet using FWW to move about 1' towards HOB on L. Pt returned to supine. Pt is limited by: balance impairment, pain, limited activity tolerance, generalized weakness. She appears highly motivated with good family support. Acute OT to continue following with recommendation for post-acute transitional care setting. Pt would benefit from physiatry consult.     "

## 2018-11-19 NOTE — PROGRESS NOTES
"Progress Note     Interval changes:improved. Left hip pain feeling better     ROS - Patient denies any new issues. No chest pain, dyspnea, or fever.  Pain well controlled.   BP (!) 182/72   Pulse 76   Temp 36.9 °C (98.5 °F) (Temporal)   Resp 16   Ht 1.626 m (5' 4\")   Wt 54 kg (119 lb 0.8 oz)   SpO2 95%   Breastfeeding? No   BMI 20.43 kg/m²      Patient seen and examined  No acute distress  Breathing non labored  RRR  Left hip incision clean and dry. No erythema or drainage noted.    +EHL/FHL/TA  SILT m/l/1st dws  wwp foot  Recent Labs      11/18/18   0600  11/19/18   0345   WBC  10.9*  15.8*   RBC  4.94  4.29   HEMOGLOBIN  14.8  12.9   HEMATOCRIT  43.1  38.5   MCV  87.2  89.7   MCH  30.0  30.1   RDW  44.4  45.9   PLATELETCT  249  248   MPV  9.4  9.8   NEUTSPOLYS  57.40  77.10*   LYMPHOCYTES  34.40  15.10*   MONOCYTES  5.10  6.10   EOSINOPHILS  1.50  0.90   BASOPHILS  0.40  0.30     Recent Labs      11/18/18   0600  11/19/18   0345   SODIUM  137  141   POTASSIUM  3.8  3.6   CHLORIDE  105  105   CO2  26  30   GLUCOSE  159*  155*   BUN  13  16        A/P: 89yo F with left hip basicervical fracture  Protected weight bearing on left hip  Pain control  PT/OT  ASA for DVT prophylaxis  Xrays today of the left hip         Patient Active Problem List   Diagnosis   • Asthma, moderate persistent, poorly-controlled   • COPD (chronic obstructive pulmonary disease) ([facility])   • HTN (hypertension)   • Vitamin D insufficiency   • Depression   • Fatigue   • Hyperlipidemia   • Grief reaction   • Insomnia   • Nocturnal hypoxia   • Urinary frequency   • Constipation   • At risk for falls   • Recurrent UTI   • Nausea   • Chronic kidney disease, stage III (moderate) ([facility])   • Urinary retention   • Closed intertrochanteric fracture of left femur ([facility])   • Fall     "

## 2018-11-19 NOTE — ASSESSMENT & PLAN NOTE
Has been on chronic steroids.  Not in acute exacerbation. Lungs clear. On her home level of O2 at 2 LPM.  RT protocol in place.

## 2018-11-19 NOTE — ASSESSMENT & PLAN NOTE
S/p left hip cephalomedullary nailing on 11/18.  Continue to work with PT/OT.  Continue pain control.  Orthopedic surgery following.

## 2018-11-19 NOTE — PROGRESS NOTES
[facility] Hospitalist Progress Note    Date of Service: 2018    Chief Complaint  89 y.o. Female with h/o hypertension, asthma admitted 2018 with left hip fracture s/p repair on .    Interval Problem Update  :   Pain currently controlled.  Very weak.  Pending PT/OT.  Denies numbness or tingling of LLE.  VSS.    Consultants/Specialty  Ortho surgery     Disposition  Anticipate SNF at d/c.  SW following.        Review of Systems   Constitutional: Negative for chills and fever.   HENT: Negative for congestion.    Eyes: Negative for blurred vision and double vision.   Respiratory: Negative for cough and shortness of breath.    Cardiovascular: Negative for chest pain, palpitations and leg swelling.   Gastrointestinal: Negative for abdominal pain, constipation, diarrhea, nausea and vomiting.   Genitourinary: Negative for dysuria.   Musculoskeletal: Positive for joint pain and myalgias.   Skin: Negative for itching and rash.   Neurological: Positive for weakness. Negative for dizziness, tingling, tremors, sensory change, speech change and headaches.      Physical Exam  Laboratory/Imaging   Hemodynamics  Temp (24hrs), Av.7 °C (98.1 °F), Min:36.4 °C (97.5 °F), Max:37.1 °C (98.8 °F)   Temperature: 36.4 °C (97.6 °F)  Pulse  Av.8  Min: 56  Max: 94    Blood Pressure : 147/67      Respiratory      Respiration: 16, Pulse Oximetry: 96 %, O2 Daily Delivery Respiratory : Silicone Nasal Cannula     Work Of Breathing / Effort: Moderate  RUL Breath Sounds: Expiratory Wheezes, RML Breath Sounds: Clear, RLL Breath Sounds: Diminished, GARY Breath Sounds: Expiratory Wheezes, LLL Breath Sounds: Diminished    Fluids    Intake/Output Summary (Last 24 hours) at 18 1523  Last data filed at 18 0238   Gross per 24 hour   Intake              510 ml   Output              150 ml   Net              360 ml       Nutrition  Orders Placed This Encounter   Procedures   • Diet Order Regular     Standing Status:    Standing     Number of Occurrences:   1     Order Specific Question:   Diet:     Answer:   Regular [1]     Physical Exam   Constitutional: She is oriented to person, place, and time. She appears well-developed and well-nourished. No distress.   HENT:   Head: Normocephalic and atraumatic.   Right Ear: External ear normal.   Left Ear: External ear normal.   Mouth/Throat: No oropharyngeal exudate.   Eyes: Conjunctivae are normal. Right eye exhibits no discharge. Left eye exhibits no discharge.   Neck: Normal range of motion. No tracheal deviation present.   Cardiovascular: Normal rate, regular rhythm, normal heart sounds and intact distal pulses.    No murmur heard.  Pulmonary/Chest: Effort normal and breath sounds normal. No stridor. No respiratory distress. She has no wheezes.   Abdominal: Soft. Bowel sounds are normal. She exhibits no distension. There is no tenderness.   Musculoskeletal: She exhibits tenderness. She exhibits no edema.   Left hip fx s/p repair.  Tender.  Mobility limited by pain.   Neurological: She is alert and oriented to person, place, and time. No cranial nerve deficit.   Skin: Skin is warm and dry. No rash noted. She is not diaphoretic. No erythema.   Psychiatric: She has a normal mood and affect.   Nursing note and vitals reviewed.      Recent Labs      11/18/18   0600  11/19/18   0345   WBC  10.9*  15.8*   RBC  4.94  4.29   HEMOGLOBIN  14.8  12.9   HEMATOCRIT  43.1  38.5   MCV  87.2  89.7   MCH  30.0  30.1   MCHC  34.3  33.5*   RDW  44.4  45.9   PLATELETCT  249  248   MPV  9.4  9.8     Recent Labs      11/18/18   0600  11/19/18   0345   SODIUM  137  141   POTASSIUM  3.8  3.6   CHLORIDE  105  105   CO2  26  30   GLUCOSE  159*  155*   BUN  13  16   CREATININE  1.03  0.97   CALCIUM  8.7  9.0     Recent Labs      11/18/18   0600   APTT  25.5   INR  1.02                  Assessment/Plan     Closed intertrochanteric fracture of left femur (HCC)   Assessment & Plan    She found to have acute left  intertrochanteric femoral fracture.    She underwent left hip cephalomedullary nailing on 11/18  Pain controlled after surgery.  PT/OT.  Ortho surgery following.  Anticipate SNF at d/c.        Hyperglycemia   Assessment & Plan    Likely secondary to chronic steroids.  Check hgb a1c.     Fall   Assessment & Plan    She had a ground-level fall and she denies any loss of consciousness and syncope.  She underwent head CT scan and cervical spine CT scan which did not show any significant acute abnormalities.  Physical therapy and occupational therapy  Anticipate SNF at d/c.     Chronic kidney disease, stage III (moderate) (HCC)- (present on admission)   Assessment & Plan    History of chronic kidney disease.  Creatinine currently stable.  We will avoid nephrotoxin and use medications as per renal function.     Depression- (present on admission)   Assessment & Plan    Continue home medication     HTN (hypertension)- (present on admission)   Assessment & Plan    Continue amlodipine and lisinopril.     Asthma, moderate persistent, poorly-controlled- (present on admission)   Assessment & Plan    Has been taking steroids chronically.    Not in acute exacerbation.     UTI (urinary tract infection)   Assessment & Plan    Start ceftriaxone.       Quality-Core Measures   Reviewed items::  Radiology images reviewed, Labs reviewed and Medications reviewed  Avery catheter::  No Avery  DVT prophylaxis - mechanical:  SCDs  Antibiotics:  Treating active infection/contamination beyond 24 hours perioperative coverage

## 2018-11-19 NOTE — PROGRESS NOTES
Removed patient's albarran per MD order last night, 11/18, at 2300. Patient had not voided by 0500 11/19, so bladder scan ordered. Bladder scan only showed 160ml of urine. NS has been running at 100ml/hr throughout shift. Patient does not complain of feeling like she needs to urinate, and attempted to urinate before the bladder scan without success.

## 2018-11-19 NOTE — ASSESSMENT & PLAN NOTE
Head CT scan and cervical spine imaging negative for acute abnormalities.  Continue to work with PT/OT.  Continue fall precautions.  Anticipate d/c to SNF for continued therapy.

## 2018-11-20 PROBLEM — E87.6 HYPOKALEMIA: Status: ACTIVE | Noted: 2018-11-20

## 2018-11-20 LAB
ANION GAP SERPL CALC-SCNC: 7 MMOL/L (ref 0–11.9)
BACTERIA UR CULT: ABNORMAL
BACTERIA UR CULT: ABNORMAL
BUN SERPL-MCNC: 16 MG/DL (ref 8–22)
CALCIUM SERPL-MCNC: 8.8 MG/DL (ref 8.5–10.5)
CHLORIDE SERPL-SCNC: 104 MMOL/L (ref 96–112)
CO2 SERPL-SCNC: 26 MMOL/L (ref 20–33)
CREAT SERPL-MCNC: 0.87 MG/DL (ref 0.5–1.4)
ERYTHROCYTE [DISTWIDTH] IN BLOOD BY AUTOMATED COUNT: 45.9 FL (ref 35.9–50)
GLUCOSE SERPL-MCNC: 156 MG/DL (ref 65–99)
HCT VFR BLD AUTO: 34.9 % (ref 37–47)
HGB BLD-MCNC: 11.8 G/DL (ref 12–16)
MCH RBC QN AUTO: 30.3 PG (ref 27–33)
MCHC RBC AUTO-ENTMCNC: 33.8 G/DL (ref 33.6–35)
MCV RBC AUTO: 89.5 FL (ref 81.4–97.8)
PLATELET # BLD AUTO: 209 K/UL (ref 164–446)
PMV BLD AUTO: 10 FL (ref 9–12.9)
POTASSIUM SERPL-SCNC: 3.5 MMOL/L (ref 3.6–5.5)
RBC # BLD AUTO: 3.9 M/UL (ref 4.2–5.4)
SIGNIFICANT IND 70042: ABNORMAL
SITE SITE: ABNORMAL
SODIUM SERPL-SCNC: 137 MMOL/L (ref 135–145)
SOURCE SOURCE: ABNORMAL
WBC # BLD AUTO: 13.2 K/UL (ref 4.8–10.8)

## 2018-11-20 PROCEDURE — 700102 HCHG RX REV CODE 250 W/ 637 OVERRIDE(OP): Performed by: NURSE PRACTITIONER

## 2018-11-20 PROCEDURE — A9270 NON-COVERED ITEM OR SERVICE: HCPCS | Performed by: ORTHOPAEDIC SURGERY

## 2018-11-20 PROCEDURE — 51798 US URINE CAPACITY MEASURE: CPT

## 2018-11-20 PROCEDURE — A9270 NON-COVERED ITEM OR SERVICE: HCPCS | Performed by: NURSE PRACTITIONER

## 2018-11-20 PROCEDURE — 80048 BASIC METABOLIC PNL TOTAL CA: CPT

## 2018-11-20 PROCEDURE — 700105 HCHG RX REV CODE 258: Performed by: NURSE PRACTITIONER

## 2018-11-20 PROCEDURE — 700102 HCHG RX REV CODE 250 W/ 637 OVERRIDE(OP): Performed by: ORTHOPAEDIC SURGERY

## 2018-11-20 PROCEDURE — 770001 HCHG ROOM/CARE - MED/SURG/GYN PRIV*

## 2018-11-20 PROCEDURE — A9270 NON-COVERED ITEM OR SERVICE: HCPCS | Performed by: INTERNAL MEDICINE

## 2018-11-20 PROCEDURE — 700102 HCHG RX REV CODE 250 W/ 637 OVERRIDE(OP): Performed by: INTERNAL MEDICINE

## 2018-11-20 PROCEDURE — 85027 COMPLETE CBC AUTOMATED: CPT

## 2018-11-20 PROCEDURE — 700111 HCHG RX REV CODE 636 W/ 250 OVERRIDE (IP): Performed by: INTERNAL MEDICINE

## 2018-11-20 PROCEDURE — 700111 HCHG RX REV CODE 636 W/ 250 OVERRIDE (IP): Performed by: NURSE PRACTITIONER

## 2018-11-20 PROCEDURE — 36415 COLL VENOUS BLD VENIPUNCTURE: CPT

## 2018-11-20 PROCEDURE — 99232 SBSQ HOSP IP/OBS MODERATE 35: CPT | Performed by: INTERNAL MEDICINE

## 2018-11-20 PROCEDURE — 700105 HCHG RX REV CODE 258: Performed by: INTERNAL MEDICINE

## 2018-11-20 RX ORDER — POTASSIUM CHLORIDE 20 MEQ/1
20 TABLET, EXTENDED RELEASE ORAL ONCE
Status: COMPLETED | OUTPATIENT
Start: 2018-11-20 | End: 2018-11-20

## 2018-11-20 RX ADMIN — AMLODIPINE BESYLATE 5 MG: 5 TABLET ORAL at 05:22

## 2018-11-20 RX ADMIN — SODIUM CHLORIDE: 9 INJECTION, SOLUTION INTRAVENOUS at 04:05

## 2018-11-20 RX ADMIN — ALBUTEROL SULFATE 2 PUFF: 90 AEROSOL, METERED RESPIRATORY (INHALATION) at 16:57

## 2018-11-20 RX ADMIN — ACETAMINOPHEN 650 MG: 325 TABLET, FILM COATED ORAL at 17:03

## 2018-11-20 RX ADMIN — AMLODIPINE BESYLATE 5 MG: 5 TABLET ORAL at 17:02

## 2018-11-20 RX ADMIN — STANDARDIZED SENNA CONCENTRATE AND DOCUSATE SODIUM 2 TABLET: 8.6; 5 TABLET, FILM COATED ORAL at 17:03

## 2018-11-20 RX ADMIN — PREDNISONE 5 MG: 5 TABLET ORAL at 08:22

## 2018-11-20 RX ADMIN — STANDARDIZED SENNA CONCENTRATE AND DOCUSATE SODIUM 2 TABLET: 8.6; 5 TABLET, FILM COATED ORAL at 05:21

## 2018-11-20 RX ADMIN — ACETAMINOPHEN 650 MG: 325 TABLET, FILM COATED ORAL at 08:22

## 2018-11-20 RX ADMIN — OXYCODONE HYDROCHLORIDE 5 MG: 5 TABLET ORAL at 04:03

## 2018-11-20 RX ADMIN — SERTRALINE 200 MG: 100 TABLET, FILM COATED ORAL at 05:21

## 2018-11-20 RX ADMIN — ASPIRIN 325 MG: 325 TABLET, COATED ORAL at 05:22

## 2018-11-20 RX ADMIN — ALBUTEROL SULFATE 2 PUFF: 90 AEROSOL, METERED RESPIRATORY (INHALATION) at 01:55

## 2018-11-20 RX ADMIN — ALBUTEROL SULFATE 2 PUFF: 90 AEROSOL, METERED RESPIRATORY (INHALATION) at 08:31

## 2018-11-20 RX ADMIN — POLYETHYLENE GLYCOL 3350 1 PACKET: 17 POWDER, FOR SOLUTION ORAL at 05:21

## 2018-11-20 RX ADMIN — MAGNESIUM HYDROXIDE 30 ML: 400 SUSPENSION ORAL at 17:07

## 2018-11-20 RX ADMIN — ACETAMINOPHEN 650 MG: 325 TABLET, FILM COATED ORAL at 01:55

## 2018-11-20 RX ADMIN — POTASSIUM CHLORIDE 20 MEQ: 1500 TABLET, EXTENDED RELEASE ORAL at 11:43

## 2018-11-20 RX ADMIN — ASPIRIN 325 MG: 325 TABLET, COATED ORAL at 17:03

## 2018-11-20 RX ADMIN — LISINOPRIL 60 MG: 20 TABLET ORAL at 05:21

## 2018-11-20 RX ADMIN — CEFTRIAXONE SODIUM 2 G: 2 INJECTION, POWDER, FOR SOLUTION INTRAMUSCULAR; INTRAVENOUS at 15:08

## 2018-11-20 ASSESSMENT — PAIN SCALES - GENERAL
PAINLEVEL_OUTOF10: 5
PAINLEVEL_OUTOF10: 3
PAINLEVEL_OUTOF10: 3
PAINLEVEL_OUTOF10: 2
PAINLEVEL_OUTOF10: 4
PAINLEVEL_OUTOF10: 5
PAINLEVEL_OUTOF10: 7

## 2018-11-20 ASSESSMENT — ENCOUNTER SYMPTOMS
DIZZINESS: 0
WHEEZING: 0
VOMITING: 0
PND: 0
DIARRHEA: 0
HEADACHES: 0
DIAPHORESIS: 0
SPUTUM PRODUCTION: 0
COUGH: 0
PALPITATIONS: 0
BRUISES/BLEEDS EASILY: 0
INSOMNIA: 0
SINUS PAIN: 0
WEAKNESS: 0
HEMOPTYSIS: 0
DEPRESSION: 0
SENSORY CHANGE: 0
BLOOD IN STOOL: 0
CONSTIPATION: 1
ORTHOPNEA: 0
TREMORS: 0
CHILLS: 0
ABDOMINAL PAIN: 0
FOCAL WEAKNESS: 0
NAUSEA: 0
FALLS: 1
FEVER: 0
SHORTNESS OF BREATH: 0
SPEECH CHANGE: 0
NERVOUS/ANXIOUS: 0
LOSS OF CONSCIOUSNESS: 0

## 2018-11-20 NOTE — PROGRESS NOTES
Pt having frequency with urination but barely voiding anything via bedside commode. Bladder scan ordered. Notified MD Aguilera, received orders to straight cath and iff pt unable to void within 6hrs and next bladder scan shows greater than 400mL then place a albarran for retention.

## 2018-11-20 NOTE — PROGRESS NOTES
[facility] Medicine Daily Progress Note    Date of Service  11/20/2018    Chief Complaint  89 y.o. female admitted 11/18/2018 with ground level fall.    Hospital Course   The patient sustained a GLF at home while ambulating to the bathroom. She did hit her head but CT was negative for abnormalities.  Further imaging revealed a left femoral neck fracture & the patient subsequently underwent cephalomedullary hip nailing on 11/18/18 by Dr. Middleton.     Interval Problem Update  Pain is well-controlled.  Complains of tenderness to the touch.  Surgical incision uncomplicated.  States she slept well overnight. No issues.  No headache or neuro changes.  Afebrile, slightly tachycardic overnight.  -188. Most recent 148/82. On 2 LPM oxygen via NC (her baseline at home).  WBC trending down, 15.8 -> 13.2 overnight. Mild hypokalemia, 3.5. Will replete.    Consultants/Specialty  Orthopedic surgery    Code Status  DNAR/DNI    Disposition  11/19/18 PT/OT evaluation recommending physiatry consult.   SNF referral placed 11/19/18, but the patient would like to go to her son's 2 bedroom condo in lieu of going to a SNF.     Review of Systems  Review of Systems   Constitutional: Positive for malaise/fatigue. Negative for chills, diaphoresis and fever.   HENT: Negative for congestion, nosebleeds and sinus pain.    Respiratory: Negative for cough, hemoptysis, sputum production, shortness of breath and wheezing.    Cardiovascular: Negative for chest pain, palpitations, orthopnea, leg swelling and PND.   Gastrointestinal: Positive for constipation. Negative for abdominal pain, blood in stool, diarrhea, melena, nausea and vomiting.   Genitourinary: Negative for dysuria, frequency, hematuria and urgency.   Musculoskeletal: Positive for falls (PTA). Negative for joint pain.   Neurological: Negative for dizziness, tremors, sensory change, speech change, focal weakness, loss of consciousness, weakness and headaches.   Endo/Heme/Allergies: Does  not bruise/bleed easily.   Psychiatric/Behavioral: Negative for depression. The patient is not nervous/anxious and does not have insomnia.    All other systems reviewed and are negative.     Physical Exam  Temp:  [36.4 °C (97.6 °F)-37 °C (98.6 °F)] 37 °C (98.6 °F)  Pulse:  [] 98  Resp:  [14-20] 14  BP: (138-188)/() 141/85    Physical Exam   Constitutional: She is oriented to person, place, and time. She appears well-developed and well-nourished. No distress. Nasal cannula in place.   Elderly  Drowsy   HENT:   Head: Normocephalic and atraumatic.       Right Ear: External ear normal.   Left Ear: External ear normal.   Mouth/Throat: Oropharynx is clear and moist.   Eyes: Pupils are equal, round, and reactive to light. Right eye exhibits no discharge. Left eye exhibits no discharge. No scleral icterus.   Neck: Normal range of motion. Neck supple. No JVD present.   Cardiovascular: Normal rate, regular rhythm, normal heart sounds and intact distal pulses.  Exam reveals no gallop and no friction rub.    No murmur heard.  Pulmonary/Chest: Effort normal. No stridor. No respiratory distress. She has decreased breath sounds in the right lower field and the left lower field. She has no wheezes. She has no rhonchi. She has no rales.   Abdominal: Soft. Bowel sounds are normal. She exhibits no distension. There is no tenderness. There is no rebound and no guarding.   Musculoskeletal: Normal range of motion. She exhibits tenderness. She exhibits no edema.   Limited ROM of LLE due to pain with movement   Neurological: She is alert and oriented to person, place, and time. She has normal strength. No sensory deficit. GCS eye subscore is 4. GCS verbal subscore is 5. GCS motor subscore is 6.   Skin: Skin is warm and dry. No rash noted. She is not diaphoretic. No erythema. No pallor.        Psychiatric: She has a normal mood and affect. Her speech is normal and behavior is normal. Judgment and thought content normal.  Cognition and memory are normal.   Nursing note and vitals reviewed.    Fluids    Intake/Output Summary (Last 24 hours) at 11/20/18 1743  Last data filed at 11/20/18 0845   Gross per 24 hour   Intake                0 ml   Output              450 ml   Net             -450 ml     Laboratory  Recent Labs      11/18/18   0600  11/19/18   0345  11/20/18   0540   WBC  10.9*  15.8*  13.2*   RBC  4.94  4.29  3.90*   HEMOGLOBIN  14.8  12.9  11.8*   HEMATOCRIT  43.1  38.5  34.9*   MCV  87.2  89.7  89.5   MCH  30.0  30.1  30.3   MCHC  34.3  33.5*  33.8   RDW  44.4  45.9  45.9   PLATELETCT  249  248  209   MPV  9.4  9.8  10.0     Recent Labs      11/18/18   0600  11/19/18   0345  11/20/18   0540   SODIUM  137  141  137   POTASSIUM  3.8  3.6  3.5*   CHLORIDE  105  105  104   CO2  26  30  26   GLUCOSE  159*  155*  156*   BUN  13  16  16   CREATININE  1.03  0.97  0.87   CALCIUM  8.7  9.0  8.8     Recent Labs      11/18/18   0600   APTT  25.5   INR  1.02     Imaging  DX-PORTABLE FLUORO > 1 HOUR   Final Result         Portable fluoroscopy utilized for 84 seconds.         DX-HIP-UNILATERAL-W/O PELVIS-2/3 VIEWS LEFT   Final Result      1.  Recent reduction and internal fixation of LEFT proximal femur fracture.   2.  Diffuse osteopenia.      DX-HIP-UNILATERAL-W/O PELVIS-2/3 VIEWS LEFT   Final Result      Intraoperative image as above described.      CT-HIP W/O PLUS RECONS LEFT   Final Result      Acute left intertrochanteric femoral fracture with impaction, mild comminution      Moderate left femoral acetabular cartilage thinning      Severe colonic diverticulosis and atherosclerosis      CT-HEAD W/O   Final Result      Normal CT scan of the head without contrast.               INTERPRETING LOCATION:  [address] [place] [address], [id number]      CT-CSPINE WITHOUT PLUS RECONS   Final Result      No acute fracture or traumatic subluxation.      DX-HIP-UNILATERAL-WITH PELVIS-1 VIEW LEFT   Final Result      Left femoral neck fracture.          Assessment/Plan  Closed intertrochanteric fracture of left femur (HCC)   Assessment & Plan    S/p left hip cephalomedullary nailing on 11/18.  Continue to work with PT/OT.  Continue pain control.  Orthopedic surgery following.     Hyperglycemia   Assessment & Plan    Likely secondary to chronic steroids.  A1C 7.  Monitor.     Fall   Assessment & Plan    Head CT scan and cervical spine imaging negative for acute abnormalities.  Continue to work with PT/OT.  Continue fall precautions.  Anticipate d/c to SNF for continued therapy.     Chronic kidney disease, stage III (moderate) (HCC)- (present on admission)   Assessment & Plan    GFR > 60, creat WNL.  Avoid nephrotoxins & renally dose appropriate medications.  Continue to monitor.     Depression- (present on admission)   Assessment & Plan    Continue home zoloft.     HTN (hypertension)- (present on admission)   Assessment & Plan    SBP 140s today. Was as high as 188 overnight.  Continue home amlodipine and lisinopril.  Continue to monitor closely.     Asthma, moderate persistent, poorly-controlled- (present on admission)   Assessment & Plan    Has been on chronic steroids.  Not in acute exacerbation. Lungs clear. On her home level of O2 at 2 LPM.  RT protocol in place.     Hypokalemia   Assessment & Plan    Repleted orally x 1 today.  Recheck tomorrow a.m.     UTI (urinary tract infection)   Assessment & Plan    Detected on 11/18/18. Continue IV ceftriaxone.  Currently denies s/s.  Culture positive for strep viridans.  Sensitivities still pending.          VTE prophylaxis: SCD's, BID ASA as recommended by ortho

## 2018-11-20 NOTE — PROGRESS NOTES
Received bedside shift report at 1900.    Pt resting in bed at this time, A&O x4, declines pain intervention at this time. Dressing to left arm CDI. Dressing to surgical incision on left hip CDI. Pedal pulses 2+, cap refill <3 sec.     Pt slightly disoriented, though she was at home, attempting to get out of bed independently. Pt stated that she wanted to talk to Roni, her son. Charge called daughter, Isela and Phuong, granddaughter. Phuong answered, and talked to pt. Will attempt to get a hold of Roni. Pt reoriented to situation.      Call light and belongings within reach, bed down and locked, hourly rounding in progress. Pt reminded on need to call, expressed understanding.     Treaded socks in use, bed alarm in use set at 0 seconds.

## 2018-11-20 NOTE — PROGRESS NOTES
· 2 RN skin check complete.   · Devices in place SCDs in use.  · Skin assessed under devices no signs of breakdown noted  · Pt found to have diffuse bruising to interior thigh  · Skin tear to left elbow, foam in place  · Sx dressing to left hip CDI.  · The following interventions in place waffle mattress overlay placed, q2hr turning implemented.

## 2018-11-20 NOTE — CARE PLAN
Problem: Communication  Goal: The ability to communicate needs accurately and effectively will improve    Intervention: Educate patient and significant other/support system about the plan of care, procedures, treatments, medications and allow for questions  POC discussed with pt including unit routine, call light, and medications      Problem: Mobility  Goal: Risk for activity intolerance will decrease    Intervention: Encourage patient to increase activity level in collaboration with Interdisciplinary Team  Pt will be up to chair for all meals.

## 2018-11-20 NOTE — DISCHARGE PLANNING
After Visit Summary   1/6/2017    Jada Lion    MRN: [id number]           Patient Information     Date Of Birth          1992        Visit Information        Provider Department      1/6/2017 10:40 AM Ariana Gonzalez PA-C [facility]        Today's Diagnoses     Tobacco use disorder    -  1     Cough         Moderate persistent asthma with acute exacerbation         Moderate persistent asthma without complication           Care Instructions    Based on your medical history and these are the current health maintenance or preventive care services that you are due for (some may have been done at this visit)  Health Maintenance Due   Topic Date Due     HPV IMMUNIZATION (1 of 3 - Male 3 Dose Series) 07/21/2003     INFLUENZA VACCINE (SYSTEM ASSIGNED)  09/01/2016     ASTHMA CONTROL TEST Q6 MOS (NO INBASKET)  02/03/2017       At [facility], we strive to deliver an exceptional experience to you, every time we see you.    If you receive a survey in the mail, please send us back your thoughts. We really do value your feedback.    Your care team's suggested websites for health information:  Www.[employer].org : Up to date and easily searchable information on multiple topics.  Www.medlineplus.gov : medication info, interactive tutorials, watch real surgeries online  Www.familydoctor.org : good info from the Academy of Family Physicians  Www.cdc.gov : public health info, travel advisories, epidemics (H1N1)  Www.aap.org : children's health info, normal development, vaccinations  Www.health.[facility].[place].us : [place] dept of health, public health issues in [place], N1N1    How to contact your care team:   Team [facility] [phone]         Pharmacy [phone]    Dr. Joseph, Tracey Estrada PA-C, Dr. Hernández, Kira Galeano APRN CNP, Ariana Gonzalez PA-C, Dr. Blanton, and Ligia Gaona CNP    Team RNs: Unique Garcia      Clinic hours  M-Th 7 am-7 pm   Fri 7 am-5  Anticipated Discharge Disposition: SNF    Action: LSW met w/ pt at bedside to explain and receive choice for SNF. Pt requested a day to decide. LSW provided pt w/ medicare.gov ratings for local [place] SNFs.     Barriers to Discharge: None    Plan: Pending m/c, SNF choice, and SNF acceptance.   pm.   Urgent care M-F 11 am-9 pm,   Sat/Sun 9 am-5 pm.  Pharmacy M-Th 8 am-8 pm Fri 8 am-6 pm  Sat/Sun 9 am-5 pm.     All password changes, disabled accounts, or ID changes in [employer]/MyHealth will be done by our Access Services Department.    If you need help with your account or password, call: [phone]. Clinic staff no longer has the ability to change passwords.             Follow-ups after your visit        Additional Services     PULMONARY MEDICINE REFERRAL       Your provider has referred you to: FMG: [facility] [phone]   [email or web address]  P: [facility] [phone]   [email or web address]  P: [facility] [place] [phone]   [email or web address]/[email or web address]/  [facility]: [facility] [place] [phone]   [email or web address]    Please be aware that coverage of these services is subject to the terms and limitations of your health insurance plan.  Call member services at your health plan with any benefit or coverage questions.      Please bring the following with you to your appointment:    (1) Any X-Rays, CTs or MRIs which have been performed.  Contact the facility where they were done to arrange for  prior to your scheduled appointment.    (2) List of current medications   (3) This referral request   (4) Any documents/labs given to you for this referral                  Who to contact     If you have questions or need follow up information about today's clinic visit or your schedule please contact [facility] LAURA JENNINGS directly at [phone].  Normal or non-critical lab and imaging results will be communicated to you by MyChart, letter or phone within 4 business days after the clinic has received the  "results. If you do not hear from us within 7 days, please contact the clinic through [employer] or phone. If you have a critical or abnormal lab result, we will notify you by phone as soon as possible.  Submit refill requests through [employer] or call your pharmacy and they will forward the refill request to us. Please allow 3 business days for your refill to be completed.          Additional Information About Your Visit        [employer] Information     [employer] gives you secure access to your electronic health record. If you see a primary care provider, you can also send messages to your care team and make appointments. If you have questions, please call your primary care clinic.  If you do not have a primary care provider, please call [phone] and they will assist you.        Care EveryWhere ID     This is your Care EveryWhere ID. This could be used by other organizations to access your [place] medical records  [id number]        Your Vitals Were     Pulse Temperature Height BMI (Body Mass Index) Pulse Oximetry       106 97.4  F (36.3  C) (Oral) 5' 6.14\" (1.68 m) 26.84 kg/m2 98%        Blood Pressure from Last 3 Encounters:   01/06/17 134/72   08/03/16 115/70   05/13/16 110/70    Weight from Last 3 Encounters:   01/06/17 167 lb (75.751 kg)   08/03/16 164 lb (74.39 kg)   07/21/16 164 lb 12.8 oz (74.753 kg)              We Performed the Following     PULMONARY MEDICINE REFERRAL     XR Chest 2 Views          Today's Medication Changes          These changes are accurate as of: 1/6/17 11:03 AM.  If you have any questions, ask your nurse or doctor.               Start taking these medicines.        Dose/Directions    predniSONE 20 MG tablet   Commonly known as:  DELTASONE   Used for:  Moderate persistent asthma with acute exacerbation   Started by:  Ariana Gonzalez PA-C        Take 3 tabs (60 mg) by mouth daily x 3 days, 2 tabs (40 mg) daily x 3 days, 1 tab (20 mg) daily x 3 days, then 1/2 tab (10 mg) x 3 days. "   Quantity:  20 tablet   Refills:  0            Where to get your medicines      These medications were sent to [facility] [id number] [facility] - [facility], [place] - [address]  [address] [facility] [place] [address]     Phone:  [phone]    - albuterol (2.5 MG/3ML) 0.083% neb solution  - albuterol 108 (90 BASE) MCG/ACT Inhaler  - predniSONE 20 MG tablet             Primary Care Provider Office Phone # Fax #    Ariana Gonzalez PA-C [phone]       [facility] [address]  [facility] [address]        Thank you!     Thank you for choosing [facility]  for your care. Our goal is always to provide you with excellent care. Hearing back from our patients is one way we can continue to improve our services. Please take a few minutes to complete the written survey that you may receive in the mail after your visit with us. Thank you!             Your Updated Medication List - Protect others around you: Learn how to safely use, store and throw away your medicines at www.disposemymeds.org.          This list is accurate as of: 1/6/17 11:03 AM.  Always use your most recent med list.                   Brand Name Dispense Instructions for use    * albuterol 108 (90 BASE) MCG/ACT Inhaler    albuterol    3 Inhaler    Inhale 2 puffs into the lungs every 6 hours       * albuterol (2.5 MG/3ML) 0.083% neb solution     60 vial    Take 1 vial (2.5 mg) by nebulization every 6 hours as needed for shortness of breath / dyspnea or wheezing       fluticasone-salmeterol 230-21 MCG/ACT inhaler    ADVAIR-HFA    1 Inhaler    Inhale 2 puffs into the lungs 2 times daily       order for DME     1 Device    1 nebulizer machine with tubing       predniSONE 20 MG tablet    DELTASONE    20 tablet    Take 3 tabs (60 mg) by mouth daily x 3 days, 2 tabs (40 mg) daily x 3 days, 1 tab (20 mg) daily x 3 days, then 1/2 tab (10 mg) x 3 days.       * Notice:  This list has 2 medication(s) that  are the same as other medications prescribed for you. Read the directions carefully, and ask your doctor or other care provider to review them with you.

## 2018-11-20 NOTE — CARE PLAN
Problem: Safety  Goal: Will remain free from falls  Call light and belongings within reach, bed down and locked, hourly rounding in progress. Treaded socks in use, DME in bathroom, bed alarm in use. Pt reminded to use call light.     Problem: Venous Thromboembolism (VTW)/Deep Vein Thrombosis (DVT) Prevention:  Goal: Patient will participate in Venous Thrombosis (VTE)/Deep Vein Thrombosis (DVT)Prevention Measures  SCD's in use, apririn for pharmacologic prevention.

## 2018-11-20 NOTE — PROGRESS NOTES
"Progress Note     Interval changes:improved     ROS - Patient denies any new issues. No chest pain, dyspnea, or fever.  Pain well controlled.   /82   Pulse (!) 103   Temp 36.4 °C (97.6 °F) (Temporal)   Resp 16   Ht 1.626 m (5' 4\")   Wt 54 kg (119 lb 0.8 oz)   SpO2 95%   Breastfeeding? No   BMI 20.43 kg/m²      Patient seen and examined  No acute distress  Breathing non labored  RRR  Left hip clean dry intact  +EPL/FPL/TA  SILT m/l/1st dws  wwp toes  Recent Labs      11/18/18   0600  11/19/18   0345  11/20/18   0540   WBC  10.9*  15.8*  13.2*   RBC  4.94  4.29  3.90*   HEMOGLOBIN  14.8  12.9  11.8*   HEMATOCRIT  43.1  38.5  34.9*   MCV  87.2  89.7  89.5   MCH  30.0  30.1  30.3   RDW  44.4  45.9  45.9   PLATELETCT  249  248  209   MPV  9.4  9.8  10.0   NEUTSPOLYS  57.40  77.10*   --    LYMPHOCYTES  34.40  15.10*   --    MONOCYTES  5.10  6.10   --    EOSINOPHILS  1.50  0.90   --    BASOPHILS  0.40  0.30   --      Recent Labs      11/18/18   0600  11/19/18   0345  11/20/18   0540   SODIUM  137  141  137   POTASSIUM  3.8  3.6  3.5*   CHLORIDE  105  105  104   CO2  26  30  26   GLUCOSE  159*  155*  156*   BUN  13  16  16        A/P: POD#2 left hip IMHS  Pain control  WBAT  Rocephin for UTI  PT/OT- likely rehab  ASA for DVT prophylaxis       Patient Active Problem List   Diagnosis   • Asthma, moderate persistent, poorly-controlled   • COPD (chronic obstructive pulmonary disease) ([facility])   • HTN (hypertension)   • Vitamin D insufficiency   • Depression   • Fatigue   • Hyperlipidemia   • Grief reaction   • Insomnia   • Nocturnal hypoxia   • Urinary frequency   • Constipation   • At risk for falls   • Recurrent UTI   • Nausea   • Chronic kidney disease, stage III (moderate) ([facility])   • Urinary retention   • Closed intertrochanteric fracture of left femur ([facility])   • Fall   • UTI (urinary tract infection)   • Hyperglycemia     "

## 2018-11-21 VITALS
SYSTOLIC BLOOD PRESSURE: 144 MMHG | DIASTOLIC BLOOD PRESSURE: 65 MMHG | TEMPERATURE: 98.1 F | HEIGHT: 64 IN | RESPIRATION RATE: 14 BRPM | BODY MASS INDEX: 20.32 KG/M2 | WEIGHT: 119.05 LBS | OXYGEN SATURATION: 93 % | HEART RATE: 98 BPM

## 2018-11-21 PROBLEM — E87.6 HYPOKALEMIA: Status: RESOLVED | Noted: 2018-11-20 | Resolved: 2018-11-21

## 2018-11-21 LAB
ALBUMIN SERPL BCP-MCNC: 2.9 G/DL (ref 3.2–4.9)
ALBUMIN/GLOB SERPL: 1.1 G/DL
ALP SERPL-CCNC: 77 U/L (ref 30–99)
ALT SERPL-CCNC: <5 U/L (ref 2–50)
ANION GAP SERPL CALC-SCNC: 6 MMOL/L (ref 0–11.9)
AST SERPL-CCNC: 8 U/L (ref 12–45)
BASOPHILS # BLD AUTO: 0.3 % (ref 0–1.8)
BASOPHILS # BLD: 0.04 K/UL (ref 0–0.12)
BILIRUB SERPL-MCNC: 0.5 MG/DL (ref 0.1–1.5)
BUN SERPL-MCNC: 13 MG/DL (ref 8–22)
CALCIUM SERPL-MCNC: 8.8 MG/DL (ref 8.5–10.5)
CHLORIDE SERPL-SCNC: 106 MMOL/L (ref 96–112)
CO2 SERPL-SCNC: 28 MMOL/L (ref 20–33)
CREAT SERPL-MCNC: 0.8 MG/DL (ref 0.5–1.4)
EOSINOPHIL # BLD AUTO: 0.33 K/UL (ref 0–0.51)
EOSINOPHIL NFR BLD: 2.8 % (ref 0–6.9)
ERYTHROCYTE [DISTWIDTH] IN BLOOD BY AUTOMATED COUNT: 45.1 FL (ref 35.9–50)
GLOBULIN SER CALC-MCNC: 2.7 G/DL (ref 1.9–3.5)
GLUCOSE SERPL-MCNC: 170 MG/DL (ref 65–99)
HCT VFR BLD AUTO: 34 % (ref 37–47)
HGB BLD-MCNC: 11.7 G/DL (ref 12–16)
IMM GRANULOCYTES # BLD AUTO: 0.07 K/UL (ref 0–0.11)
IMM GRANULOCYTES NFR BLD AUTO: 0.6 % (ref 0–0.9)
LYMPHOCYTES # BLD AUTO: 2.29 K/UL (ref 1–4.8)
LYMPHOCYTES NFR BLD: 19.1 % (ref 22–41)
MAGNESIUM SERPL-MCNC: 2 MG/DL (ref 1.5–2.5)
MCH RBC QN AUTO: 30.5 PG (ref 27–33)
MCHC RBC AUTO-ENTMCNC: 34.4 G/DL (ref 33.6–35)
MCV RBC AUTO: 88.5 FL (ref 81.4–97.8)
MONOCYTES # BLD AUTO: 0.82 K/UL (ref 0–0.85)
MONOCYTES NFR BLD AUTO: 6.9 % (ref 0–13.4)
NEUTROPHILS # BLD AUTO: 8.41 K/UL (ref 2–7.15)
NEUTROPHILS NFR BLD: 70.3 % (ref 44–72)
NRBC # BLD AUTO: 0 K/UL
NRBC BLD-RTO: 0 /100 WBC
PLATELET # BLD AUTO: 229 K/UL (ref 164–446)
PMV BLD AUTO: 9.8 FL (ref 9–12.9)
POTASSIUM SERPL-SCNC: 3.7 MMOL/L (ref 3.6–5.5)
PROT SERPL-MCNC: 5.6 G/DL (ref 6–8.2)
RBC # BLD AUTO: 3.84 M/UL (ref 4.2–5.4)
SODIUM SERPL-SCNC: 140 MMOL/L (ref 135–145)
WBC # BLD AUTO: 12 K/UL (ref 4.8–10.8)

## 2018-11-21 PROCEDURE — 0T9B70Z DRAINAGE OF BLADDER WITH DRAINAGE DEVICE, VIA NATURAL OR ARTIFICIAL OPENING: ICD-10-PCS | Performed by: INTERNAL MEDICINE

## 2018-11-21 PROCEDURE — 700102 HCHG RX REV CODE 250 W/ 637 OVERRIDE(OP): Performed by: INTERNAL MEDICINE

## 2018-11-21 PROCEDURE — A9270 NON-COVERED ITEM OR SERVICE: HCPCS | Performed by: ORTHOPAEDIC SURGERY

## 2018-11-21 PROCEDURE — 700102 HCHG RX REV CODE 250 W/ 637 OVERRIDE(OP): Performed by: ORTHOPAEDIC SURGERY

## 2018-11-21 PROCEDURE — 85025 COMPLETE CBC W/AUTO DIFF WBC: CPT

## 2018-11-21 PROCEDURE — 80053 COMPREHEN METABOLIC PANEL: CPT

## 2018-11-21 PROCEDURE — 83735 ASSAY OF MAGNESIUM: CPT

## 2018-11-21 PROCEDURE — 36415 COLL VENOUS BLD VENIPUNCTURE: CPT

## 2018-11-21 PROCEDURE — 51798 US URINE CAPACITY MEASURE: CPT

## 2018-11-21 PROCEDURE — 99239 HOSP IP/OBS DSCHRG MGMT >30: CPT | Performed by: INTERNAL MEDICINE

## 2018-11-21 PROCEDURE — 700111 HCHG RX REV CODE 636 W/ 250 OVERRIDE (IP): Performed by: INTERNAL MEDICINE

## 2018-11-21 PROCEDURE — A9270 NON-COVERED ITEM OR SERVICE: HCPCS | Performed by: INTERNAL MEDICINE

## 2018-11-21 PROCEDURE — 306015 LOCK STAT FOLEY: Performed by: INTERNAL MEDICINE

## 2018-11-21 RX ORDER — HYDRALAZINE HYDROCHLORIDE 20 MG/ML
20 INJECTION INTRAMUSCULAR; INTRAVENOUS EVERY 6 HOURS PRN
Status: DISCONTINUED | OUTPATIENT
Start: 2018-11-21 | End: 2018-11-21 | Stop reason: HOSPADM

## 2018-11-21 RX ORDER — ACETAMINOPHEN 325 MG/1
650 TABLET ORAL EVERY 6 HOURS PRN
Qty: 30 TAB | Refills: 0 | Status: SHIPPED | OUTPATIENT
Start: 2018-11-21 | End: 2018-12-11 | Stop reason: CLARIF

## 2018-11-21 RX ORDER — AMOXICILLIN 250 MG
2 CAPSULE ORAL 2 TIMES DAILY
Qty: 30 TAB | Refills: 0 | Status: SHIPPED | OUTPATIENT
Start: 2018-11-21

## 2018-11-21 RX ORDER — POLYETHYLENE GLYCOL 3350 17 G/17G
17 POWDER, FOR SOLUTION ORAL
Refills: 3
Start: 2018-11-21

## 2018-11-21 RX ORDER — ASPIRIN 325 MG
325 TABLET, DELAYED RELEASE (ENTERIC COATED) ORAL 2 TIMES DAILY
Qty: 60 TAB
Start: 2018-11-21 | End: 2018-12-11 | Stop reason: CLARIF

## 2018-11-21 RX ORDER — BISACODYL 10 MG
10 SUPPOSITORY, RECTAL RECTAL
Refills: 0
Start: 2018-11-21 | End: 2018-12-11 | Stop reason: CLARIF

## 2018-11-21 RX ORDER — LISINOPRIL 30 MG/1
60 TABLET ORAL EVERY MORNING
Start: 2018-11-22

## 2018-11-21 RX ORDER — OXYCODONE HYDROCHLORIDE 5 MG/1
2.5-5 TABLET ORAL EVERY 6 HOURS PRN
Qty: 12 TAB | Refills: 0 | Status: SHIPPED | OUTPATIENT
Start: 2018-11-21 | End: 2018-11-24

## 2018-11-21 RX ADMIN — BISACODYL 10 MG: 10 SUPPOSITORY RECTAL at 12:58

## 2018-11-21 RX ADMIN — LISINOPRIL 60 MG: 20 TABLET ORAL at 05:31

## 2018-11-21 RX ADMIN — ALBUTEROL SULFATE 2 PUFF: 90 AEROSOL, METERED RESPIRATORY (INHALATION) at 04:18

## 2018-11-21 RX ADMIN — SERTRALINE 200 MG: 100 TABLET, FILM COATED ORAL at 05:30

## 2018-11-21 RX ADMIN — ALBUTEROL SULFATE 2 PUFF: 90 AEROSOL, METERED RESPIRATORY (INHALATION) at 10:09

## 2018-11-21 RX ADMIN — AMLODIPINE BESYLATE 5 MG: 5 TABLET ORAL at 05:30

## 2018-11-21 RX ADMIN — ACETAMINOPHEN 650 MG: 325 TABLET, FILM COATED ORAL at 14:55

## 2018-11-21 RX ADMIN — PREDNISONE 5 MG: 5 TABLET ORAL at 09:14

## 2018-11-21 RX ADMIN — ASPIRIN 325 MG: 325 TABLET, COATED ORAL at 05:31

## 2018-11-21 RX ADMIN — STANDARDIZED SENNA CONCENTRATE AND DOCUSATE SODIUM 2 TABLET: 8.6; 5 TABLET, FILM COATED ORAL at 05:32

## 2018-11-21 NOTE — DISCHARGE INSTRUCTIONS
Discharge Instructions    Discharged to other by medical transportation with self. Discharged via ambulance, hospital escort: Refused.  Special equipment needed: Oxygen    Be sure to schedule a follow-up appointment with your primary care doctor or any specialists as instructed.     Discharge Plan:   Diet Plan: Discussed  Activity Level: Discussed  Confirmed Follow up Appointment: Patient to Call and Schedule Appointment  Confirmed Symptoms Management: Discussed  Medication Reconciliation Updated: Yes  Pneumococcal Vaccine Administered/Refused: Not given - Patient refused pneumococcal vaccine  Influenza Vaccine Indication: Patient Refuses    I understand that a diet low in cholesterol, fat, and sodium is recommended for good health. Unless I have been given specific instructions below for another diet, I accept this instruction as my diet prescription.       Special Instructions: Discharge instructions for the Orthopedic Patient    Follow up with Primary Care Physician within 2 weeks of discharge to home, regarding:  Review of medications and diagnostic testing.  Surveillance for medical complications.  Workup and treatment of osteoporosis, if appropriate.     -Is this a Joint Replacement patient? No    -Is this patient being discharged with medication to prevent blood clots?  Yes, Aspirin Aspirin, ASA oral tablets  What is this medicine?  ASPIRIN (AS pir in) is a pain reliever. It is used to treat mild pain and fever. This medicine is also used as directed by a doctor to prevent and to treat heart attacks, to prevent strokes, and to treat arthritis or inflammation.  This medicine may be used for other purposes; ask your health care provider or pharmacist if you have questions.  COMMON BRAND NAME(S): Aspir-Low, Aspir-Yadira, Aspirtab, Jeanine Advanced Aspirin, Jeanine Aspirin, Jeanine Aspirin Extra Strength, Jeanine Aspirin Plus, Jeanine Extra Strength, Jeanine Extra Strength Plus, Jeanine Genuine Aspirin, Jeanine Womens Aspirin,  Bufferin, Bufferin Extra Strength, Bufferin Low Dose  What should I tell my health care provider before I take this medicine?  They need to know if you have any of these conditions:  -anemia  -asthma  -bleeding problems  -child with chickenpox, the flu, or other viral infection  -diabetes  -gout  -if you frequently drink alcohol containing drinks  -kidney disease  -liver disease  -low level of vitamin K  -lupus  -smoke tobacco  -stomach ulcers or other problems  -an unusual or allergic reaction to aspirin, tartrazine dye, other medicines, dyes, or preservatives  -pregnant or trying to get pregnant  -breast-feeding  How should I use this medicine?  Take this medicine by mouth with a glass of water. Follow the directions on the package or prescription label. You can take this medicine with or without food. If it upsets your stomach, take it with food. Do not take your medicine more often than directed.  Talk to your pediatrician regarding the use of this medicine in children. While this drug may be prescribed for children as young as 12 years of age for selected conditions, precautions do apply. Children and teenagers should not use this medicine to treat chicken pox or flu symptoms unless directed by a doctor.  Patients over 65 years old may have a stronger reaction and need a smaller dose.  Overdosage: If you think you have taken too much of this medicine contact a poison control center or emergency room at once.  NOTE: This medicine is only for you. Do not share this medicine with others.  What if I miss a dose?  If you are taking this medicine on a regular schedule and miss a dose, take it as soon as you can. If it is almost time for your next dose, take only that dose. Do not take double or extra doses.  What may interact with this medicine?  Do not take this medicine with any of the following medications:  -cidofovir  -ketorolac  -probenecid  This medicine may also interact with the following  medications:  -alcohol  -alendronate  -bismuth subsalicylate  -flavocoxid  -herbal supplements like feverfew, garlic, zay, ginkgo biloba, horse chestnut  -medicines for diabetes or glaucoma like acetazolamide, methazolamide  -medicines for gout  -medicines that treat or prevent blood clots like enoxaparin, heparin, ticlopidine, warfarin  -other aspirin and aspirin-like medicines  -NSAIDs, medicines for pain and inflammation, like ibuprofen or naproxen  -pemetrexed  -sulfinpyrazone  -varicella live vaccine  This list may not describe all possible interactions. Give your health care provider a list of all the medicines, herbs, non-prescription drugs, or dietary supplements you use. Also tell them if you smoke, drink alcohol, or use illegal drugs. Some items may interact with your medicine.  What should I watch for while using this medicine?  If you are treating yourself for pain, tell your doctor or health care professional if the pain lasts more than 10 days, if it gets worse, or if there is a new or different kind of pain. Tell your doctor if you see redness or swelling. Also, check with your doctor if you have a fever that lasts for more than 3 days. Only take this medicine to prevent heart attacks or blood clotting if prescribed by your doctor or health care professional.  Do not take aspirin or aspirin-like medicines with this medicine. Too much aspirin can be dangerous. Always read the labels carefully.  This medicine can irritate your stomach or cause bleeding problems. Do not smoke cigarettes or drink alcohol while taking this medicine. Do not lie down for 30 minutes after taking this medicine to prevent irritation to your throat.  If you are scheduled for any medical or dental procedure, tell your healthcare provider that you are taking this medicine. You may need to stop taking this medicine before the procedure.  This medicine may be used to treat migraines. If you take migraine medicines for 10 or more  days a month, your migraines may get worse. Keep a diary of headache days and medicine use. Contact your healthcare professional if your migraine attacks occur more frequently.  What side effects may I notice from receiving this medicine?  Side effects that you should report to your doctor or health care professional as soon as possible:  -allergic reactions like skin rash, itching or hives, swelling of the face, lips, or tongue  -breathing problems  -changes in hearing, ringing in the ears  -confusion  -general ill feeling or flu-like symptoms  -pain on swallowing  -redness, blistering, peeling or loosening of the skin, including inside the mouth or nose  -signs and symptoms of bleeding such as bloody or black, tarry stools; red or dark-brown urine; spitting up blood or brown material that looks like coffee grounds; red spots on the skin; unusual bruising or bleeding from the eye, gums, or nose  -trouble passing urine or change in the amount of urine  -unusually weak or tired  -yellowing of the eyes or skin  Side effects that usually do not require medical attention (report to your doctor or health care professional if they continue or are bothersome):  -diarrhea or constipation  -headache  -nausea, vomiting  -stomach gas, heartburn  This list may not describe all possible side effects. Call your doctor for medical advice about side effects. You may report side effects to FDA at 5-429-FDA-6690.  Where should I keep my medicine?  Keep out of the reach of children.  Store at room temperature between 15 and 30 degrees C (59 and 86 degrees F). Protect from heat and moisture. Do not use this medicine if it has a strong vinegar smell. Throw away any unused medicine after the expiration date.  NOTE: This sheet is a summary. It may not cover all possible information. If you have questions about this medicine, talk to your doctor, pharmacist, or health care provider.  © 2018 Elsevier/Gold Standard (2014-08-19  11:30:31)      · Is patient discharged on Warfarin / Coumadin?   No     Depression / Suicide Risk    As you are discharged from this [facility] facility, it is important to learn how to keep safe from harming yourself.    Recognize the warning signs:  · Abrupt changes in personality, positive or negative- including increase in energy   · Giving away possessions  · Change in eating patterns- significant weight changes-  positive or negative  · Change in sleeping patterns- unable to sleep or sleeping all the time   · Unwillingness or inability to communicate  · Depression  · Unusual sadness, discouragement and loneliness  · Talk of wanting to die  · Neglect of personal appearance   · Rebelliousness- reckless behavior  · Withdrawal from people/activities they love  · Confusion- inability to concentrate     If you or a loved one observes any of these behaviors or has concerns about self-harm, here's what you can do:  · Talk about it- your feelings and reasons for harming yourself  · Remove any means that you might use to hurt yourself (examples: pills, rope, extension cords, firearm)  · Get professional help from the community (Mental Health, Substance Abuse, psychological counseling)  · Do not be alone:Call your Safe Contact- someone whom you trust who will be there for you.  · Call your local CRISIS HOTLINE 994-7658 or 077-538-0704  · Call your local Children's Mobile Crisis Response Team [place] (650) 869-3838 or www.[place]  · Call the toll free National Suicide Prevention Hotlines   · National Suicide Prevention Lifeline 369-647-UNXI (8252)  · National Hope Line Network 800-SUICIDE (622-5068)

## 2018-11-21 NOTE — PROGRESS NOTES
Pt has been having difficulty emptying bladder. Pt voids very little at a time. Latest bladder scan 612 ml. Avery catheter placed per order.

## 2018-11-21 NOTE — DISCHARGE PLANNING
Agency[facility]Facility Name: [facility]  Spoke To: Tika  Outcome: Accepted, bed today    Transportation set up for 1430 via [facility] to [facility].

## 2018-11-21 NOTE — PROGRESS NOTES
"Progress Note     Interval changes:improved. Pain diminished     ROS - Patient denies any new issues. No chest pain, dyspnea, or fever.  Pain well controlled.   /77   Pulse 86   Temp 36.8 °C (98.2 °F) (Temporal)   Resp 18   Ht 1.626 m (5' 4\")   Wt 54 kg (119 lb 0.8 oz)   SpO2 93%   Breastfeeding? No   BMI 20.43 kg/m²      Patient seen and examined  No acute distress  Breathing non labored  RRR  Left hip incision c/d/i  +EHL/FHL/TA  SILT m/l/1st dws  wwp toes  Recent Labs      11/19/18 0345 11/20/18   0540  11/21/18   0439   WBC  15.8*  13.2*  12.0*   RBC  4.29  3.90*  3.84*   HEMOGLOBIN  12.9  11.8*  11.7*   HEMATOCRIT  38.5  34.9*  34.0*   MCV  89.7  89.5  88.5   MCH  30.1  30.3  30.5   RDW  45.9  45.9  45.1   PLATELETCT  248  209  229   MPV  9.8  10.0  9.8   NEUTSPOLYS  77.10*   --   70.30   LYMPHOCYTES  15.10*   --   19.10*   MONOCYTES  6.10   --   6.90   EOSINOPHILS  0.90   --   2.80   BASOPHILS  0.30   --   0.30     Recent Labs      11/19/18 0345 11/20/18   0540  11/21/18   0439   SODIUM  141  137  140   POTASSIUM  3.6  3.5*  3.7   CHLORIDE  105  104  106   CO2  30  26  28   GLUCOSE  155*  156*  170*   BUN  16  16  13        A/P: POD#3 left hip IMHS  Pain control  WBAT  Rocephin for UTI  PT/OT- likely rehab  ASA for DVT prophylaxis       Patient Active Problem List   Diagnosis   • Asthma, moderate persistent, poorly-controlled   • COPD (chronic obstructive pulmonary disease) ([facility])   • HTN (hypertension)   • Vitamin D insufficiency   • Depression   • Fatigue   • Hyperlipidemia   • Grief reaction   • Insomnia   • Nocturnal hypoxia   • Urinary frequency   • Constipation   • At risk for falls   • Recurrent UTI   • Nausea   • Chronic kidney disease, stage III (moderate) ([facility])   • Urinary retention   • Closed intertrochanteric fracture of left femur ([facility])   • Fall   • UTI (urinary tract infection)   • Hyperglycemia   • Hypokalemia     "

## 2018-11-21 NOTE — DISCHARGE PLANNING
Received Choice form at 4751  Agency/Facility Name: [facility], [facility]3 [facility]  Referral sent per Choice form @ 7435

## 2018-11-21 NOTE — DISCHARGE SUMMARY
Discharge Summary    CHIEF COMPLAINT ON ADMISSION  Chief Complaint   Patient presents with   • GLF     Reason for Admission  Ground level fall     CODE STATUS  DNAR/DNI    HPI & HOSPITAL COURSE  This is a 89 y.o. female admitted after sustaining a ground level fall while ambulating to the bathroom at home. She did hit her head and has a lump with ecchymosis on the posterior side of her left head, though CT was negative for acute abnormalities.  Further imaging revealed a left femoral neck fracture & the patient subsequently underwent cephalomedullary hip nailing on 11/18/18 by Dr. Middleton. The operative site is uncomplicated.  Postoperatively she has done well, with her only issues being urinary tract infection with urinary retention requiring albarran catheter placement, which her family states she has had before with prior surgeries. Her pain has been controlled with oxycodone and her vital signs have been stable outside of occasional episodes of hypertension with systolic blood pressures in the 180s. Per the direction of orthopedic surgery, she is to be kept on twice daily aspirin for DVT prophylaxis. Also of note, she has received 2 doses of IV ceftriaxone for her UTI thus far, with her last dose scheduled for this evening.      She is being transferred to [facility] today for further rehab.     Therefore, she is discharged in good and stable condition to skilled nursing facility.    The patient met 2-midnight criteria for an inpatient stay at the time of discharge.    FOLLOW UP ITEMS POST DISCHARGE  Orthopedic surgery-Dr. Middleton-return within 2 weeks for follow-up  PCP after discharge home    DISCHARGE DIAGNOSES  Active Problems:    Closed intertrochanteric fracture of left femur (HCC) (Chronic) POA: Yes    Fall POA: Yes    Depression POA: Yes    UTI (urinary tract infection) POA: Clinically Undetermined    Hyperglycemia POA: Yes    Asthma, moderate persistent, poorly-controlled POA: Yes    HTN  (hypertension) POA: Yes    Chronic kidney disease, stage III (moderate) (HCC) POA: Yes  Resolved Problems:    Hypokalemia POA: Unknown    FOLLOW UP  No future appointments.  No follow-up provider specified.    MEDICATIONS ON DISCHARGE     Medication List      START taking these medications      Instructions   acetaminophen 325 MG Tabs  Commonly known as:  TYLENOL   Take 2 Tabs by mouth every 6 hours as needed (Mild Pain; (Pain scale 1-3); Temp greater than 100.5 F).  Dose:  650 mg     Aspirin 325 MG Tbec   Take 1 Tab by mouth 2 Times a Day.  Dose:  325 mg     bisacodyl 10 MG Supp  Commonly known as:  DULCOLAX   Insert 1 Suppository in rectum 1 time daily as needed (if magnesium hydroxide ineffective after 24 hours).  Dose:  10 mg     magnesium hydroxide 400 MG/5ML Susp  Commonly known as:  MILK OF MAGNESIA   Take 30 mL by mouth 1 time daily as needed (if polyethylene glycol ineffective after 24 hours).  Dose:  30 mL     NS SOLN 100 mL with cefTRIAXone 2 GM SOLR 2 g   2 g by Intravenous route every 24 hours.  Dose:  2 g     oxyCODONE immediate-release 5 MG Tabs  Commonly known as:  ROXICODONE   Take 0.5-1 Tabs by mouth every 6 hours as needed for Severe Pain for up to 3 days.  Dose:  2.5-5 mg     polyethylene glycol/lytes Pack  Commonly known as:  MIRALAX   Take 1 Packet by mouth 1 time daily as needed (if sennosides and docusate ineffective after 24 hours).  Dose:  17 g     senna-docusate 8.6-50 MG Tabs  Commonly known as:  PERICOLACE or SENOKOT S   Take 2 Tabs by mouth 2 Times a Day.  Dose:  2 Tab        CHANGE how you take these medications      Instructions   lisinopril 30 MG tablet  Start taking on:  11/22/2018  What changed:  medication strength  Commonly known as:  PRINIVIL, ZESTRIL   Take 2 Tabs by mouth every morning.  Dose:  60 mg        CONTINUE taking these medications      Instructions   albuterol 108 (90 Base) MCG/ACT Aers inhalation aerosol   Inhale 2 Puffs by mouth every 6 hours as needed for  Shortness of Breath.  Dose:  2 Puff     amLODIPine 5 MG Tabs  Commonly known as:  NORVASC   Take 1 Tab by mouth 2 Times a Day.  Dose:  5 mg     melatonin 3 MG Tabs   Take 3 mg by mouth every bedtime.  Dose:  3 mg     predniSONE 5 MG Tabs  Commonly known as:  DELTASONE   Take 1 Tab by mouth every morning with breakfast.  Dose:  5 mg     sertraline 100 MG Tabs  Commonly known as:  ZOLOFT   Take 2 Tabs by mouth every day.  Dose:  200 mg          Allergies  Allergies   Allergen Reactions   • Codeine Nausea     DIET  Orders Placed This Encounter   Procedures   • Diet Order Regular     Standing Status:   Standing     Number of Occurrences:   1     Order Specific Question:   Diet:     Answer:   Regular [1]     ACTIVITY  As tolerated and directed by skilled nursing.  Weight bearing as tolerated    LINES, DRAINS, AND WOUNDS  This is an automated list. Peripheral IVs will be removed prior to discharge.  Peripheral IV 11/20/18 20 G Left Forearm (Active)   Site Assessment Clean;Dry;Intact 11/20/2018  8:00 PM   Dressing Type Transparent 11/20/2018  8:00 PM   Line Status Blood return noted;Flushed;Saline locked 11/20/2018  8:00 PM   Dressing Status Clean;Dry;Intact 11/20/2018  8:00 PM   Dressing Intervention N/A 11/20/2018  8:00 PM   Infiltration Grading ([facility], [facility] 0 11/20/2018  8:00 PM   Phlebitis Scale ([facility] Only) 0 11/20/2018  8:00 PM     Urethral Catheter Straight-tip 16 Fr. (Active)   Site Assessment Clean;Skin intact 11/21/2018  4:00 AM   Collection Container Standard drainage bag 11/21/2018  4:00 AM   Urinary Catheter Care Drainage Tubing Properly Secured;Cath Care Done with Soap & Water;Drainage Bag Below Bladder Level and Not on Floor 11/21/2018  4:00 AM   Securement Method Securing device (Describe) 11/21/2018  4:00 AM      Wound 11/18/18 Skin Tear Arm;Elbow (Active)   Site Assessment CHU 11/21/2018  7:30 AM   Closure None 11/20/2018  8:00 PM   Drainage Amount None 11/21/2018  7:30 AM   Drainage Description  Sanguineous 11/18/2018  9:00 PM   Dressing Options Adhesive Foam 11/21/2018  7:30 AM   Dressing Changed Changed 11/19/2018  8:30 AM   Dressing Status Clean;Dry;Intact 11/21/2018  7:30 AM   Dressing Change Frequency As Needed 11/21/2018  7:30 AM       Peripheral IV 11/20/18 20 G Left Forearm (Active)   Site Assessment Clean;Dry;Intact 11/20/2018  8:00 PM   Dressing Type Transparent 11/20/2018  8:00 PM   Line Status Blood return noted;Flushed;Saline locked 11/20/2018  8:00 PM   Dressing Status Clean;Dry;Intact 11/20/2018  8:00 PM   Dressing Intervention N/A 11/20/2018  8:00 PM   Infiltration Grading ([facility] 0 11/20/2018  8:00 PM   Phlebitis Scale ([facility] Only) 0 11/20/2018  8:00 PM           Urethral Catheter Straight-tip 16 Fr. (Active)   Site Assessment Clean;Skin intact 11/21/2018  4:00 AM   Collection Container Standard drainage bag 11/21/2018  4:00 AM   Urinary Catheter Care Drainage Tubing Properly Secured;Cath Care Done with Soap & Water;Drainage Bag Below Bladder Level and Not on Floor 11/21/2018  4:00 AM   Securement Method Securing device (Describe) 11/21/2018  4:00 AM      MENTAL STATUS ON TRANSFER  Level of Consciousness: Alert  Orientation : Oriented x 4  Speech: Speech Clear    CONSULTATIONS  Orthopedic surgery    PROCEDURES  Cephalomedullary hip nailing on 11/18/18 by Dr. Middleton    LABORATORY  Lab Results   Component Value Date    SODIUM 140 11/21/2018    POTASSIUM 3.7 11/21/2018    CHLORIDE 106 11/21/2018    CO2 28 11/21/2018    GLUCOSE 170 (H) 11/21/2018    BUN 13 11/21/2018    CREATININE 0.80 11/21/2018    CREATININE 1.22 (H) 11/03/2012      Lab Results   Component Value Date    WBC 12.0 (H) 11/21/2018    WBC 7.0 06/26/2012    HEMOGLOBIN 11.7 (L) 11/21/2018    HEMATOCRIT 34.0 (L) 11/21/2018    PLATELETCT 229 11/21/2018      Total time of the discharge process exceeds 45 minutes.

## 2018-11-21 NOTE — DISCHARGE PLANNING
Anticipated Discharge Disposition: SNF    Action: LSW met w/ pt and pt's family at bedside. Pt provided this LSW w/ choice for SNF. 1) [facility], 2) [facility], 3) [facility]. [facility] notified. Pt is m/c.    Barriers to Discharge: None    Plan: Pending acceptance to SNF.

## 2018-11-22 NOTE — PROGRESS NOTES
Pt discharged to [facility] today. Report given to Yocasta at [facility]. Pt left with IV intact for dose of Rocephin. Pt left unit in a wheelchair escorted by CNA from [facility], accompanied by family. Discussed discharge paperwork with granddaughter, who also signed paperwork.

## 2018-12-11 ENCOUNTER — APPOINTMENT (OUTPATIENT)
Dept: RADIOLOGY | Facility: MEDICAL CENTER | Age: 83
DRG: 481 | End: 2018-12-11
Attending: EMERGENCY MEDICINE
Payer: MEDICARE

## 2018-12-11 ENCOUNTER — APPOINTMENT (OUTPATIENT)
Dept: RADIOLOGY | Facility: MEDICAL CENTER | Age: 83
DRG: 481 | End: 2018-12-11
Attending: ORTHOPAEDIC SURGERY
Payer: MEDICARE

## 2018-12-11 ENCOUNTER — HOSPITAL ENCOUNTER (INPATIENT)
Facility: MEDICAL CENTER | Age: 83
LOS: 8 days | DRG: 481 | End: 2018-12-19
Attending: EMERGENCY MEDICINE | Admitting: FAMILY MEDICINE
Payer: MEDICARE
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
## 2018-12-11 DIAGNOSIS — G89.18 POSTOPERATIVE PAIN: ICD-10-CM

## 2018-12-11 DIAGNOSIS — S72.009A CLOSED FRACTURE OF HIP, UNSPECIFIED LATERALITY, INITIAL ENCOUNTER (HCC): ICD-10-CM

## 2018-12-11 DIAGNOSIS — S72.142D CLOSED DISPLACED INTERTROCHANTERIC FRACTURE OF LEFT FEMUR WITH ROUTINE HEALING, SUBSEQUENT ENCOUNTER: ICD-10-CM

## 2018-12-11 DIAGNOSIS — S72.141A CLOSED DISPLACED INTERTROCHANTERIC FRACTURE OF RIGHT FEMUR, INITIAL ENCOUNTER (HCC): Chronic | ICD-10-CM

## 2018-12-11 DIAGNOSIS — R54 FRAILTY SYNDROME IN GERIATRIC PATIENT: ICD-10-CM

## 2018-12-11 DIAGNOSIS — E87.6 HYPOKALEMIA: ICD-10-CM

## 2018-12-11 PROBLEM — E11.29 TYPE 2 DIABETES MELLITUS WITH KIDNEY COMPLICATION, WITHOUT LONG-TERM CURRENT USE OF INSULIN (HCC): Status: ACTIVE | Noted: 2018-12-11

## 2018-12-11 PROBLEM — R29.6 FREQUENT FALLS: Status: ACTIVE | Noted: 2018-12-11

## 2018-12-11 LAB
ALBUMIN SERPL BCP-MCNC: 3.4 G/DL (ref 3.2–4.9)
ALBUMIN/GLOB SERPL: 1.2 G/DL
ALP SERPL-CCNC: 113 U/L (ref 30–99)
ALT SERPL-CCNC: 11 U/L (ref 2–50)
ANION GAP SERPL CALC-SCNC: 6 MMOL/L (ref 0–11.9)
AST SERPL-CCNC: 11 U/L (ref 12–45)
BASOPHILS # BLD AUTO: 0.4 % (ref 0–1.8)
BASOPHILS # BLD: 0.03 K/UL (ref 0–0.12)
BILIRUB SERPL-MCNC: 0.5 MG/DL (ref 0.1–1.5)
BUN SERPL-MCNC: 13 MG/DL (ref 8–22)
CALCIUM SERPL-MCNC: 9.1 MG/DL (ref 8.5–10.5)
CHLORIDE SERPL-SCNC: 98 MMOL/L (ref 96–112)
CO2 SERPL-SCNC: 34 MMOL/L (ref 20–33)
CREAT SERPL-MCNC: 0.97 MG/DL (ref 0.5–1.4)
EOSINOPHIL # BLD AUTO: 0.14 K/UL (ref 0–0.51)
EOSINOPHIL NFR BLD: 1.9 % (ref 0–6.9)
ERYTHROCYTE [DISTWIDTH] IN BLOOD BY AUTOMATED COUNT: 46.3 FL (ref 35.9–50)
GLOBULIN SER CALC-MCNC: 2.8 G/DL (ref 1.9–3.5)
GLUCOSE SERPL-MCNC: 153 MG/DL (ref 65–99)
HCT VFR BLD AUTO: 36.5 % (ref 37–47)
HGB BLD-MCNC: 12 G/DL (ref 12–16)
IMM GRANULOCYTES # BLD AUTO: 0.11 K/UL (ref 0–0.11)
IMM GRANULOCYTES NFR BLD AUTO: 1.5 % (ref 0–0.9)
INR PPP: 1.02 (ref 0.87–1.13)
LYMPHOCYTES # BLD AUTO: 1.9 K/UL (ref 1–4.8)
LYMPHOCYTES NFR BLD: 25.3 % (ref 22–41)
MAGNESIUM SERPL-MCNC: 2 MG/DL (ref 1.5–2.5)
MCH RBC QN AUTO: 29.7 PG (ref 27–33)
MCHC RBC AUTO-ENTMCNC: 32.9 G/DL (ref 33.6–35)
MCV RBC AUTO: 90.3 FL (ref 81.4–97.8)
MONOCYTES # BLD AUTO: 0.49 K/UL (ref 0–0.85)
MONOCYTES NFR BLD AUTO: 6.5 % (ref 0–13.4)
NEUTROPHILS # BLD AUTO: 4.85 K/UL (ref 2–7.15)
NEUTROPHILS NFR BLD: 64.4 % (ref 44–72)
NRBC # BLD AUTO: 0 K/UL
NRBC BLD-RTO: 0 /100 WBC
PLATELET # BLD AUTO: 298 K/UL (ref 164–446)
PMV BLD AUTO: 10 FL (ref 9–12.9)
POTASSIUM SERPL-SCNC: 3.3 MMOL/L (ref 3.6–5.5)
PROT SERPL-MCNC: 6.2 G/DL (ref 6–8.2)
PROTHROMBIN TIME: 13.5 SEC (ref 12–14.6)
RBC # BLD AUTO: 4.04 M/UL (ref 4.2–5.4)
SODIUM SERPL-SCNC: 138 MMOL/L (ref 135–145)
WBC # BLD AUTO: 7.5 K/UL (ref 4.8–10.8)

## 2018-12-11 PROCEDURE — 700101 HCHG RX REV CODE 250

## 2018-12-11 PROCEDURE — 73552 X-RAY EXAM OF FEMUR 2/>: CPT | Mod: RT

## 2018-12-11 PROCEDURE — 500891 HCHG PACK, ORTHO MAJOR: Performed by: ORTHOPAEDIC SURGERY

## 2018-12-11 PROCEDURE — 99285 EMERGENCY DEPT VISIT HI MDM: CPT

## 2018-12-11 PROCEDURE — 0QS636Z REPOSITION RIGHT UPPER FEMUR WITH INTRAMEDULLARY INTERNAL FIXATION DEVICE, PERCUTANEOUS APPROACH: ICD-10-PCS | Performed by: ORTHOPAEDIC SURGERY

## 2018-12-11 PROCEDURE — 700111 HCHG RX REV CODE 636 W/ 250 OVERRIDE (IP): Performed by: EMERGENCY MEDICINE

## 2018-12-11 PROCEDURE — 160022 HCHG BLOCK: Performed by: ORTHOPAEDIC SURGERY

## 2018-12-11 PROCEDURE — 770001 HCHG ROOM/CARE - MED/SURG/GYN PRIV*

## 2018-12-11 PROCEDURE — 96376 TX/PRO/DX INJ SAME DRUG ADON: CPT

## 2018-12-11 PROCEDURE — 96374 THER/PROPH/DIAG INJ IV PUSH: CPT

## 2018-12-11 PROCEDURE — C1769 GUIDE WIRE: HCPCS | Performed by: ORTHOPAEDIC SURGERY

## 2018-12-11 PROCEDURE — 85610 PROTHROMBIN TIME: CPT

## 2018-12-11 PROCEDURE — 85025 COMPLETE CBC W/AUTO DIFF WBC: CPT

## 2018-12-11 PROCEDURE — 304561 HCHG STAT O2

## 2018-12-11 PROCEDURE — 501838 HCHG SUTURE GENERAL: Performed by: ORTHOPAEDIC SURGERY

## 2018-12-11 PROCEDURE — 160041 HCHG SURGERY MINUTES - EA ADDL 1 MIN LEVEL 4: Performed by: ORTHOPAEDIC SURGERY

## 2018-12-11 PROCEDURE — 700111 HCHG RX REV CODE 636 W/ 250 OVERRIDE (IP): Performed by: FAMILY MEDICINE

## 2018-12-11 PROCEDURE — 83735 ASSAY OF MAGNESIUM: CPT

## 2018-12-11 PROCEDURE — 80053 COMPREHEN METABOLIC PANEL: CPT

## 2018-12-11 PROCEDURE — 500424 HCHG DRESSING, AIRSTRIP: Performed by: ORTHOPAEDIC SURGERY

## 2018-12-11 PROCEDURE — 73502 X-RAY EXAM HIP UNI 2-3 VIEWS: CPT | Mod: RT

## 2018-12-11 PROCEDURE — 160035 HCHG PACU - 1ST 60 MINS PHASE I: Performed by: ORTHOPAEDIC SURGERY

## 2018-12-11 PROCEDURE — 99222 1ST HOSP IP/OBS MODERATE 55: CPT | Mod: AI | Performed by: FAMILY MEDICINE

## 2018-12-11 PROCEDURE — C1713 ANCHOR/SCREW BN/BN,TIS/BN: HCPCS | Performed by: ORTHOPAEDIC SURGERY

## 2018-12-11 PROCEDURE — 36415 COLL VENOUS BLD VENIPUNCTURE: CPT

## 2018-12-11 PROCEDURE — 160009 HCHG ANES TIME/MIN: Performed by: ORTHOPAEDIC SURGERY

## 2018-12-11 PROCEDURE — 700111 HCHG RX REV CODE 636 W/ 250 OVERRIDE (IP)

## 2018-12-11 PROCEDURE — 160002 HCHG RECOVERY MINUTES (STAT): Performed by: ORTHOPAEDIC SURGERY

## 2018-12-11 PROCEDURE — 160048 HCHG OR STATISTICAL LEVEL 1-5: Performed by: ORTHOPAEDIC SURGERY

## 2018-12-11 PROCEDURE — 160029 HCHG SURGERY MINUTES - 1ST 30 MINS LEVEL 4: Performed by: ORTHOPAEDIC SURGERY

## 2018-12-11 PROCEDURE — 96375 TX/PRO/DX INJ NEW DRUG ADDON: CPT

## 2018-12-11 PROCEDURE — 73700 CT LOWER EXTREMITY W/O DYE: CPT | Mod: RT

## 2018-12-11 PROCEDURE — 160036 HCHG PACU - EA ADDL 30 MINS PHASE I: Performed by: ORTHOPAEDIC SURGERY

## 2018-12-11 PROCEDURE — 72170 X-RAY EXAM OF PELVIS: CPT

## 2018-12-11 PROCEDURE — 71045 X-RAY EXAM CHEST 1 VIEW: CPT

## 2018-12-11 DEVICE — IMPLANTABLE DEVICE: Type: IMPLANTABLE DEVICE | Site: HIP | Status: FUNCTIONAL

## 2018-12-11 DEVICE — SCREW KIT INTERTAN LAG 100/95 - COMPRESSION: Type: IMPLANTABLE DEVICE | Site: HIP | Status: FUNCTIONAL

## 2018-12-11 RX ORDER — AMOXICILLIN 250 MG
2 CAPSULE ORAL 2 TIMES DAILY
Status: DISCONTINUED | OUTPATIENT
Start: 2018-12-11 | End: 2018-12-19 | Stop reason: HOSPADM

## 2018-12-11 RX ORDER — ONDANSETRON 2 MG/ML
4 INJECTION INTRAMUSCULAR; INTRAVENOUS
Status: DISCONTINUED | OUTPATIENT
Start: 2018-12-11 | End: 2018-12-11 | Stop reason: HOSPADM

## 2018-12-11 RX ORDER — OXYCODONE HYDROCHLORIDE 10 MG/1
10 TABLET ORAL
Status: DISCONTINUED | OUTPATIENT
Start: 2018-12-11 | End: 2018-12-19 | Stop reason: HOSPADM

## 2018-12-11 RX ORDER — POLYETHYLENE GLYCOL 3350 17 G/17G
1 POWDER, FOR SOLUTION ORAL
Status: DISCONTINUED | OUTPATIENT
Start: 2018-12-11 | End: 2018-12-19 | Stop reason: HOSPADM

## 2018-12-11 RX ORDER — MORPHINE SULFATE 10 MG/ML
4 INJECTION, SOLUTION INTRAMUSCULAR; INTRAVENOUS ONCE
Status: COMPLETED | OUTPATIENT
Start: 2018-12-11 | End: 2018-12-11

## 2018-12-11 RX ORDER — ENALAPRILAT 1.25 MG/ML
1.25 INJECTION INTRAVENOUS EVERY 6 HOURS PRN
Status: DISCONTINUED | OUTPATIENT
Start: 2018-12-11 | End: 2018-12-19 | Stop reason: HOSPADM

## 2018-12-11 RX ORDER — SODIUM CHLORIDE, SODIUM LACTATE, POTASSIUM CHLORIDE, CALCIUM CHLORIDE 600; 310; 30; 20 MG/100ML; MG/100ML; MG/100ML; MG/100ML
INJECTION, SOLUTION INTRAVENOUS CONTINUOUS
Status: DISCONTINUED | OUTPATIENT
Start: 2018-12-11 | End: 2018-12-11 | Stop reason: HOSPADM

## 2018-12-11 RX ORDER — MORPHINE SULFATE 10 MG/ML
1 INJECTION, SOLUTION INTRAMUSCULAR; INTRAVENOUS
Status: DISCONTINUED | OUTPATIENT
Start: 2018-12-11 | End: 2018-12-11 | Stop reason: HOSPADM

## 2018-12-11 RX ORDER — OXYCODONE HYDROCHLORIDE 5 MG/1
5 TABLET ORAL
Status: DISCONTINUED | OUTPATIENT
Start: 2018-12-11 | End: 2018-12-19 | Stop reason: HOSPADM

## 2018-12-11 RX ORDER — BUPROPION HYDROCHLORIDE 100 MG/1
100 TABLET, EXTENDED RELEASE ORAL 2 TIMES DAILY
COMMUNITY

## 2018-12-11 RX ORDER — HEPARIN SODIUM 5000 [USP'U]/ML
5000 INJECTION, SOLUTION INTRAVENOUS; SUBCUTANEOUS EVERY 8 HOURS
Status: DISCONTINUED | OUTPATIENT
Start: 2018-12-11 | End: 2018-12-19 | Stop reason: HOSPADM

## 2018-12-11 RX ORDER — SODIUM CHLORIDE AND POTASSIUM CHLORIDE 150; 900 MG/100ML; MG/100ML
INJECTION, SOLUTION INTRAVENOUS CONTINUOUS
Status: DISCONTINUED | OUTPATIENT
Start: 2018-12-11 | End: 2018-12-12

## 2018-12-11 RX ORDER — ONDANSETRON 2 MG/ML
4 INJECTION INTRAMUSCULAR; INTRAVENOUS EVERY 4 HOURS PRN
Status: DISCONTINUED | OUTPATIENT
Start: 2018-12-11 | End: 2018-12-19 | Stop reason: HOSPADM

## 2018-12-11 RX ORDER — AMLODIPINE BESYLATE 5 MG/1
5 TABLET ORAL 2 TIMES DAILY
Status: DISCONTINUED | OUTPATIENT
Start: 2018-12-11 | End: 2018-12-19 | Stop reason: HOSPADM

## 2018-12-11 RX ORDER — ACETAMINOPHEN 325 MG/1
650 TABLET ORAL EVERY 6 HOURS PRN
Status: DISCONTINUED | OUTPATIENT
Start: 2018-12-11 | End: 2018-12-19 | Stop reason: HOSPADM

## 2018-12-11 RX ORDER — LANOLIN ALCOHOL/MO/W.PET/CERES
3 CREAM (GRAM) TOPICAL
Status: DISCONTINUED | OUTPATIENT
Start: 2018-12-11 | End: 2018-12-11

## 2018-12-11 RX ORDER — ALBUTEROL SULFATE 90 UG/1
2 AEROSOL, METERED RESPIRATORY (INHALATION) EVERY 6 HOURS PRN
Status: DISCONTINUED | OUTPATIENT
Start: 2018-12-11 | End: 2018-12-19 | Stop reason: HOSPADM

## 2018-12-11 RX ORDER — HYDROCHLOROTHIAZIDE 12.5 MG/1
12.5 CAPSULE, GELATIN COATED ORAL DAILY
COMMUNITY

## 2018-12-11 RX ORDER — MORPHINE SULFATE 10 MG/ML
4 INJECTION, SOLUTION INTRAMUSCULAR; INTRAVENOUS
Status: DISCONTINUED | OUTPATIENT
Start: 2018-12-11 | End: 2018-12-19 | Stop reason: HOSPADM

## 2018-12-11 RX ORDER — LABETALOL HYDROCHLORIDE 5 MG/ML
5 INJECTION, SOLUTION INTRAVENOUS
Status: DISCONTINUED | OUTPATIENT
Start: 2018-12-11 | End: 2018-12-11 | Stop reason: HOSPADM

## 2018-12-11 RX ORDER — LISINOPRIL 10 MG/1
30 TABLET ORAL EVERY MORNING
Status: DISCONTINUED | OUTPATIENT
Start: 2018-12-11 | End: 2018-12-11

## 2018-12-11 RX ORDER — SERTRALINE HYDROCHLORIDE 100 MG/1
200 TABLET, FILM COATED ORAL
Status: DISCONTINUED | OUTPATIENT
Start: 2018-12-11 | End: 2018-12-19 | Stop reason: HOSPADM

## 2018-12-11 RX ORDER — HYDROMORPHONE HYDROCHLORIDE 1 MG/ML
1 INJECTION, SOLUTION INTRAMUSCULAR; INTRAVENOUS; SUBCUTANEOUS ONCE
Status: COMPLETED | OUTPATIENT
Start: 2018-12-11 | End: 2018-12-11

## 2018-12-11 RX ORDER — MORPHINE SULFATE 10 MG/ML
2 INJECTION, SOLUTION INTRAMUSCULAR; INTRAVENOUS
Status: DISCONTINUED | OUTPATIENT
Start: 2018-12-11 | End: 2018-12-11 | Stop reason: HOSPADM

## 2018-12-11 RX ORDER — ACETAMINOPHEN 10 MG/ML
1000 INJECTION, SOLUTION INTRAVENOUS EVERY 6 HOURS
Status: COMPLETED | OUTPATIENT
Start: 2018-12-11 | End: 2018-12-12

## 2018-12-11 RX ORDER — ONDANSETRON 2 MG/ML
4 INJECTION INTRAMUSCULAR; INTRAVENOUS ONCE
Status: COMPLETED | OUTPATIENT
Start: 2018-12-11 | End: 2018-12-11

## 2018-12-11 RX ORDER — MORPHINE SULFATE 10 MG/ML
5 INJECTION, SOLUTION INTRAMUSCULAR; INTRAVENOUS
Status: DISCONTINUED | OUTPATIENT
Start: 2018-12-11 | End: 2018-12-11 | Stop reason: HOSPADM

## 2018-12-11 RX ORDER — BISACODYL 10 MG
10 SUPPOSITORY, RECTAL RECTAL
Status: DISCONTINUED | OUTPATIENT
Start: 2018-12-11 | End: 2018-12-19 | Stop reason: HOSPADM

## 2018-12-11 RX ORDER — PREDNISONE 5 MG/1
5 TABLET ORAL
Status: DISCONTINUED | OUTPATIENT
Start: 2018-12-11 | End: 2018-12-19 | Stop reason: HOSPADM

## 2018-12-11 RX ORDER — LISINOPRIL 20 MG/1
60 TABLET ORAL EVERY MORNING
Status: DISCONTINUED | OUTPATIENT
Start: 2018-12-11 | End: 2018-12-19 | Stop reason: HOSPADM

## 2018-12-11 RX ORDER — ONDANSETRON 4 MG/1
4 TABLET, ORALLY DISINTEGRATING ORAL EVERY 4 HOURS PRN
Status: DISCONTINUED | OUTPATIENT
Start: 2018-12-11 | End: 2018-12-19 | Stop reason: HOSPADM

## 2018-12-11 RX ADMIN — MORPHINE SULFATE 4 MG: 10 INJECTION INTRAVENOUS at 10:40

## 2018-12-11 RX ADMIN — MORPHINE SULFATE 4 MG: 10 INJECTION INTRAVENOUS at 05:59

## 2018-12-11 RX ADMIN — ACETAMINOPHEN 1000 MG: 10 INJECTION, SOLUTION INTRAVENOUS at 23:48

## 2018-12-11 RX ADMIN — ONDANSETRON 4 MG: 2 INJECTION INTRAMUSCULAR; INTRAVENOUS at 05:59

## 2018-12-11 RX ADMIN — HYDROMORPHONE HYDROCHLORIDE 1 MG: 1 INJECTION, SOLUTION INTRAMUSCULAR; INTRAVENOUS; SUBCUTANEOUS at 08:27

## 2018-12-11 RX ADMIN — FENTANYL CITRATE 100 MCG: 50 INJECTION, SOLUTION INTRAMUSCULAR; INTRAVENOUS at 06:56

## 2018-12-11 ASSESSMENT — ENCOUNTER SYMPTOMS
COUGH: 0
WHEEZING: 0
VOMITING: 0
FEVER: 0
ABDOMINAL PAIN: 0
NAUSEA: 0
SORE THROAT: 0
DIZZINESS: 0
BACK PAIN: 0
SHORTNESS OF BREATH: 0
WEAKNESS: 0
CHILLS: 0
HEARTBURN: 0
FALLS: 1
NECK PAIN: 0
DIARRHEA: 0
HEADACHES: 0
NERVOUS/ANXIOUS: 0
BLURRED VISION: 0

## 2018-12-11 ASSESSMENT — PAIN SCALES - GENERAL
PAINLEVEL_OUTOF10: 10
PAINLEVEL_OUTOF10: 3
PAINLEVEL_OUTOF10: 10
PAINLEVEL_OUTOF10: 10
PAINLEVEL_OUTOF10: 0
PAINLEVEL_OUTOF10: 3
PAINLEVEL_OUTOF10: 3
PAINLEVEL_OUTOF10: 10

## 2018-12-11 ASSESSMENT — COPD QUESTIONNAIRES
HAVE YOU SMOKED AT LEAST 100 CIGARETTES IN YOUR ENTIRE LIFE: NO/DON'T KNOW
COPD SCREENING SCORE: 2
DURING THE PAST 4 WEEKS HOW MUCH DID YOU FEEL SHORT OF BREATH: NONE/LITTLE OF THE TIME
DO YOU EVER COUGH UP ANY MUCUS OR PHLEGM?: NO/ONLY WITH OCCASIONAL COLDS OR INFECTIONS

## 2018-12-11 ASSESSMENT — PAIN DESCRIPTION - DESCRIPTORS: DESCRIPTORS: ACHING

## 2018-12-11 ASSESSMENT — LIFESTYLE VARIABLES: EVER_SMOKED: NEVER

## 2018-12-11 NOTE — ED NOTES
Pt continues to report RIGHT hip pain. Pt medicated with fentanyl (see MAR).   Family remains bedside.

## 2018-12-11 NOTE — ASSESSMENT & PLAN NOTE
S/P right hip nailing on 12/11  Continue heparin  Pain controlled.  PT/OT following.  Ortho following.  Patient wanting to go home and plan for hospice  PTOT following to ensure some improvement in functional mobility

## 2018-12-11 NOTE — ED NOTES
Patient resting.  Dr Middleton at bedside.  Waiting for CT scan.  CT called and aware of patient.  Family at bedside, updated on plan of care.

## 2018-12-11 NOTE — CONSULTS
Orthopaedic Surgery Consult Note:    Pardeep Middleton  Date & Time note created:    12/11/2018   12:21 PM     Referring MD:  Dr. Gill    Patient ID:   Name:             Laney Watt   YOB: 1921  Age:                 89 y.o.  female   MRN:               [id number]                                                             Reason for Consult:      Right hip pain    History of Present Illness:    89yo F with right hip pain after a ground level fall this morning at 0330.  The patient is well known to me due to an operation on her left hip.  She was doing well, and was discharged to home after failure to thrive at the rehab facility. She denies any other pain.  Her pain is exclusively in her right hip.      Review of Systems:      Constitutional: Denies fevers, Denies weight changes  Eyes: Denies changes in vision, no eye pain  Ears/Nose/Throat/Mouth: Denies nasal congestion or sore throat   Cardiovascular: Denies chest pain   Respiratory: Denies shortness of breath , Denies cough  Gastrointestinal/Hepatic: Denies abdominal pain, nausea, vomiting, diarrhea, constipation or GI bleeding   Genitourinary: Denies dysuria or frequency  Musculoskeletal/Rheum: right hip pain, no pain on left hip  Skin: Denies rash  Neurological: Denies headache, confusion, memory loss or focal weakness/parasthesias  Psychiatric: denies mood disorder   Endocrine: Stella thyroid problems  Heme/Oncology/Lymph Nodes: Denies enlarged lymph nodes, denies brusing or known bleeding disorder  All other systems were reviewed and are negative (AMA/CMS criteria)                Past Medical History:   Past Medical History:   Diagnosis Date   • Arthritis    • ASTHMA    • Depression    • Hypertension    • Hypoxia    • Insomnia      Active Hospital Problems    Diagnosis   • Closed intertrochanteric fracture of right femur (HCC) [S72.141A]     Priority: High   • Frailty syndrome in geriatric patient [R54]     Priority: High   • Frequent falls  [R29.6]     Priority: Medium   • Type 2 diabetes mellitus with kidney complication, without long-term current use of insulin ([facility]) [E11.29]     Priority: Medium   • Hypokalemia [E87.6]     Priority: Medium   • Closed intertrochanteric fracture of left femur ([facility]) [S72.142A]     Priority: Medium   • Urinary retention [R33.9]     Priority: Medium   • Chronic kidney disease, stage III (moderate) ([facility]) [N18.3]     Priority: Medium   • COPD (chronic obstructive pulmonary disease) ([facility]) [J44.9]     Priority: Medium   • HTN (hypertension) [I10]     Priority: Medium   • Vitamin D insufficiency [E55.9]     Priority: Medium   • Depression [F32.9]     Priority: Low       Past Surgical History:  Past Surgical History:   Procedure Laterality Date   • HIP NAILING INTRAMEDULLARY Left 11/18/2018    Procedure: HIP CEPHALOMEDULLARY NAIL;  Surgeon: Pardeep Middleton M.D.;  Location: SURGERY [facility];  Service: Orthopedics   • APPENDECTOMY     • HYSTERECTOMY, TOTAL ABDOMINAL      Complete, FREDDY/BSO   • TONSILLECTOMY         Hospital Medications:    Current Facility-Administered Medications:   •  albuterol inhaler 2 Puff, 2 Puff, Inhalation, Q6HRS PRN, Avi Reese M.D.  •  amLODIPine (NORVASC) tablet 5 mg, 5 mg, Oral, BID, Avi Reese M.D., Stopped at 12/11/18 0900  •  predniSONE (DELTASONE) tablet 5 mg, 5 mg, Oral, QDAY with Breakfast, Avi Reese M.D.  •  sertraline (ZOLOFT) tablet 200 mg, 200 mg, Oral, QDAY, Avi Reese M.D., Stopped at 12/11/18 0900  •  senna-docusate (PERICOLACE or SENOKOT S) 8.6-50 MG per tablet 2 Tab, 2 Tab, Oral, BID **AND** polyethylene glycol/lytes (MIRALAX) PACKET 1 Packet, 1 Packet, Oral, QDAY PRN **AND** magnesium hydroxide (MILK OF MAGNESIA) suspension 30 mL, 30 mL, Oral, QDAY PRN **AND** bisacodyl (DULCOLAX) suppository 10 mg, 10 mg, Rectal, QDAY PRN, Avi Reese M.D.  •  Respiratory Care per Protocol, , Nebulization, Continuous RT, Avi Reese M.D.  •  0.9 % NaCl  with KCl 20 mEq infusion, , Intravenous, Continuous, Avi Reese M.D.  •  heparin injection 5,000 Units, 5,000 Units, Subcutaneous, Q8HRS, Avi Reese M.D.  •  enalaprilat (VASOTEC) injection 1.25 mg, 1.25 mg, Intravenous, Q6HRS PRN, Avi Reese M.D.  •  ondansetron (ZOFRAN) syringe/vial injection 4 mg, 4 mg, Intravenous, Q4HRS PRN, Avi Reese M.D.  •  ondansetron (ZOFRAN ODT) dispertab 4 mg, 4 mg, Oral, Q4HRS PRN, Avi Reese M.D.  •  acetaminophen (TYLENOL) tablet 650 mg, 650 mg, Oral, Q6HRS PRN, Avi Reese M.D.  •  Notify provider if pain remains uncontrolled, , , CONTINUOUS **AND** Use the numeric rating scale (NRS-11) on regular floors and Critical-Care Pain Observation Tool (CPOT) on ICUs/Trauma to assess pain, , , CONTINUOUS **AND** Pulse Ox (Oximetry), , , CONTINUOUS **AND** Pharmacy Consult Request ...Pain Management Review, , Other, PRN **AND** If patient difficult to arouse and/or has respiratory depression, stop any opiates that are currently infusing and call a Rapid Response., , , CONTINUOUS **AND** oxyCODONE immediate-release (ROXICODONE) tablet 5 mg, 5 mg, Oral, Q3HRS PRN **AND** oxyCODONE immediate-release (ROXICODONE) tablet 10 mg, 10 mg, Oral, Q3HRS PRN **AND** morphine (pf) 10 mg/mL injection 4 mg, 4 mg, Intravenous, Q3HRS PRN, Avi Reese M.D., 4 mg at 12/11/18 1040  •  lisinopril (PRINIVIL) tablet 60 mg, 60 mg, Oral, QAMAvi M.D., Stopped at 12/11/18 0900  •  vitamin D (cholecalciferol) tablet 4,000 Units, 4,000 Units, Oral, DAILY, Avi Reese M.D., Stopped at 12/11/18 0930  •  potassium bicarbonate (KLYTE) effervescent tablet 50 mEq, 50 mEq, Oral, Once, Larissa Blount D.O.    Current Outpatient Prescriptions:   •  hydrochlorothiazide (MICROZIDE) 12.5 MG capsule, Take 12.5 mg by mouth every day., Disp: , Rfl:   •  buPROPion SR (WELLBUTRIN-SR) 100 MG TABLET SR 12 HR, Take 100 mg by mouth 2 times a day., Disp: , Rfl:   •  lisinopril  (PRINIVIL, ZESTRIL) 30 MG tablet, Take 2 Tabs by mouth every morning., Disp: , Rfl:   •  NS SOLN 100 mL with cefTRIAXone 2 GM SOLR 2 g, 2 g by Intravenous route every 24 hours., Disp: , Rfl:   •  senna-docusate (PERICOLACE OR SENOKOT S) 8.6-50 MG Tab, Take 2 Tabs by mouth 2 Times a Day., Disp: 30 Tab, Rfl: 0  •  polyethylene glycol/lytes (MIRALAX) Pack, Take 1 Packet by mouth 1 time daily as needed (if sennosides and docusate ineffective after 24 hours)., Disp: , Rfl: 3  •  albuterol 108 (90 Base) MCG/ACT Aero Soln inhalation aerosol, Inhale 2 Puffs by mouth every 6 hours as needed for Shortness of Breath., Disp: , Rfl:   •  melatonin 3 MG Tab, Take 3 mg by mouth every bedtime., Disp: , Rfl:   •  sertraline (ZOLOFT) 100 MG Tab, Take 2 Tabs by mouth every day., Disp: 180 Tab, Rfl: 3  •  predniSONE (DELTASONE) 5 MG Tab, Take 1 Tab by mouth every morning with breakfast., Disp: 90 Tab, Rfl: 3  •  amlodipine (NORVASC) 5 MG Tab, Take 1 Tab by mouth 2 Times a Day., Disp: 180 Tab, Rfl: 3    Current Outpatient Medications:    (Not in a hospital admission)    Medication Allergy:  Allergies   Allergen Reactions   • Codeine Nausea       Family History:  Family History   Problem Relation Age of Onset   • Diabetes Mother    • Asthma Father    • Diabetes Sister        Social History:  Social History     Social History   • Marital status:      Spouse name: N/A   • Number of children: N/A   • Years of education: N/A     Occupational History   • Not on file.     Social History Main Topics   • Smoking status: Former Smoker     Packs/day: 0.25     Years: 20.00     Types: Cigarettes     Quit date: 7/10/1965   • Smokeless tobacco: Former User     Quit date: 1/1/1965      Comment: 20 years, 1/2 ppd   • Alcohol use 8.4 oz/week     14 Glasses of wine per week      Comment: occasionally   • Drug use: No   • Sexual activity: No     Other Topics Concern   • Not on file     Social History Narrative   • No narrative on file  "        Physical Exam:  Vitals/ General Appearance:   Weight/BMI: Body mass index is 23.04 kg/m².  Blood pressure (!) 192/79, pulse 68, temperature 36 °C (96.8 °F), temperature source Temporal, resp. rate 15, height 1.6 m (5' 3\"), weight 59 kg (130 lb 1.1 oz), SpO2 94 %, not currently breastfeeding.  Vitals:    12/11/18 0815 12/11/18 0830 12/11/18 0915 12/11/18 1000   BP:       Pulse:  69  68   Resp: 17 20 12 15   Temp:       TempSrc:       SpO2: 94% 95% 91% 94%   Weight:       Height:           Constitutional:   Well developed, Well nourished, No acute distress  HENMT:  Normocephalic, Atraumatic, Oropharynx moist mucous membranes, No oral exudates, Nose normal.  No thyromegaly.  Eyes:  EOMI, Conjunctiva normal, No discharge.  Neck:  Normal range of motion, No cervical tenderness,  no JVD.  Cardiovascular:  Regular rate and rhythm  Lungs:  Normal breathing  Abdomen: Soft, non-tender, non-distended.  Skin: Warm, Dry, No erythema, No rash, no induration.  Neurologic: Alert & oriented x 3, No focal deficits noted, cranial nerves II through X are grossly intact.  Psychiatric: Affect normal, Judgment normal, Mood normal.  Musculoskeletal: right hip tender over trochanter.  +EHL/FHL/TA  SILT m/l/1st dws  wwp toes    Lab Data Review:  Recent Results (from the past 24 hour(s))   CBC WITH DIFFERENTIAL    Collection Time: 12/11/18  5:44 AM   Result Value Ref Range    WBC 7.5 4.8 - 10.8 K/uL    RBC 4.04 (L) 4.20 - 5.40 M/uL    Hemoglobin 12.0 12.0 - 16.0 g/dL    Hematocrit 36.5 (L) 37.0 - 47.0 %    MCV 90.3 81.4 - 97.8 fL    MCH 29.7 27.0 - 33.0 pg    MCHC 32.9 (L) 33.6 - 35.0 g/dL    RDW 46.3 35.9 - 50.0 fL    Platelet Count 298 164 - 446 K/uL    MPV 10.0 9.0 - 12.9 fL    Neutrophils-Polys 64.40 44.00 - 72.00 %    Lymphocytes 25.30 22.00 - 41.00 %    Monocytes 6.50 0.00 - 13.40 %    Eosinophils 1.90 0.00 - 6.90 %    Basophils 0.40 0.00 - 1.80 %    Immature Granulocytes 1.50 (H) 0.00 - 0.90 %    Nucleated RBC 0.00 /100 WBC    " Neutrophils (Absolute) 4.85 2.00 - 7.15 K/uL    Lymphs (Absolute) 1.90 1.00 - 4.80 K/uL    Monos (Absolute) 0.49 0.00 - 0.85 K/uL    Eos (Absolute) 0.14 0.00 - 0.51 K/uL    Baso (Absolute) 0.03 0.00 - 0.12 K/uL    Immature Granulocytes (abs) 0.11 0.00 - 0.11 K/uL    NRBC (Absolute) 0.00 K/uL   CMP    Collection Time: 12/11/18  5:44 AM   Result Value Ref Range    Sodium 138 135 - 145 mmol/L    Potassium 3.3 (L) 3.6 - 5.5 mmol/L    Chloride 98 96 - 112 mmol/L    Co2 34 (H) 20 - 33 mmol/L    Anion Gap 6.0 0.0 - 11.9    Glucose 153 (H) 65 - 99 mg/dL    Bun 13 8 - 22 mg/dL    Creatinine 0.97 0.50 - 1.40 mg/dL    Calcium 9.1 8.5 - 10.5 mg/dL    AST(SGOT) 11 (L) 12 - 45 U/L    ALT(SGPT) 11 2 - 50 U/L    Alkaline Phosphatase 113 (H) 30 - 99 U/L    Total Bilirubin 0.5 0.1 - 1.5 mg/dL    Albumin 3.4 3.2 - 4.9 g/dL    Total Protein 6.2 6.0 - 8.2 g/dL    Globulin 2.8 1.9 - 3.5 g/dL    A-G Ratio 1.2 g/dL   ESTIMATED GFR    Collection Time: 12/11/18  5:44 AM   Result Value Ref Range    GFR If African American >60 >60 mL/min/1.73 m 2    GFR If Non  53 (A) >60 mL/min/1.73 m 2   Prothrombin Time    Collection Time: 12/11/18  5:44 AM   Result Value Ref Range    PT 13.5 12.0 - 14.6 sec    INR 1.02 0.87 - 1.13       Imaging:   DX-CHEST-PORTABLE (1 VIEW)   Final Result      Minimal RIGHT lung base atelectasis.      DX-FEMUR-2+ RIGHT   Final Result            Acute displaced intertrochanteric fracture of the right femur.      DX-PELVIS-1 OR 2 VIEWS   Final Result         Acute displaced intertrochanteric fracture of the right femur.      CT-HIP W/O PLUS RECONS RIGHT    (Results Pending)       Assessment: 89yo F with basicervical right hip fracture    Plan: NPO for OR tonight.  Pain control  CT for basicervical vs femoral neck fracture  NWB RLE.  I discussed risks and benefits of surgery again with the patient, particularly damage to surrounding nerves, arteries, and veins.  Patient understands.  She is consentable, and  would like to continue with surgery.             Pardeep Middleton M.D.  [facility]

## 2018-12-11 NOTE — H&P
Hospital Medicine History & Physical Note    Date of Service  12/11/2018    Primary Care Physician  Roberto Cota M.D.    Consultants  Orthopedic surgery consulted    Code Status  DNR/DNI    Chief Complaint  Right hip pain    History of Presenting Illness  89 y.o. female who presented 12/11/2018 with right hip pain after a fall last night.  Patient had recent intertrochanteric fracture of the femur on the left about a month ago where she underwent IM nailing.  She was discharged to skilled nursing facility, apparently was not doing well with physical and occupational therapy, she was losing weight.  She was diagnosed with frailty syndrome and sent home on hospice.  Last night as she was getting up from the bed to use the bedside commode she did not ask for assistance from her daughter, and fell on her right side.  Workup here today shows intertrochanteric fracture of the right femur.  Orthopedic surgery has been consulted on the case.  She denies any dizziness or lightheadedness, chest pain palpitation shortness of breath prior to the fall.  They deny any recent fever chills, cough or congestion, abdominal pain nausea vomiting diarrhea or dysuria.    Review of Systems  Review of Systems   Constitutional: Negative for chills, fever and malaise/fatigue.   HENT: Negative for hearing loss and sore throat.    Eyes: Negative for blurred vision.   Respiratory: Negative for cough, shortness of breath and wheezing.    Cardiovascular: Negative for chest pain and leg swelling.   Gastrointestinal: Negative for abdominal pain, diarrhea, heartburn, nausea and vomiting.   Genitourinary: Negative for dysuria.   Musculoskeletal: Positive for joint pain. Negative for back pain and neck pain.   Skin: Negative for rash.   Neurological: Negative for dizziness, weakness and headaches.   Psychiatric/Behavioral: The patient is not nervous/anxious.        Past Medical History   has a past medical history of Arthritis; ASTHMA; Depression;  Hypertension; Hypoxia; and Insomnia.    Surgical History   has a past surgical history that includes hysterectomy, total abdominal; appendectomy; tonsillectomy; and hip nailing intramedullary (Left, 2018).     Family History  family history includes Asthma in her father; Diabetes in her mother and sister.     Social History   reports that she quit smoking about 53 years ago. Her smoking use included Cigarettes. She has a 5.00 pack-year smoking history. She quit smokeless tobacco use about 53 years ago. She reports that she drinks about 8.4 oz of alcohol per week . She reports that she does not use drugs.    Allergies  Allergies   Allergen Reactions   • Codeine Nausea       Medications  Prior to Admission Medications   Prescriptions Last Dose Informant Patient Reported? Taking?   NS SOLN 100 mL with cefTRIAXone 2 GM SOLR 2 g  Family Member No No   Si g by Intravenous route every 24 hours.   albuterol 108 (90 Base) MCG/ACT Aero Soln inhalation aerosol 12/10/2018 at 2000 Family Member Yes No   Sig: Inhale 2 Puffs by mouth every 6 hours as needed for Shortness of Breath.   amlodipine (NORVASC) 5 MG Tab 12/10/2018 at 1900 Family Member No No   Sig: Take 1 Tab by mouth 2 Times a Day.   buPROPion SR (WELLBUTRIN-SR) 100 MG TABLET SR 12 HR 12/10/2018 at PM Family Member Yes Yes   Sig: Take 100 mg by mouth 2 times a day.   hydrochlorothiazide (MICROZIDE) 12.5 MG capsule 12/10/2018 at AM Family Member Yes Yes   Sig: Take 12.5 mg by mouth every day.   lisinopril (PRINIVIL, ZESTRIL) 30 MG tablet 12/10/2018 at AM Family Member No No   Sig: Take 2 Tabs by mouth every morning.   melatonin 3 MG Tab 12/10/2018 at 1900 Family Member Yes No   Sig: Take 3 mg by mouth every bedtime.   polyethylene glycol/lytes (MIRALAX) Pack 12/10/2018 at AM Family Member No No   Sig: Take 1 Packet by mouth 1 time daily as needed (if sennosides and docusate ineffective after 24 hours).   predniSONE (DELTASONE) 5 MG Tab 12/10/2018 at AM Family  Member No No   Sig: Take 1 Tab by mouth every morning with breakfast.   senna-docusate (PERICOLACE OR SENOKOT S) 8.6-50 MG Tab 12/10/2018 at AM Family Member No No   Sig: Take 2 Tabs by mouth 2 Times a Day.   sertraline (ZOLOFT) 100 MG Tab 12/10/2018 at AM Family Member No No   Sig: Take 2 Tabs by mouth every day.      Facility-Administered Medications: None       Physical Exam  Temp:  [36 °C (96.8 °F)] 36 °C (96.8 °F)  Pulse:  [68-76] 69  Resp:  [7-20] 20  BP: (192)/(79) 192/79    Physical Exam   Constitutional: She is oriented to person, place, and time. She appears well-developed and well-nourished.   HENT:   Head: Normocephalic and atraumatic.   Eyes: Pupils are equal, round, and reactive to light. Conjunctivae are normal.   Neck: No tracheal deviation present. No thyromegaly present.   Cardiovascular: Normal rate and regular rhythm.    Pulmonary/Chest: Effort normal and breath sounds normal.   Abdominal: Soft. Bowel sounds are normal. She exhibits no distension. There is no tenderness.   Musculoskeletal: She exhibits edema.   Lymphadenopathy:     She has no cervical adenopathy.   Neurological: She is alert and oriented to person, place, and time.   Skin: Skin is warm and dry.   Nursing note and vitals reviewed.      Laboratory:  Recent Labs      12/11/18   0544   WBC  7.5   RBC  4.04*   HEMOGLOBIN  12.0   HEMATOCRIT  36.5*   MCV  90.3   MCH  29.7   MCHC  32.9*   RDW  46.3   PLATELETCT  298   MPV  10.0     Recent Labs      12/11/18   0544   SODIUM  138   POTASSIUM  3.3*   CHLORIDE  98   CO2  34*   GLUCOSE  153*   BUN  13   CREATININE  0.97   CALCIUM  9.1     Recent Labs      12/11/18   0544   ALTSGPT  11   ASTSGOT  11*   ALKPHOSPHAT  113*   TBILIRUBIN  0.5   GLUCOSE  153*     Recent Labs      12/11/18   0544   INR  1.02             No results for input(s): TROPONINI in the last 72 hours.    Urinalysis:    No results found     Imaging:  DX-FEMUR-2+ RIGHT   Final Result            Acute displaced intertrochanteric  fracture of the right femur.      DX-PELVIS-1 OR 2 VIEWS   Final Result         Acute displaced intertrochanteric fracture of the right femur.      DX-CHEST-PORTABLE (1 VIEW)    (Results Pending)         Assessment/Plan:  I anticipate this patient will require at least two midnights for appropriate medical management, necessitating inpatient admission.    * Closed intertrochanteric fracture of right femur (HCC)- (present on admission)   Assessment & Plan    Pain control  Bowel protocol  Encourage IS  Orthopedic surgery consulted     Frailty syndrome in geriatric patient- (present on admission)   Assessment & Plan    Consult geriatric medicine     Type 2 diabetes mellitus with kidney complication, without long-term current use of insulin ([facility])- (present on admission)   Assessment & Plan    Diet-controlled     Frequent falls- (present on admission)   Assessment & Plan    PT/OT  Check orthostatics  Medication review     Hypokalemia- (present on admission)   Assessment & Plan    IV KCl, follow BMP, magnesium and phosphorus     Closed intertrochanteric fracture of left femur ([facility])- (present on admission)   Assessment & Plan    s/p IM nailing 11/18/2018     Urinary retention- (present on admission)   Assessment & Plan    Bladder scan every shift     Chronic kidney disease, stage III (moderate) ([facility])- (present on admission)   Assessment & Plan    Follow BMP     Vitamin D insufficiency- (present on admission)   Assessment & Plan    Start replacement     HTN (hypertension)- (present on admission)   Assessment & Plan    Lisinopril, Norvasc     COPD (chronic obstructive pulmonary disease) ([facility])- (present on admission)   Assessment & Plan    RT protocol, encourage IS     Depression- (present on admission)   Assessment & Plan    Zoloft         VTE prophylaxis: Heparin

## 2018-12-11 NOTE — ED NOTES
Med rec complete per family at bedside with discharge medications from a facility   Allergies reviewed    Family stated the facility would not give her Wellbutrin 100 mg BID but stated she has at home and  Has been taking since she was released

## 2018-12-11 NOTE — ED TRIAGE NOTES
"Laney Watt  89 y.o. female  Chief Complaint   Patient presents with   • T-5000 GLF     Pt reports getting up this morning to use the restroom when she fell, hurting her right hip. -LOC   • Hip Pain     Right hip pain       Pt BIB EMS for above CC.   Pt received 100mcg fentanyl en route.   Pt also reports falling about 3 weeks ago, breaking her LEFT hip and having surgery. She states this hip pain feels similar.   Pt is alert and oriented, speaking in full sentences, follows commands and responds appropriately to questions. NAD. Resp are even and unlabored.   Blood drawn and tubed to lab.     Hx: HTN, COPD, Asthma, Home O2, Hospice    Blood Pressure : (!) 192/79, Pulse: 76, Respiration: 20, Temperature: 36 °C (96.8 °F), Height: 160 cm (5' 3\"), Weight: 59 kg (130 lb 1.1 oz), BMI (Calculated): 23.04, BSA (Calculated): 1.6, Pulse Oximetry: 93 %, O2 (LPM): 2, O2 Delivery: Nasal Cannula  "

## 2018-12-11 NOTE — DISCHARGE PLANNING
Vani from [facility] called inquiring if patient was here in the hospital.  Asks that [facility] be called when patient is ready for discharge and they will resume service with her.  Vani's number is [phone].  She will also check in with the family.

## 2018-12-11 NOTE — ASSESSMENT & PLAN NOTE
Nutrition following.   Encourage oral intake.  PT/OT following.  Anticipate home with hospice at d/c.

## 2018-12-11 NOTE — ED PROVIDER NOTES
ED Provider Note    ED Provider Note    Primary care provider: Roberto Cota M.D.  Means of arrival: EMS  History obtained from: Patient, Daughter  History limited by: None    CHIEF COMPLAINT  Chief Complaint   Patient presents with   • T-5000 GLF     Pt reports getting up this morning to use the restroom when she fell, hurting her right hip. -LOC   • Hip Pain     Right hip pain       HPI  Laney Watt is a 89 y.o. female who presents to the Emergency Department via EMS after a fall at home and now complaining of right hip pain.  Unfortunately, patient is just home from rehab she arrived home on Saturday.  She was there because she fractured her left hip and had a pin placed in it.  She had been doing well.  Her daughter is in town from [place] and being transition her to home.  She states she got up in the night, to use the commode which is right by her bed.  The previous night, she had alerted her daughter who is sleeping on a couch outside her room for help, but tonight she did not in the process of transitioning from bed to commode, she fell.  She denies having any preceding symptoms such as chest pain or shortness of breath, no lightheadedness.  Daughter reports that her appetite has been decreased but she has not been having any vomiting or nausea.  She has been constipated and they are treating her with MiraLAX and it has been working.  Patient is a hospice patient.  No recent fevers.  She denies any urinary complaints.  She is unable to walk, now that she is fallen due to right hip pain.    REVIEW OF SYSTEMS  Review of Systems   Constitutional: Negative for fever.   Respiratory: Negative for shortness of breath.    Cardiovascular: Negative for chest pain.   Gastrointestinal: Negative for abdominal pain, nausea and vomiting.   Musculoskeletal: Positive for falls and joint pain.   Neurological: Negative for dizziness and headaches.   All other systems reviewed and are negative.      PAST MEDICAL HISTORY    "has a past medical history of Arthritis; ASTHMA; Depression; Hypertension; Hypoxia; and Insomnia.    SURGICAL HISTORY   has a past surgical history that includes hysterectomy, total abdominal; appendectomy; tonsillectomy; and hip nailing intramedullary (Left, 11/18/2018).    SOCIAL HISTORY  Social History   Substance Use Topics   • Smoking status: Former Smoker     Packs/day: 0.25     Years: 20.00     Types: Cigarettes     Quit date: 7/10/1965   • Smokeless tobacco: Former User     Quit date: 1/1/1965      Comment: 20 years, 1/2 ppd   • Alcohol use 8.4 oz/week     14 Glasses of wine per week      Comment: occasionally      History   Drug Use No       FAMILY HISTORY  Family History   Problem Relation Age of Onset   • Diabetes Mother    • Asthma Father    • Diabetes Sister        CURRENT MEDICATIONS  Home Medications     Reviewed by Judith Bains (Pharmacy Tech) on 12/11/18 at 0842  Med List Status: Complete   Medication Last Dose Status   albuterol 108 (90 Base) MCG/ACT Aero Soln inhalation aerosol 12/10/2018 Active   amlodipine (NORVASC) 5 MG Tab 12/10/2018 Active   buPROPion SR (WELLBUTRIN-SR) 100 MG TABLET SR 12 HR 12/10/2018 Active   hydrochlorothiazide (MICROZIDE) 12.5 MG capsule 12/10/2018 Active   lisinopril (PRINIVIL, ZESTRIL) 30 MG tablet 12/10/2018 Active   melatonin 3 MG Tab 12/10/2018 Active   NS SOLN 100 mL with cefTRIAXone 2 GM SOLR 2 g  Active   polyethylene glycol/lytes (MIRALAX) Pack 12/10/2018 Active   predniSONE (DELTASONE) 5 MG Tab 12/10/2018 Active   senna-docusate (PERICOLACE OR SENOKOT S) 8.6-50 MG Tab 12/10/2018 Active   sertraline (ZOLOFT) 100 MG Tab 12/10/2018 Active                ALLERGIES  Allergies   Allergen Reactions   • Codeine Nausea       PHYSICAL EXAM  VITAL SIGNS: BP (!) 192/79   Pulse 65   Temp 36 °C (96.8 °F) (Temporal)   Resp (!) 26   Ht 1.6 m (5' 3\")   Wt 59 kg (130 lb 1.1 oz)   SpO2 98%   BMI 23.04 kg/m²   Vitals reviewed.  Constitutional: Patient is oriented " to person, place, and time. Appears well-developed and well-nourished. Moderate distress.    Head: Normocephalic and atraumatic.   Ears: Normal external ears bilaterally.   Mouth/Throat: Oropharynx is clear, dry mucous membranes and dry lips.    Eyes: Conjunctivae are normal.   Neck: Normal range of motion. Neck supple. No midline TTP.  Cardiovascular: Normal rate, regular rhythm and normal heart sounds. Normal peripheral pulses, bilateral LE.  Pulmonary/Chest: Effort normal and breath sounds normal. No respiratory distress, no wheezes, rhonchi, or rales.  Abdominal: Soft. Bowel sounds are normal. There is no tenderness. No rebound or guarding, or peritoneal signs.  Musculoskeletal: No edema.  Left lower extremity is nontender, no deformities and normal range of motion.  There is limited range of motion of the right lower extremity secondary to pain.  Palpation of the right lower extremity from ankle shin to knee nontender.  She does start to complain of tenderness along her mid femur up into her hip.  I cannot assess for shortening or rotation at this time secondary to patient positioning.   Neurological: No focal deficits.   Skin: Skin is warm and dry. No erythema. No pallor.   Psychiatric: Patient has a normal mood and affect.     LABS  Results for orders placed or performed during the hospital encounter of 12/11/18   CBC WITH DIFFERENTIAL   Result Value Ref Range    WBC 7.5 4.8 - 10.8 K/uL    RBC 4.04 (L) 4.20 - 5.40 M/uL    Hemoglobin 12.0 12.0 - 16.0 g/dL    Hematocrit 36.5 (L) 37.0 - 47.0 %    MCV 90.3 81.4 - 97.8 fL    MCH 29.7 27.0 - 33.0 pg    MCHC 32.9 (L) 33.6 - 35.0 g/dL    RDW 46.3 35.9 - 50.0 fL    Platelet Count 298 164 - 446 K/uL    MPV 10.0 9.0 - 12.9 fL    Neutrophils-Polys 64.40 44.00 - 72.00 %    Lymphocytes 25.30 22.00 - 41.00 %    Monocytes 6.50 0.00 - 13.40 %    Eosinophils 1.90 0.00 - 6.90 %    Basophils 0.40 0.00 - 1.80 %    Immature Granulocytes 1.50 (H) 0.00 - 0.90 %    Nucleated RBC 0.00  /100 WBC    Neutrophils (Absolute) 4.85 2.00 - 7.15 K/uL    Lymphs (Absolute) 1.90 1.00 - 4.80 K/uL    Monos (Absolute) 0.49 0.00 - 0.85 K/uL    Eos (Absolute) 0.14 0.00 - 0.51 K/uL    Baso (Absolute) 0.03 0.00 - 0.12 K/uL    Immature Granulocytes (abs) 0.11 0.00 - 0.11 K/uL    NRBC (Absolute) 0.00 K/uL   CMP   Result Value Ref Range    Sodium 138 135 - 145 mmol/L    Potassium 3.3 (L) 3.6 - 5.5 mmol/L    Chloride 98 96 - 112 mmol/L    Co2 34 (H) 20 - 33 mmol/L    Anion Gap 6.0 0.0 - 11.9    Glucose 153 (H) 65 - 99 mg/dL    Bun 13 8 - 22 mg/dL    Creatinine 0.97 0.50 - 1.40 mg/dL    Calcium 9.1 8.5 - 10.5 mg/dL    AST(SGOT) 11 (L) 12 - 45 U/L    ALT(SGPT) 11 2 - 50 U/L    Alkaline Phosphatase 113 (H) 30 - 99 U/L    Total Bilirubin 0.5 0.1 - 1.5 mg/dL    Albumin 3.4 3.2 - 4.9 g/dL    Total Protein 6.2 6.0 - 8.2 g/dL    Globulin 2.8 1.9 - 3.5 g/dL    A-G Ratio 1.2 g/dL   ESTIMATED GFR   Result Value Ref Range    GFR If African American >60 >60 mL/min/1.73 m 2    GFR If Non  53 (A) >60 mL/min/1.73 m 2   Prothrombin Time   Result Value Ref Range    PT 13.5 12.0 - 14.6 sec    INR 1.02 0.87 - 1.13       All labs reviewed by me.    RADIOLOGY  CT-HIP W/O PLUS RECONS RIGHT   Final Result      1.  Acute basicervical RIGHT proximal femur fracture extending into the intertrochanteric region with associated impaction and mild apex anterior angulation.   2.  No RIGHT hip dislocation.      DX-CHEST-PORTABLE (1 VIEW)   Final Result      Minimal RIGHT lung base atelectasis.      DX-FEMUR-2+ RIGHT   Final Result            Acute displaced intertrochanteric fracture of the right femur.      DX-PELVIS-1 OR 2 VIEWS   Final Result         Acute displaced intertrochanteric fracture of the right femur.        The radiologist's interpretation of all radiological studies have been reviewed by me.    COURSE & MEDICAL DECISION MAKING  Pertinent Labs & Imaging studies reviewed. (See chart for details)    Obtained and reviewed  past medical records.  Patient's last encounter was an admission to the hospital November 21 she fell, fracturing her left femoral neck.  Dr. Middleton, operated on her.  She is noted to have a UTI at the time.  She is also noted to be a DNAR, DNI.    6:12 AM - Patient seen and examined at bedside.  Daughters at the bedside.  Patient is laying on her right side, the hip that is painful, because it is more comfortable.  High concern for hip fracture.  Patient is otherwise had been doing well.  She is awake and alert.  It does not appear, that there was any head or neck injury at the time of the fall.     8:12 AM, discussed with Dr. Anne, hospitalist who agrees to admit the patient to their service.  Will contact orthopedics.    8:13 AM, patient reevaluated the bedside.  I have updated patient as well as her daughter regarding right sided intertrochanteric hip fracture.  They do not have significant preference regarding orthopedist but suggested [facility].  This is the physician who operated on her recently.    8:17 AM Dr. Middleton, orthopedics paged.    8:22 AM notified by unit clerk, but Dr. Middleton, is in surgery all day, left a message on his cell phone.  Will await response.    9 AM, no response from Dr. Middleton, will call orthopedist on call.    9:10 AM discussed with Dr. Merrill, orthopedist on-call, who agrees to see this patient for operative repair.  He suggests keeping the patient n.p.o. after 10:00 this morning, in anticipation of OR time this evening.    9:12 AM nurse updated on plan of care.    Patient in stable but guarded condition.    FINAL IMPRESSION  1. Fall, initial encounter    2. Closed fracture of hip, unspecified laterality, initial encounter ([facility])    3. Hypokalemia

## 2018-12-12 ENCOUNTER — APPOINTMENT (OUTPATIENT)
Dept: RADIOLOGY | Facility: MEDICAL CENTER | Age: 83
DRG: 481 | End: 2018-12-12
Attending: ORTHOPAEDIC SURGERY
Payer: MEDICARE

## 2018-12-12 PROBLEM — S72.141A CLOSED INTERTROCHANTERIC FRACTURE OF RIGHT FEMUR (HCC): Chronic | Status: ACTIVE | Noted: 2018-12-11

## 2018-12-12 LAB
ANION GAP SERPL CALC-SCNC: 3 MMOL/L (ref 0–11.9)
BASOPHILS # BLD AUTO: 0.3 % (ref 0–1.8)
BASOPHILS # BLD: 0.03 K/UL (ref 0–0.12)
BUN SERPL-MCNC: 16 MG/DL (ref 8–22)
CALCIUM SERPL-MCNC: 9.1 MG/DL (ref 8.5–10.5)
CHLORIDE SERPL-SCNC: 100 MMOL/L (ref 96–112)
CO2 SERPL-SCNC: 34 MMOL/L (ref 20–33)
CREAT SERPL-MCNC: 1.04 MG/DL (ref 0.5–1.4)
EOSINOPHIL # BLD AUTO: 0.02 K/UL (ref 0–0.51)
EOSINOPHIL NFR BLD: 0.2 % (ref 0–6.9)
ERYTHROCYTE [DISTWIDTH] IN BLOOD BY AUTOMATED COUNT: 48 FL (ref 35.9–50)
GLUCOSE SERPL-MCNC: 163 MG/DL (ref 65–99)
HCT VFR BLD AUTO: 32.8 % (ref 37–47)
HGB BLD-MCNC: 10.5 G/DL (ref 12–16)
IMM GRANULOCYTES # BLD AUTO: 0.14 K/UL (ref 0–0.11)
IMM GRANULOCYTES NFR BLD AUTO: 1.2 % (ref 0–0.9)
LYMPHOCYTES # BLD AUTO: 1.32 K/UL (ref 1–4.8)
LYMPHOCYTES NFR BLD: 11.4 % (ref 22–41)
MAGNESIUM SERPL-MCNC: 1.9 MG/DL (ref 1.5–2.5)
MCH RBC QN AUTO: 29.7 PG (ref 27–33)
MCHC RBC AUTO-ENTMCNC: 32 G/DL (ref 33.6–35)
MCV RBC AUTO: 92.7 FL (ref 81.4–97.8)
MONOCYTES # BLD AUTO: 0.84 K/UL (ref 0–0.85)
MONOCYTES NFR BLD AUTO: 7.2 % (ref 0–13.4)
NEUTROPHILS # BLD AUTO: 9.26 K/UL (ref 2–7.15)
NEUTROPHILS NFR BLD: 79.7 % (ref 44–72)
NRBC # BLD AUTO: 0 K/UL
NRBC BLD-RTO: 0 /100 WBC
PHOSPHATE SERPL-MCNC: 3.5 MG/DL (ref 2.5–4.5)
PLATELET # BLD AUTO: 252 K/UL (ref 164–446)
PMV BLD AUTO: 9.7 FL (ref 9–12.9)
POTASSIUM SERPL-SCNC: 4.1 MMOL/L (ref 3.6–5.5)
RBC # BLD AUTO: 3.54 M/UL (ref 4.2–5.4)
SODIUM SERPL-SCNC: 137 MMOL/L (ref 135–145)
VIT B12 SERPL-MCNC: 228 PG/ML (ref 211–911)
WBC # BLD AUTO: 11.6 K/UL (ref 4.8–10.8)

## 2018-12-12 PROCEDURE — 700111 HCHG RX REV CODE 636 W/ 250 OVERRIDE (IP): Performed by: FAMILY MEDICINE

## 2018-12-12 PROCEDURE — 770001 HCHG ROOM/CARE - MED/SURG/GYN PRIV*

## 2018-12-12 PROCEDURE — 51798 US URINE CAPACITY MEASURE: CPT

## 2018-12-12 PROCEDURE — 97166 OT EVAL MOD COMPLEX 45 MIN: CPT

## 2018-12-12 PROCEDURE — 82607 VITAMIN B-12: CPT

## 2018-12-12 PROCEDURE — 99232 SBSQ HOSP IP/OBS MODERATE 35: CPT | Performed by: HOSPITALIST

## 2018-12-12 PROCEDURE — A9270 NON-COVERED ITEM OR SERVICE: HCPCS | Performed by: FAMILY MEDICINE

## 2018-12-12 PROCEDURE — 97163 PT EVAL HIGH COMPLEX 45 MIN: CPT

## 2018-12-12 PROCEDURE — 36415 COLL VENOUS BLD VENIPUNCTURE: CPT

## 2018-12-12 PROCEDURE — G8978 MOBILITY CURRENT STATUS: HCPCS | Mod: CL

## 2018-12-12 PROCEDURE — 700105 HCHG RX REV CODE 258

## 2018-12-12 PROCEDURE — 83735 ASSAY OF MAGNESIUM: CPT

## 2018-12-12 PROCEDURE — 700102 HCHG RX REV CODE 250 W/ 637 OVERRIDE(OP): Performed by: FAMILY MEDICINE

## 2018-12-12 PROCEDURE — G8988 SELF CARE GOAL STATUS: HCPCS | Mod: CJ

## 2018-12-12 PROCEDURE — 73501 X-RAY EXAM HIP UNI 1 VIEW: CPT | Mod: RT

## 2018-12-12 PROCEDURE — 700101 HCHG RX REV CODE 250: Performed by: FAMILY MEDICINE

## 2018-12-12 PROCEDURE — 84100 ASSAY OF PHOSPHORUS: CPT

## 2018-12-12 PROCEDURE — 700111 HCHG RX REV CODE 636 W/ 250 OVERRIDE (IP): Performed by: ORTHOPAEDIC SURGERY

## 2018-12-12 PROCEDURE — G8987 SELF CARE CURRENT STATUS: HCPCS | Mod: CK

## 2018-12-12 PROCEDURE — 700111 HCHG RX REV CODE 636 W/ 250 OVERRIDE (IP): Performed by: NURSE PRACTITIONER

## 2018-12-12 PROCEDURE — G8979 MOBILITY GOAL STATUS: HCPCS | Mod: CJ

## 2018-12-12 PROCEDURE — 80048 BASIC METABOLIC PNL TOTAL CA: CPT

## 2018-12-12 PROCEDURE — 85025 COMPLETE CBC W/AUTO DIFF WBC: CPT

## 2018-12-12 RX ORDER — SODIUM CHLORIDE, SODIUM LACTATE, POTASSIUM CHLORIDE, CALCIUM CHLORIDE 600; 310; 30; 20 MG/100ML; MG/100ML; MG/100ML; MG/100ML
INJECTION, SOLUTION INTRAVENOUS
Status: COMPLETED
Start: 2018-12-12 | End: 2018-12-12

## 2018-12-12 RX ORDER — CYANOCOBALAMIN 1000 UG/ML
1000 INJECTION, SOLUTION INTRAMUSCULAR; SUBCUTANEOUS ONCE
Status: COMPLETED | OUTPATIENT
Start: 2018-12-12 | End: 2018-12-12

## 2018-12-12 RX ORDER — CHOLECALCIFEROL (VITAMIN D3) 125 MCG
1000 CAPSULE ORAL DAILY
Status: DISCONTINUED | OUTPATIENT
Start: 2018-12-13 | End: 2018-12-19 | Stop reason: HOSPADM

## 2018-12-12 RX ADMIN — SODIUM CHLORIDE, POTASSIUM CHLORIDE, SODIUM LACTATE AND CALCIUM CHLORIDE: 600; 310; 30; 20 INJECTION, SOLUTION INTRAVENOUS at 01:00

## 2018-12-12 RX ADMIN — VITAMIN D, TAB 1000IU (100/BT) 4000 UNITS: 25 TAB at 05:29

## 2018-12-12 RX ADMIN — AMLODIPINE BESYLATE 5 MG: 5 TABLET ORAL at 05:26

## 2018-12-12 RX ADMIN — AMLODIPINE BESYLATE 5 MG: 5 TABLET ORAL at 17:37

## 2018-12-12 RX ADMIN — STANDARDIZED SENNA CONCENTRATE AND DOCUSATE SODIUM 2 TABLET: 8.6; 5 TABLET, FILM COATED ORAL at 05:30

## 2018-12-12 RX ADMIN — POTASSIUM CHLORIDE AND SODIUM CHLORIDE: 900; 150 INJECTION, SOLUTION INTRAVENOUS at 05:16

## 2018-12-12 RX ADMIN — OXYCODONE HYDROCHLORIDE 5 MG: 5 TABLET ORAL at 11:25

## 2018-12-12 RX ADMIN — OXYCODONE HYDROCHLORIDE 5 MG: 5 TABLET ORAL at 20:09

## 2018-12-12 RX ADMIN — HEPARIN SODIUM 5000 UNITS: 5000 INJECTION, SOLUTION INTRAVENOUS; SUBCUTANEOUS at 14:50

## 2018-12-12 RX ADMIN — ACETAMINOPHEN 1000 MG: 10 INJECTION, SOLUTION INTRAVENOUS at 04:30

## 2018-12-12 RX ADMIN — LISINOPRIL 60 MG: 20 TABLET ORAL at 05:26

## 2018-12-12 RX ADMIN — HEPARIN SODIUM 5000 UNITS: 5000 INJECTION, SOLUTION INTRAVENOUS; SUBCUTANEOUS at 20:09

## 2018-12-12 RX ADMIN — ALBUTEROL SULFATE 2 PUFF: 90 AEROSOL, METERED RESPIRATORY (INHALATION) at 09:46

## 2018-12-12 RX ADMIN — OXYCODONE HYDROCHLORIDE 5 MG: 5 TABLET ORAL at 14:49

## 2018-12-12 RX ADMIN — SERTRALINE 200 MG: 100 TABLET, FILM COATED ORAL at 08:04

## 2018-12-12 RX ADMIN — CYANOCOBALAMIN 1000 MCG: 1000 INJECTION, SOLUTION INTRAMUSCULAR at 14:49

## 2018-12-12 RX ADMIN — STANDARDIZED SENNA CONCENTRATE AND DOCUSATE SODIUM 2 TABLET: 8.6; 5 TABLET, FILM COATED ORAL at 17:37

## 2018-12-12 RX ADMIN — HEPARIN SODIUM 5000 UNITS: 5000 INJECTION, SOLUTION INTRAVENOUS; SUBCUTANEOUS at 05:32

## 2018-12-12 RX ADMIN — PREDNISONE 5 MG: 5 TABLET ORAL at 08:04

## 2018-12-12 ASSESSMENT — COGNITIVE AND FUNCTIONAL STATUS - GENERAL
CLIMB 3 TO 5 STEPS WITH RAILING: TOTAL
TURNING FROM BACK TO SIDE WHILE IN FLAT BAD: A LOT
DRESSING REGULAR LOWER BODY CLOTHING: A LOT
SUGGESTED CMS G CODE MODIFIER DAILY ACTIVITY: CK
HELP NEEDED FOR BATHING: A LOT
HELP NEEDED FOR BATHING: A LITTLE
DAILY ACTIVITIY SCORE: 20
MOVING TO AND FROM BED TO CHAIR: UNABLE
SUGGESTED CMS G CODE MODIFIER MOBILITY: CK
MOVING FROM LYING ON BACK TO SITTING ON SIDE OF FLAT BED: A LOT
TOILETING: A LOT
MOVING FROM LYING ON BACK TO SITTING ON SIDE OF FLAT BED: A LOT
TOILETING: A LITTLE
TURNING FROM BACK TO SIDE WHILE IN FLAT BAD: A LITTLE
DRESSING REGULAR LOWER BODY CLOTHING: TOTAL
WALKING IN HOSPITAL ROOM: A LITTLE
SUGGESTED CMS G CODE MODIFIER DAILY ACTIVITY: CJ
MOBILITY SCORE: 8
SUGGESTED CMS G CODE MODIFIER MOBILITY: CM
STANDING UP FROM CHAIR USING ARMS: TOTAL
MOBILITY SCORE: 15
STANDING UP FROM CHAIR USING ARMS: A LOT
DRESSING REGULAR UPPER BODY CLOTHING: A LITTLE
WALKING IN HOSPITAL ROOM: TOTAL
CLIMB 3 TO 5 STEPS WITH RAILING: A LOT
MOVING TO AND FROM BED TO CHAIR: A LITTLE
DAILY ACTIVITIY SCORE: 16

## 2018-12-12 ASSESSMENT — PATIENT HEALTH QUESTIONNAIRE - PHQ9
9. THOUGHTS THAT YOU WOULD BE BETTER OFF DEAD, OR OF HURTING YOURSELF: NOT AT ALL
4. FEELING TIRED OR HAVING LITTLE ENERGY: SEVERAL DAYS
2. FEELING DOWN, DEPRESSED, IRRITABLE, OR HOPELESS: SEVERAL DAYS
SUM OF ALL RESPONSES TO PHQ9 QUESTIONS 1 AND 2: 1
SUM OF ALL RESPONSES TO PHQ QUESTIONS 1-9: 3
7. TROUBLE CONCENTRATING ON THINGS, SUCH AS READING THE NEWSPAPER OR WATCHING TELEVISION: NOT AT ALL
3. TROUBLE FALLING OR STAYING ASLEEP OR SLEEPING TOO MUCH: SEVERAL DAYS
5. POOR APPETITE OR OVEREATING: NOT AT ALL
1. LITTLE INTEREST OR PLEASURE IN DOING THINGS: NOT AT ALL
6. FEELING BAD ABOUT YOURSELF - OR THAT YOU ARE A FAILURE OR HAVE LET YOURSELF OR YOUR FAMILY DOWN: NOT AL ALL
8. MOVING OR SPEAKING SO SLOWLY THAT OTHER PEOPLE COULD HAVE NOTICED. OR THE OPPOSITE, BEING SO FIGETY OR RESTLESS THAT YOU HAVE BEEN MOVING AROUND A LOT MORE THAN USUAL: NOT AT ALL

## 2018-12-12 ASSESSMENT — ENCOUNTER SYMPTOMS
DOUBLE VISION: 0
CHILLS: 0
TINGLING: 0
NAUSEA: 0
DIZZINESS: 0
FOCAL WEAKNESS: 1
PALPITATIONS: 0
SENSORY CHANGE: 0
BLURRED VISION: 0
WEAKNESS: 1
COUGH: 0
CONSTIPATION: 0
SPEECH CHANGE: 0
FEVER: 0
ABDOMINAL PAIN: 0
TREMORS: 0
HEADACHES: 0
DIARRHEA: 0
VOMITING: 0
SHORTNESS OF BREATH: 0

## 2018-12-12 ASSESSMENT — GAIT ASSESSMENTS: GAIT LEVEL OF ASSIST: UNABLE TO PARTICIPATE

## 2018-12-12 ASSESSMENT — ACTIVITIES OF DAILY LIVING (ADL): TOILETING: REQUIRES ASSIST

## 2018-12-12 ASSESSMENT — LIFESTYLE VARIABLES
TOTAL SCORE: 0
HOW MANY TIMES IN THE PAST YEAR HAVE YOU HAD 5 OR MORE DRINKS IN A DAY: 0
EVER FELT BAD OR GUILTY ABOUT YOUR DRINKING: NO
ALCOHOL_USE: YES
CONSUMPTION TOTAL: NEGATIVE
HAVE YOU EVER FELT YOU SHOULD CUT DOWN ON YOUR DRINKING: NO
EVER HAD A DRINK FIRST THING IN THE MORNING TO STEADY YOUR NERVES TO GET RID OF A HANGOVER: NO
AVERAGE NUMBER OF DAYS PER WEEK YOU HAVE A DRINK CONTAINING ALCOHOL: 5
HAVE PEOPLE ANNOYED YOU BY CRITICIZING YOUR DRINKING: NO
ON A TYPICAL DAY WHEN YOU DRINK ALCOHOL HOW MANY DRINKS DO YOU HAVE: 1
EVER_SMOKED: YES
TOTAL SCORE: 0
TOTAL SCORE: 0

## 2018-12-12 ASSESSMENT — PAIN SCALES - GENERAL
PAINLEVEL_OUTOF10: 4
PAINLEVEL_OUTOF10: 0
PAINLEVEL_OUTOF10: 6
PAINLEVEL_OUTOF10: 5

## 2018-12-12 NOTE — PROGRESS NOTES
· 2 RN skin check complete with Lucinda.   · Devices in place: oxygen tubing.  · Skin assessed under devices intact.  · Sacrum intact. Small bruises to bilateral posterior calves. Small blanching red spot to L side of back (not over bony prominence).  · The following interventions in place: pillows for support and positioning.

## 2018-12-12 NOTE — PROGRESS NOTES
Received from recovery via bed to 320, awake and alert, pt oriented to self, re-orient to time and place, family at bedside, silver mepilex c/d/i to rt hip, CMS check positive RLE, SCDs on bilat. IV infusing LAC, reports minimal pain at this time

## 2018-12-12 NOTE — RESPIRATORY CARE
COPD EDUCATION by COPD CLINICAL EDUCATOR  12/12/2018 at 6:36 AM by Anika Mane     Patient reviewed by COPD education team. Patient does not qualify for COPD program.

## 2018-12-12 NOTE — OR NURSING
Pt alert and oriented x3. Pt disoriented to time. Pt reports pain tolerable and denies nausea. Pedal pulse palpable. CMS intact. Surgical drsg CDI. Ice pack in place.   Post-op Tylenol ordered by Dr. Middleton to minimize narcotic use. Waiting for medication to arrive.   Report called to MANUEL Siegel and pt transported via bed.   Family updated and waiting for pt in her room.

## 2018-12-12 NOTE — PROGRESS NOTES
Pt arrived to floor and was transferred to bed, CHG bath completed.   Pre-op called for pt and pt will be going to surgery now. Transport at bedside.  Daughter Yocasta at bedside and aware.

## 2018-12-12 NOTE — OR SURGEON
Immediate Post OP Note    PreOp Diagnosis: Right intertrochanteric basicervical hip fracture    PostOp Diagnosis: Right intertrochanteric basicervical hip fracture    Procedure(s):  HIP NAILING INTRAMEDULLARY - Wound Class: Clean    Surgeon(s):  Pardeep Middleton M.D.    Anesthesiologist/Type of Anesthesia:  Anesthesiologist: Stevie Poole M.D./General    Surgical Staff:  Assistant: Michael Jensen P.A.-C.  Circulator: Rickey Patel R.N.  Scrub Person: Sue Craven [place]: Tessa Jorgensen    Specimens removed if any:  * No specimens in log *    Estimated Blood Loss: 50cc    Findings: acute fracture    Complications: none        12/11/2018 8:41 PM Pardeep Middleton M.D.

## 2018-12-12 NOTE — PROGRESS NOTES
[facility] Hospitalist Progress Note    Date of Service: 2018    Chief Complaint  89 y.o. Female with h/o asthma, HTN, left hip fracture admitted 2018 with right hip pain.  Found to have right hip fracture.  S/P right hip nailing on .    Interval Problem Update  :  POD 1.  Pain controlled.  Very limited mobility of RLE.  PT/OT to follow.  Expresses desire to go home at time of discharge.  VSS.    Consultants/Specialty  Ortho    Disposition  TBD.        Review of Systems   Constitutional: Negative for chills and fever.   HENT: Negative for congestion.    Eyes: Negative for blurred vision and double vision.   Respiratory: Negative for cough and shortness of breath.    Cardiovascular: Negative for chest pain, palpitations and leg swelling.   Gastrointestinal: Negative for abdominal pain, constipation, diarrhea, nausea and vomiting.   Genitourinary: Negative for dysuria.   Musculoskeletal: Positive for joint pain.   Skin: Negative for itching and rash.   Neurological: Positive for focal weakness and weakness. Negative for dizziness, tingling, tremors, sensory change, speech change and headaches.      Physical Exam  Laboratory/Imaging   Hemodynamics  Temp (24hrs), Av.4 °C (97.6 °F), Min:36 °C (96.8 °F), Max:37.9 °C (100.2 °F)   Temperature: 36.1 °C (97 °F)  Pulse  Av.1  Min: 56  Max: 111 Heart Rate (Monitored): 89  Blood Pressure : 144/70, NIBP: (!) 171/75      Respiratory      Respiration: 16, Pulse Oximetry: 95 %, O2 Daily Delivery Respiratory : Silicone Nasal Cannula     Work Of Breathing / Effort: (P) Mild  RUL Breath Sounds: (P) Diminished, RML Breath Sounds: (P) Diminished, RLL Breath Sounds: (P) Diminished, GARY Breath Sounds: (P) Diminished, LLL Breath Sounds: (P) Diminished    Fluids    Intake/Output Summary (Last 24 hours) at 18 1229  Last data filed at 18 0900   Gross per 24 hour   Intake              440 ml   Output              825 ml   Net             -385 ml        Nutrition  Orders Placed This Encounter   Procedures   • Diet Order Regular     Standing Status:   Standing     Number of Occurrences:   1     Order Specific Question:   Diet:     Answer:   Regular [1]     Physical Exam   Constitutional: She is oriented to person, place, and time. No distress.   Elderly female.   HENT:   Head: Normocephalic and atraumatic.   Mouth/Throat: No oropharyngeal exudate.   Eyes: Conjunctivae are normal. Right eye exhibits no discharge. Left eye exhibits no discharge.   Neck: Normal range of motion. No tracheal deviation present.   Cardiovascular: Normal rate, regular rhythm, normal heart sounds and intact distal pulses.    No murmur heard.  Pulmonary/Chest: Effort normal and breath sounds normal. No stridor. No respiratory distress. She has no wheezes.   Abdominal: Soft. Bowel sounds are normal. She exhibits no distension. There is no tenderness.   Musculoskeletal: She exhibits no edema.   RLE weakness.   Neurological: She is alert and oriented to person, place, and time. No cranial nerve deficit.   Skin: Skin is warm and dry. No rash noted. She is not diaphoretic. No erythema.   Psychiatric: She has a normal mood and affect.   Nursing note and vitals reviewed.      Recent Labs      12/11/18   0544  12/12/18   0526   WBC  7.5  11.6*   RBC  4.04*  3.54*   HEMOGLOBIN  12.0  10.5*   HEMATOCRIT  36.5*  32.8*   MCV  90.3  92.7   MCH  29.7  29.7   MCHC  32.9*  32.0*   RDW  46.3  48.0   PLATELETCT  298  252   MPV  10.0  9.7     Recent Labs      12/11/18   0544  12/12/18   0526   SODIUM  138  137   POTASSIUM  3.3*  4.1   CHLORIDE  98  100   CO2  34*  34*   GLUCOSE  153*  163*   BUN  13  16   CREATININE  0.97  1.04   CALCIUM  9.1  9.1     Recent Labs      12/11/18   0544   INR  1.02                  Assessment/Plan     * Closed intertrochanteric fracture of right femur (HCC)- (present on admission)   Assessment & Plan    S/P right hip nailing on 12/11  Continue heparin  Pain control  PT/OT to  follow   Ortho following.       Frailty syndrome in geriatric patient- (present on admission)   Assessment & Plan    Encourage oral intake.  PT/OT to follow.     Type 2 diabetes mellitus with kidney complication, without long-term current use of insulin ([facility])- (present on admission)   Assessment & Plan    Diet-controlled     Frequent falls- (present on admission)   Assessment & Plan    PT/OT to follow     Hypokalemia- (present on admission)   Assessment & Plan    Replaced.       Closed intertrochanteric fracture of left femur ([facility])- (present on admission)   Assessment & Plan    s/p IM nailing 11/18/2018  PT/OT     Urinary retention- (present on admission)   Assessment & Plan    Avery catheter removed.  Bladder scan as needed for retention.     Chronic kidney disease, stage III (moderate) ([facility])- (present on admission)   Assessment & Plan    Follow BMP     Vitamin D insufficiency- (present on admission)   Assessment & Plan    Oral replacement.     HTN (hypertension)- (present on admission)   Assessment & Plan    Continue Lisinopril, Norvasc     COPD (chronic obstructive pulmonary disease) ([facility])- (present on admission)   Assessment & Plan    No acute exacerbation.  RT protocol, encourage IS     Depression- (present on admission)   Assessment & Plan    Zoloft       Quality-Core Measures   Reviewed items::  Radiology images reviewed, Labs reviewed and Medications reviewed  Avery catheter::  No Avery  DVT prophylaxis pharmacological::  Heparin

## 2018-12-12 NOTE — PROGRESS NOTES
"   Orthopaedic PA Progress Note    Interval changes: feels well, resting in bed, did well overnight, states she is eager to go home    ROS - Patient denies any new issues. No chest pain, dyspnea, or fever.  Pain well controlled.    Blood pressure 136/72, pulse 75, temperature 36.4 °C (97.5 °F), temperature source Temporal, resp. rate 15, height 1.6 m (5' 3\"), weight 59 kg (130 lb 1.1 oz), SpO2 96 %, not currently breastfeeding.    Patient seen and examined  No acute distress  Breathing non labored  RRR  Surgical dressing is clean, dry, and intact. Patient clearly fires tibialis anterior, EHL, and gastrocnemius/soleus. Sensation is intact to light touch throughout superficial peroneal, deep peroneal, tibial, saphenous, and sural nerve distributions. Strong and palpable 2+ dorsalis pedis and posterior tibial pulses with capillary refill less than 2 seconds. No lower leg tenderness or discomfort.    Recent Labs      12/11/18   0544  12/12/18   0526   WBC  7.5  11.6*   RBC  4.04*  3.54*   HEMOGLOBIN  12.0  10.5*   HEMATOCRIT  36.5*  32.8*   MCV  90.3  92.7   MCH  29.7  29.7   MCHC  32.9*  32.0*   RDW  46.3  48.0   PLATELETCT  298  252   MPV  10.0  9.7       Active Hospital Problems    Diagnosis   • Closed intertrochanteric fracture of right femur ([facility]) [S72.141A]     Priority: High   • Frailty syndrome in geriatric patient [R54]     Priority: High   • Frequent falls [R29.6]     Priority: Medium   • Type 2 diabetes mellitus with kidney complication, without long-term current use of insulin ([facility]) [E11.29]     Priority: Medium   • Hypokalemia [E87.6]     Priority: Medium   • Closed intertrochanteric fracture of left femur ([facility]) [S72.142A]     Priority: Medium   • Urinary retention [R33.9]     Priority: Medium   • Chronic kidney disease, stage III (moderate) ([facility]) [N18.3]     Priority: Medium   • COPD (chronic obstructive pulmonary disease) ([facility]) [J44.9]     Priority: Medium   • HTN (hypertension) [I10]     Priority: Medium "   • Vitamin D insufficiency [E55.9]     Priority: Medium   • Depression [F32.9]     Priority: Low       Assessment/Plan:  POD#1 S/P Rt hip nail  Wt bearing status - as tolerated  PT/OT-initiated  Wound care:dresasing CDI, change in 7 days  Drains - no  Avery-may DC  Sutures/Staples out- 10-14 days post operatively  Antibiotics: complete  DVT Prophylaxis- TEDS/SCDs/Foot pumps.   UFH 5000u q 8hrs  Future Procedures - not anticipated  Case Coordination for Discharge Planning - Disposition SNf vs home, anticipated good recovery

## 2018-12-13 PROBLEM — E53.8 FOLIC ACID DEFICIENCY: Status: ACTIVE | Noted: 2018-12-13

## 2018-12-13 LAB
ANION GAP SERPL CALC-SCNC: 2 MMOL/L (ref 0–11.9)
BUN SERPL-MCNC: 14 MG/DL (ref 8–22)
CALCIUM SERPL-MCNC: 9 MG/DL (ref 8.5–10.5)
CHLORIDE SERPL-SCNC: 98 MMOL/L (ref 96–112)
CO2 SERPL-SCNC: 33 MMOL/L (ref 20–33)
CREAT SERPL-MCNC: 0.8 MG/DL (ref 0.5–1.4)
ERYTHROCYTE [DISTWIDTH] IN BLOOD BY AUTOMATED COUNT: 45.6 FL (ref 35.9–50)
FOLATE SERPL-MCNC: 3 NG/ML
GLUCOSE SERPL-MCNC: 152 MG/DL (ref 65–99)
HCT VFR BLD AUTO: 29.6 % (ref 37–47)
HGB BLD-MCNC: 10 G/DL (ref 12–16)
MCH RBC QN AUTO: 30.2 PG (ref 27–33)
MCHC RBC AUTO-ENTMCNC: 33.8 G/DL (ref 33.6–35)
MCV RBC AUTO: 89.4 FL (ref 81.4–97.8)
PLATELET # BLD AUTO: 205 K/UL (ref 164–446)
PMV BLD AUTO: 9.7 FL (ref 9–12.9)
POTASSIUM SERPL-SCNC: 3.5 MMOL/L (ref 3.6–5.5)
RBC # BLD AUTO: 3.31 M/UL (ref 4.2–5.4)
SODIUM SERPL-SCNC: 133 MMOL/L (ref 135–145)
WBC # BLD AUTO: 8.8 K/UL (ref 4.8–10.8)

## 2018-12-13 PROCEDURE — 99232 SBSQ HOSP IP/OBS MODERATE 35: CPT | Performed by: HOSPITALIST

## 2018-12-13 PROCEDURE — 80048 BASIC METABOLIC PNL TOTAL CA: CPT

## 2018-12-13 PROCEDURE — 82746 ASSAY OF FOLIC ACID SERUM: CPT

## 2018-12-13 PROCEDURE — 700102 HCHG RX REV CODE 250 W/ 637 OVERRIDE(OP): Performed by: NURSE PRACTITIONER

## 2018-12-13 PROCEDURE — 84207 ASSAY OF VITAMIN B-6: CPT

## 2018-12-13 PROCEDURE — A9270 NON-COVERED ITEM OR SERVICE: HCPCS | Performed by: FAMILY MEDICINE

## 2018-12-13 PROCEDURE — 770001 HCHG ROOM/CARE - MED/SURG/GYN PRIV*

## 2018-12-13 PROCEDURE — 700102 HCHG RX REV CODE 250 W/ 637 OVERRIDE(OP): Performed by: FAMILY MEDICINE

## 2018-12-13 PROCEDURE — 36415 COLL VENOUS BLD VENIPUNCTURE: CPT

## 2018-12-13 PROCEDURE — 84425 ASSAY OF VITAMIN B-1: CPT

## 2018-12-13 PROCEDURE — 700111 HCHG RX REV CODE 636 W/ 250 OVERRIDE (IP): Performed by: FAMILY MEDICINE

## 2018-12-13 PROCEDURE — 85027 COMPLETE CBC AUTOMATED: CPT

## 2018-12-13 PROCEDURE — A9270 NON-COVERED ITEM OR SERVICE: HCPCS | Performed by: NURSE PRACTITIONER

## 2018-12-13 RX ORDER — POTASSIUM CHLORIDE 20 MEQ/1
40 TABLET, EXTENDED RELEASE ORAL ONCE
Status: COMPLETED | OUTPATIENT
Start: 2018-12-13 | End: 2018-12-13

## 2018-12-13 RX ORDER — CYCLOBENZAPRINE HCL 10 MG
10 TABLET ORAL 3 TIMES DAILY PRN
Status: DISCONTINUED | OUTPATIENT
Start: 2018-12-13 | End: 2018-12-19 | Stop reason: HOSPADM

## 2018-12-13 RX ORDER — FOLIC ACID 1 MG/1
1 TABLET ORAL DAILY
Status: DISCONTINUED | OUTPATIENT
Start: 2018-12-13 | End: 2018-12-19 | Stop reason: HOSPADM

## 2018-12-13 RX ADMIN — VITAMIN D, TAB 1000IU (100/BT) 4000 UNITS: 25 TAB at 05:44

## 2018-12-13 RX ADMIN — POLYETHYLENE GLYCOL 3350 1 PACKET: 17 POWDER, FOR SOLUTION ORAL at 05:44

## 2018-12-13 RX ADMIN — OXYCODONE HYDROCHLORIDE 5 MG: 5 TABLET ORAL at 16:45

## 2018-12-13 RX ADMIN — AMLODIPINE BESYLATE 5 MG: 5 TABLET ORAL at 05:43

## 2018-12-13 RX ADMIN — AMLODIPINE BESYLATE 5 MG: 5 TABLET ORAL at 16:45

## 2018-12-13 RX ADMIN — FOLIC ACID 1 MG: 1 TABLET ORAL at 08:35

## 2018-12-13 RX ADMIN — PREDNISONE 5 MG: 5 TABLET ORAL at 05:43

## 2018-12-13 RX ADMIN — POTASSIUM CHLORIDE 40 MEQ: 1500 TABLET, EXTENDED RELEASE ORAL at 08:36

## 2018-12-13 RX ADMIN — STANDARDIZED SENNA CONCENTRATE AND DOCUSATE SODIUM 2 TABLET: 8.6; 5 TABLET, FILM COATED ORAL at 16:45

## 2018-12-13 RX ADMIN — HEPARIN SODIUM 5000 UNITS: 5000 INJECTION, SOLUTION INTRAVENOUS; SUBCUTANEOUS at 14:06

## 2018-12-13 RX ADMIN — STANDARDIZED SENNA CONCENTRATE AND DOCUSATE SODIUM 2 TABLET: 8.6; 5 TABLET, FILM COATED ORAL at 05:43

## 2018-12-13 RX ADMIN — LISINOPRIL 60 MG: 20 TABLET ORAL at 05:43

## 2018-12-13 RX ADMIN — OXYCODONE HYDROCHLORIDE 10 MG: 10 TABLET ORAL at 19:48

## 2018-12-13 RX ADMIN — CYANOCOBALAMIN TAB 500 MCG 1000 MCG: 500 TAB at 05:44

## 2018-12-13 RX ADMIN — HEPARIN SODIUM 5000 UNITS: 5000 INJECTION, SOLUTION INTRAVENOUS; SUBCUTANEOUS at 05:44

## 2018-12-13 RX ADMIN — ENALAPRILAT 1.25 MG: 1.25 INJECTION INTRAVENOUS at 20:57

## 2018-12-13 RX ADMIN — SERTRALINE 200 MG: 100 TABLET, FILM COATED ORAL at 08:40

## 2018-12-13 RX ADMIN — HEPARIN SODIUM 5000 UNITS: 5000 INJECTION, SOLUTION INTRAVENOUS; SUBCUTANEOUS at 20:16

## 2018-12-13 RX ADMIN — OXYCODONE HYDROCHLORIDE 10 MG: 10 TABLET ORAL at 05:43

## 2018-12-13 ASSESSMENT — ENCOUNTER SYMPTOMS
BLURRED VISION: 0
TINGLING: 0
HEADACHES: 0
FOCAL WEAKNESS: 1
NAUSEA: 0
DIARRHEA: 0
ABDOMINAL PAIN: 0
WEAKNESS: 1
SENSORY CHANGE: 0
MYALGIAS: 1
DIZZINESS: 0
FEVER: 0
SHORTNESS OF BREATH: 0
TREMORS: 0
VOMITING: 0
CHILLS: 0
SORE THROAT: 0

## 2018-12-13 ASSESSMENT — PAIN SCALES - GENERAL
PAINLEVEL_OUTOF10: ASSUMED PAIN PRESENT
PAINLEVEL_OUTOF10: 0
PAINLEVEL_OUTOF10: 5
PAINLEVEL_OUTOF10: ASSUMED PAIN PRESENT

## 2018-12-13 NOTE — THERAPY
"Physical Therapy Evaluation completed.   Bed Mobility:  Supine to Sit: Moderate Assist  Transfers: Sit to Stand: Minimal Assist (of 2)  Gait: Level Of Assist: Unable to Participate with Front-Wheel Walker       Plan of Care: Will benefit from Physical Therapy 3 times per week  Discharge Recommendations: Equipment: Will Continue to Assess for Equipment Needs. Recommend inpatient transitional care services for continued physical therapy services.    See \"Rehab Therapy-Acute\" Patient Summary Report for complete documentation.     Pt was recenlty in hosptial for GLF for L hip fracture and had a L hip nailing done. Pt was then transferred to SNF and was sent home on hospice due to not participating with therapy at SNF. Pt is now back in hosptial due to a GLF at home and presents with R hip fracture and is now s/p R hip nailing with WBAT preacutions in place. Pt presented with impaired balance, impaired gait, weakness, pain, and dec activity tolerance. Pt was able to demonstrate Mod A to Max A for bed mobility and Min A x 2 for standing w/FWW use. Pt was primarily limited due to pain. Pt was unable to demonstrate appropriate WB on RLE for weight shifts or side stepping. Pt was then sat down and assited with scooting at the EOB, and encouraged to use UE for assist. Pt declined to sit up in a chair due to increased pain with functional mobilty. Pt will benefit from skilled PT whiile in house, and recommend post acute therapy services prior to d/c home given current objective findings, however, d/c planning is undertermined as the dtr reports she would like the patient to home on possible hospice care and would like patient to improve mobility to be able to transfer again from Bed to BSC or W/C for mobility. However, dtr reports pt was not very cooperative in post acute therapy setting. Will continue to follow and update POC.   "

## 2018-12-13 NOTE — THERAPY
"Occupational Therapy Evaluation completed.   Functional Status: Mod A supine > EOB, total A LB dressing, min A of 2 sit to stands, max A lateral seated scooting, max A of 2 EOB > supine   Plan of Care: Will benefit from Occupational Therapy 3 times per week  Discharge Recommendations:  Equipment: Will Continue to Assess for Equipment Needs. Post-acute therapy: See below    See \"Rehab Therapy-Acute\" Patient Summary Report for complete documentation.    89 y.o. female s/p GLF with resultant R femur fx. Pt underwent ORIF, now WBAT. Pt fractured L hip ~1 month ago (from GLF), had just finished SNF rehab and was home 3 days when this fall occurred. Per daughter, pt participated minimally in therapies at SNF and had only progressed to completing transfers with assist (no gait). Pt had gone home on hospice services. She was attempted to transfer [place] [facility] [place] by herself when she fell. Pt seen now for OT eval. Pt completed: bed mobility, LB dressing, sit to stand at FWW, seated scooting towards HOB. Unable to weight shift in standing due to pain. Pt is limited by: pain, balance impairment, activity intolerance. Of note, daughter also describes that pt experienced \"traumatic event\" in June and since that time spends most of her time in bed and \"doesn't really get dressed\". Daughter would like pt to progress to the point where she can perform transfers to BS and , but plans to resume hospice on DC. Daughter also reports she has no other care giving support and cannot afford to hire agency help. Ordinarily OT would recommend either post-acute SNF or HH, however in light of h/o poor participation pt is probably more appropriate to DC home on hospice. Acute OT will follow pt to progress transfers and toileting while in-house.   "

## 2018-12-13 NOTE — PROGRESS NOTES
[facility] Hospitalist Progress Note    Date of Service: 2018    Chief Complaint  89 y.o. Female with h/o asthma, HTN, left hip fracture admitted 2018 with right hip pain.  Found to have right hip fracture.  S/P right hip nailing on .    Interval Problem Update  :  POD 1.  Pain controlled.  Very limited mobility of RLE.  PT/OT to follow.  Expresses desire to go home at time of discharge.  VSS.    :  Increased pain of right hip today.  Continues to request to go home at discharge.  Working with PT/OT.  Attempt to improve strength for transfers prior to d/c home.    Consultants/Specialty  Ortho    Disposition  Anticipate home with hospice when stable.        Review of Systems   Constitutional: Negative for chills, fever and malaise/fatigue.   HENT: Negative for congestion and sore throat.    Eyes: Negative for blurred vision.   Respiratory: Negative for shortness of breath.    Cardiovascular: Negative for chest pain and leg swelling.   Gastrointestinal: Negative for abdominal pain, diarrhea, nausea and vomiting.   Genitourinary: Negative for dysuria and urgency.   Musculoskeletal: Positive for joint pain and myalgias.   Skin: Negative for rash.   Neurological: Positive for focal weakness and weakness. Negative for dizziness, tingling, tremors, sensory change and headaches.      Physical Exam  Laboratory/Imaging   Hemodynamics  Temp (24hrs), Av.4 °C (97.6 °F), Min:36.1 °C (97 °F), Max:36.8 °C (98.3 °F)   Temperature: 36.1 °C (97 °F)  Pulse  Av  Min: 56  Max: 119    Blood Pressure : 124/63      Respiratory      Respiration: 17, Pulse Oximetry: 97 %     Work Of Breathing / Effort: (P) Mild  RUL Breath Sounds: (P) Diminished, RML Breath Sounds: (P) Diminished, RLL Breath Sounds: (P) Diminished, GARY Breath Sounds: (P) Diminished, LLL Breath Sounds: (P) Diminished    Fluids    Intake/Output Summary (Last 24 hours) at 18 1112  Last data filed at 18 1300   Gross per 24 hour   Intake               240 ml   Output                0 ml   Net              240 ml       Nutrition  Orders Placed This Encounter   Procedures   • Diet Order Regular     Standing Status:   Standing     Number of Occurrences:   1     Order Specific Question:   Diet:     Answer:   Regular [1]     Physical Exam   Constitutional: She is oriented to person, place, and time.   Elderly female.   HENT:   Head: Normocephalic and atraumatic.   Right Ear: External ear normal.   Left Ear: External ear normal.   Eyes: Conjunctivae are normal. No scleral icterus.   Neck: Normal range of motion. No JVD present.   Cardiovascular: Normal rate, regular rhythm, normal heart sounds and intact distal pulses.  Exam reveals no friction rub.    No murmur heard.  Pulmonary/Chest: Effort normal and breath sounds normal. No stridor. No respiratory distress. She has no wheezes. She has no rales.   Abdominal: Soft. Bowel sounds are normal. She exhibits no distension. There is no tenderness. There is no rebound.   Musculoskeletal: She exhibits tenderness.   RLE weakness.   Neurological: She is alert and oriented to person, place, and time.   Skin: Skin is warm and dry. She is not diaphoretic. No erythema.   Psychiatric: She has a normal mood and affect.   Nursing note and vitals reviewed.      Recent Labs      12/11/18   0544  12/12/18   0526  12/13/18   0418   WBC  7.5  11.6*  8.8   RBC  4.04*  3.54*  3.31*   HEMOGLOBIN  12.0  10.5*  10.0*   HEMATOCRIT  36.5*  32.8*  29.6*   MCV  90.3  92.7  89.4   MCH  29.7  29.7  30.2   MCHC  32.9*  32.0*  33.8   RDW  46.3  48.0  45.6   PLATELETCT  298  252  205   MPV  10.0  9.7  9.7     Recent Labs      12/11/18   0544  12/12/18   0526  12/13/18   0418   SODIUM  138  137  133*   POTASSIUM  3.3*  4.1  3.5*   CHLORIDE  98  100  98   CO2  34*  34*  33   GLUCOSE  153*  163*  152*   BUN  13  16  14   CREATININE  0.97  1.04  0.80   CALCIUM  9.1  9.1  9.0     Recent Labs      12/11/18   0544   INR  1.02                   Assessment/Plan     * Closed intertrochanteric fracture of right femur (HCC)- (present on admission)   Assessment & Plan    S/P right hip nailing on 12/11  Continue heparin  Pain control  PT/OT following.  Ortho following.  Patient wanting to go home.  Was on hospice prior to admit.  Anticipate therapy in the hospital and discharge home with hospice.       Frailty syndrome in geriatric patient- (present on admission)   Assessment & Plan    Nutrition following.   Encourage oral intake.  PT/OT following.  Anticipate home with hospice at d/c.     Type 2 diabetes mellitus with kidney complication, without long-term current use of insulin ([facility])- (present on admission)   Assessment & Plan    Diet-controlled     Frequent falls- (present on admission)   Assessment & Plan    PT/OT following.     Hypokalemia- (present on admission)   Assessment & Plan    Replaced.  Follow bmp.       Closed intertrochanteric fracture of left femur (HCC)- (present on admission)   Assessment & Plan    s/p IM nailing 11/18/2018  PT/OT     Urinary retention- (present on admission)   Assessment & Plan    Avery catheter removed.  Bladder scan as needed for retention.     Chronic kidney disease, stage III (moderate) ([facility])- (present on admission)   Assessment & Plan    Follow BMP     Vitamin D insufficiency- (present on admission)   Assessment & Plan    Oral replacement.     HTN (hypertension)- (present on admission)   Assessment & Plan    Continue Lisinopril, Norvasc     COPD (chronic obstructive pulmonary disease) ([facility])- (present on admission)   Assessment & Plan    No acute exacerbation.  RT protocol, encourage IS     Folic acid deficiency   Assessment & Plan    Start oral replacement.     Depression- (present on admission)   Assessment & Plan    Zoloft       Quality-Core Measures   Reviewed items::  Radiology images reviewed, Labs reviewed and Medications reviewed  Avery catheter::  No Avery  DVT prophylaxis pharmacological::  Heparin

## 2018-12-13 NOTE — PROGRESS NOTES
Patient still unable to feel the urge to void. Bladder scan result of 278 ml. Paged Dr. Martin. With orders to perform another bladder scan after 6 hours.

## 2018-12-13 NOTE — CARE PLAN
Problem: Pain Management  Goal: Pain level will decrease to patient's comfort goal    Intervention: Follow pain managment plan developed in collaboration with patient and Interdisciplinary Team  Educate patient on pain management scale and medicate per prn orders.

## 2018-12-13 NOTE — PROGRESS NOTES
"Progress Note     Interval changes:improved     ROS - Patient denies any new issues. No chest pain, dyspnea, or fever.  Pain well controlled.   /63   Pulse (!) 119   Temp 36.1 °C (97 °F) (Temporal)   Resp 17   Ht 1.6 m (5' 3\")   Wt 59 kg (130 lb 1.1 oz)   SpO2 97%   Breastfeeding? No   BMI 23.04 kg/m²      Patient seen and examined  No acute distress  Breathing non labored  RRR  Right hip: dressing clean, dry.  Moderate leg ecchymosis  +EHL/FHL/TA  SILT m/l/1st dws  wwp toes  Recent Labs      12/11/18   0544  12/12/18   0526 12/13/18 0418   WBC  7.5  11.6*  8.8   RBC  4.04*  3.54*  3.31*   HEMOGLOBIN  12.0  10.5*  10.0*   HEMATOCRIT  36.5*  32.8*  29.6*   MCV  90.3  92.7  89.4   MCH  29.7  29.7  30.2   RDW  46.3  48.0  45.6   PLATELETCT  298  252  205   MPV  10.0  9.7  9.7   NEUTSPOLYS  64.40  79.70*   --    LYMPHOCYTES  25.30  11.40*   --    MONOCYTES  6.50  7.20   --    EOSINOPHILS  1.90  0.20   --    BASOPHILS  0.40  0.30   --      Recent Labs      12/11/18   0544  12/12/18   0526 12/13/18 0418   SODIUM  138  137  133*   POTASSIUM  3.3*  4.1  3.5*   CHLORIDE  98  100  98   CO2  34*  34*  33   GLUCOSE  153*  163*  152*   BUN  13  16  14        A/P: POD#2 right hip IMHS  WBAT  Pain control  PT/OT- strengthen, rehab vs hospice.  Heparin for DVT prophylaxis, ASA for discharge.            Patient Active Problem List   Diagnosis   • Asthma, moderate persistent, poorly-controlled   • COPD (chronic obstructive pulmonary disease) ([facility])   • HTN (hypertension)   • Vitamin D insufficiency   • Depression   • Fatigue   • Hyperlipidemia   • Grief reaction   • Insomnia   • Nocturnal hypoxia   • Urinary frequency   • Constipation   • At risk for falls   • Recurrent UTI   • Nausea   • Chronic kidney disease, stage III (moderate) ([facility])   • Urinary retention   • Closed intertrochanteric fracture of left femur ([facility])   • Fall   • UTI (urinary tract infection)   • Hyperglycemia   • Hypokalemia   • Closed " intertrochanteric fracture of right femur (HCC)   • Frailty syndrome in geriatric patient   • Frequent falls   • Type 2 diabetes mellitus with kidney complication, without long-term current use of insulin (HCC)   • Folic acid deficiency

## 2018-12-13 NOTE — DIETARY
"Nutrition services: Day 2 of admit.  89 yo female admitted following a fall sustaining a right hip fracture.   Consult for weight loss of 10# or greater over 3 months PTA.    Evaluation:  1. Weight on admit 59 kg (bed scale) is increased 5 kg from previous admit weight of 54 kg (bed scale) on 11/18/2018  2. Currently on a regular diet taking 50-75% of meals.  3.   BMI 23.04  4.   Pt with recent history of frequent falls. She had a fall in November sustaining a left hip fracture and was discharged from rehab             hospital as \"frailty syndrome\" and placed on hospice. New admit with right hip fracture.     Recommendations/Plan:  1. encourage intake of meals. Order supplements if pt is taking less than 50% of most meals  2. document intake of all meals and supplements as % taken in ADL's to provide interdisciplinary communication across all shifts   3. monitor daily weights  4. Nutrition rep will continue to see patient for ongoing meal and snack preferences.       "

## 2018-12-13 NOTE — CARE PLAN
Problem: Respiratory:  Goal: Respiratory status will improve    Intervention: Assess and monitor pulmonary status  On O2 at 3LPM per nasal cannula with O2 sat at 92%. No complaints of shortness of breath. Encouraged use of IS and DBE.      Problem: Urinary Elimination:  Goal: Ability to reestablish a normal urinary elimination pattern will improve    Intervention: Assess and monitor for signs and symptoms of urinary retention  Patient still unable to feel urge to void. Bladder scans ordered.

## 2018-12-14 LAB
25(OH)D3 SERPL-MCNC: 13 NG/ML (ref 30–100)
ANION GAP SERPL CALC-SCNC: 4 MMOL/L (ref 0–11.9)
BUN SERPL-MCNC: 11 MG/DL (ref 8–22)
CALCIUM SERPL-MCNC: 9 MG/DL (ref 8.5–10.5)
CHLORIDE SERPL-SCNC: 99 MMOL/L (ref 96–112)
CO2 SERPL-SCNC: 30 MMOL/L (ref 20–33)
CREAT SERPL-MCNC: 0.69 MG/DL (ref 0.5–1.4)
ERYTHROCYTE [DISTWIDTH] IN BLOOD BY AUTOMATED COUNT: 47.8 FL (ref 35.9–50)
GLUCOSE SERPL-MCNC: 156 MG/DL (ref 65–99)
HCT VFR BLD AUTO: 29.6 % (ref 37–47)
HGB BLD-MCNC: 9.6 G/DL (ref 12–16)
MAGNESIUM SERPL-MCNC: 1.9 MG/DL (ref 1.5–2.5)
MCH RBC QN AUTO: 29.8 PG (ref 27–33)
MCHC RBC AUTO-ENTMCNC: 32.4 G/DL (ref 33.6–35)
MCV RBC AUTO: 91.9 FL (ref 81.4–97.8)
PLATELET # BLD AUTO: 215 K/UL (ref 164–446)
PMV BLD AUTO: 10.1 FL (ref 9–12.9)
POTASSIUM SERPL-SCNC: 4.1 MMOL/L (ref 3.6–5.5)
RBC # BLD AUTO: 3.22 M/UL (ref 4.2–5.4)
SODIUM SERPL-SCNC: 133 MMOL/L (ref 135–145)
WBC # BLD AUTO: 8.1 K/UL (ref 4.8–10.8)

## 2018-12-14 PROCEDURE — 700102 HCHG RX REV CODE 250 W/ 637 OVERRIDE(OP): Performed by: NURSE PRACTITIONER

## 2018-12-14 PROCEDURE — 700111 HCHG RX REV CODE 636 W/ 250 OVERRIDE (IP): Performed by: FAMILY MEDICINE

## 2018-12-14 PROCEDURE — 99232 SBSQ HOSP IP/OBS MODERATE 35: CPT | Performed by: HOSPITALIST

## 2018-12-14 PROCEDURE — 80048 BASIC METABOLIC PNL TOTAL CA: CPT

## 2018-12-14 PROCEDURE — 82306 VITAMIN D 25 HYDROXY: CPT

## 2018-12-14 PROCEDURE — A9270 NON-COVERED ITEM OR SERVICE: HCPCS | Performed by: NURSE PRACTITIONER

## 2018-12-14 PROCEDURE — 83735 ASSAY OF MAGNESIUM: CPT

## 2018-12-14 PROCEDURE — 85027 COMPLETE CBC AUTOMATED: CPT

## 2018-12-14 PROCEDURE — 700102 HCHG RX REV CODE 250 W/ 637 OVERRIDE(OP): Performed by: FAMILY MEDICINE

## 2018-12-14 PROCEDURE — 97530 THERAPEUTIC ACTIVITIES: CPT

## 2018-12-14 PROCEDURE — 97535 SELF CARE MNGMENT TRAINING: CPT

## 2018-12-14 PROCEDURE — A9270 NON-COVERED ITEM OR SERVICE: HCPCS | Performed by: FAMILY MEDICINE

## 2018-12-14 PROCEDURE — 36415 COLL VENOUS BLD VENIPUNCTURE: CPT

## 2018-12-14 PROCEDURE — 770001 HCHG ROOM/CARE - MED/SURG/GYN PRIV*

## 2018-12-14 RX ORDER — ERGOCALCIFEROL 1.25 MG/1
50000 CAPSULE ORAL
Status: DISCONTINUED | OUTPATIENT
Start: 2018-12-14 | End: 2018-12-19 | Stop reason: HOSPADM

## 2018-12-14 RX ADMIN — ERGOCALCIFEROL 50000 UNITS: 1.25 CAPSULE ORAL at 15:45

## 2018-12-14 RX ADMIN — ENALAPRILAT 1.25 MG: 1.25 INJECTION INTRAVENOUS at 06:52

## 2018-12-14 RX ADMIN — CYANOCOBALAMIN TAB 500 MCG 1000 MCG: 500 TAB at 05:41

## 2018-12-14 RX ADMIN — AMLODIPINE BESYLATE 5 MG: 5 TABLET ORAL at 05:41

## 2018-12-14 RX ADMIN — STANDARDIZED SENNA CONCENTRATE AND DOCUSATE SODIUM 2 TABLET: 8.6; 5 TABLET, FILM COATED ORAL at 05:41

## 2018-12-14 RX ADMIN — FOLIC ACID 1 MG: 1 TABLET ORAL at 05:40

## 2018-12-14 RX ADMIN — PREDNISONE 5 MG: 5 TABLET ORAL at 05:41

## 2018-12-14 RX ADMIN — AMLODIPINE BESYLATE 5 MG: 5 TABLET ORAL at 17:49

## 2018-12-14 RX ADMIN — LISINOPRIL 60 MG: 20 TABLET ORAL at 05:41

## 2018-12-14 RX ADMIN — SERTRALINE 200 MG: 100 TABLET, FILM COATED ORAL at 11:48

## 2018-12-14 RX ADMIN — HEPARIN SODIUM 5000 UNITS: 5000 INJECTION, SOLUTION INTRAVENOUS; SUBCUTANEOUS at 21:00

## 2018-12-14 RX ADMIN — HEPARIN SODIUM 5000 UNITS: 5000 INJECTION, SOLUTION INTRAVENOUS; SUBCUTANEOUS at 15:42

## 2018-12-14 RX ADMIN — HEPARIN SODIUM 5000 UNITS: 5000 INJECTION, SOLUTION INTRAVENOUS; SUBCUTANEOUS at 05:41

## 2018-12-14 RX ADMIN — STANDARDIZED SENNA CONCENTRATE AND DOCUSATE SODIUM 2 TABLET: 8.6; 5 TABLET, FILM COATED ORAL at 17:49

## 2018-12-14 RX ADMIN — OXYCODONE HYDROCHLORIDE 10 MG: 10 TABLET ORAL at 11:48

## 2018-12-14 RX ADMIN — VITAMIN D, TAB 1000IU (100/BT) 4000 UNITS: 25 TAB at 05:41

## 2018-12-14 RX ADMIN — OXYCODONE HYDROCHLORIDE 10 MG: 10 TABLET ORAL at 17:49

## 2018-12-14 RX ADMIN — MAGNESIUM HYDROXIDE 30 ML: 400 SUSPENSION ORAL at 05:40

## 2018-12-14 ASSESSMENT — COGNITIVE AND FUNCTIONAL STATUS - GENERAL
TOILETING: TOTAL
DAILY ACTIVITIY SCORE: 15
SUGGESTED CMS G CODE MODIFIER DAILY ACTIVITY: CK
HELP NEEDED FOR BATHING: A LOT
DRESSING REGULAR UPPER BODY CLOTHING: A LITTLE
DRESSING REGULAR LOWER BODY CLOTHING: TOTAL

## 2018-12-14 ASSESSMENT — ENCOUNTER SYMPTOMS
VOMITING: 0
TREMORS: 0
FOCAL WEAKNESS: 1
BLURRED VISION: 0
PALPITATIONS: 0
SHORTNESS OF BREATH: 0
HEADACHES: 0
FEVER: 0
ABDOMINAL PAIN: 0
TINGLING: 0
DIARRHEA: 0
DOUBLE VISION: 0
CHILLS: 0
WEAKNESS: 1
DIZZINESS: 0
NAUSEA: 0
CONSTIPATION: 0
MYALGIAS: 1
COUGH: 0
BACK PAIN: 0

## 2018-12-14 ASSESSMENT — PAIN SCALES - GENERAL
PAINLEVEL_OUTOF10: 6
PAINLEVEL_OUTOF10: 4
PAINLEVEL_OUTOF10: 7
PAINLEVEL_OUTOF10: 3
PAINLEVEL_OUTOF10: 2
PAINLEVEL_OUTOF10: ASSUMED PAIN PRESENT
PAINLEVEL_OUTOF10: 6

## 2018-12-14 NOTE — DISCHARGE PLANNING
[facility] informed patient and daughter of Medicare right to appeal discharge. Patient and daughter expressed understanding. IMM signed.     IMM filed in patient's chart.

## 2018-12-14 NOTE — CARE PLAN
Problem: Pain Management  Goal: Pain level will decrease to patient's comfort goal    Intervention: Follow pain managment plan developed in collaboration with patient and Interdisciplinary Team  Pain control improving with oral medications      Problem: Mobility  Goal: Risk for activity intolerance will decrease    Intervention: Encourage patient to increase activity level in collaboration with Interdisciplinary Team  Encouraging out of bed.

## 2018-12-14 NOTE — DISCHARGE PLANNING
Anticipated Discharge Disposition: Home with Hospice    Action: KYM met with Pt and her dtr Ghada at bedside and completed assessment. Pt previously had [facility] and signed choice form to continue with [facility] upon d/c. Pt lived at home with dtr and son and hospice care.     Barriers to Discharge: acceptance to [facility]    Plan: Discharge home with [facility] once medically cleared.

## 2018-12-14 NOTE — DISCHARGE PLANNING
LSW informed by MANUEL Ludwig from [facility] that she discussed if they would be interested in possible SNF before dc home and family declined. MANUEL Ludwig from [facility] stated that when the Pt does dc home family requested that transportation be provided and was told to contact [facility] and they will set up transport.

## 2018-12-14 NOTE — CARE PLAN
Problem: Safety  Goal: Will remain free from falls    Intervention: Implement fall precautions  Calls appropriately. Call light and personal belongings within easy reach. Educated on level of risk. Fall precautions in place. Instructed to call for assistance whenever needed.      Problem: Venous Thromboembolism (VTW)/Deep Vein Thrombosis (DVT) Prevention:  Goal: Patient will participate in Venous Thrombosis (VTE)/Deep Vein Thrombosis (DVT)Prevention Measures  Outcome: PROGRESSING AS EXPECTED   12/13/18 2100   Mechanical/VTE Prophylaxis   Mechanical Prophylaxis  SCDs, Sequential Compression Device   SCDs, Sequential Compression Device On   OTHER   Risk Assessment Score 4   VTE RISK High   Pharmacologic Prophylaxis Used Unfractionated Heparin

## 2018-12-14 NOTE — PROGRESS NOTES
"   Orthopaedic PA Progress Note    Interval changes:Comfortable in bed, incontinent to urine today, D/W surgery, recommending SNF stay    ROS - Patient denies any new issues. No chest pain, dyspnea, or fever.  Pain well controlled.    Blood pressure (!) 163/66, pulse 67, temperature 37 °C (98.6 °F), temperature source Oral, resp. rate 19, height 1.6 m (5' 3\"), weight 59 kg (130 lb 1.1 oz), SpO2 99 %, not currently breastfeeding.    Patient seen and examined  No acute distress  Breathing non labored  RRR  Surgical dressing is clean, dry, and intact. Patient clearly fires tibialis anterior, EHL, and gastrocnemius/soleus. Sensation is intact to light touch throughout superficial peroneal, deep peroneal, tibial, saphenous, and sural nerve distributions. Strong and palpable 2+ dorsalis pedis and posterior tibial pulses with capillary refill less than 2 seconds. No lower leg tenderness or discomfort.    Recent Labs      12/12/18   0526  12/13/18   0418  12/14/18   0415   WBC  11.6*  8.8  8.1   RBC  3.54*  3.31*  3.22*   HEMOGLOBIN  10.5*  10.0*  9.6*   HEMATOCRIT  32.8*  29.6*  29.6*   MCV  92.7  89.4  91.9   MCH  29.7  30.2  29.8   MCHC  32.0*  33.8  32.4*   RDW  48.0  45.6  47.8   PLATELETCT  252  205  215   MPV  9.7  9.7  10.1     Active Hospital Problems    Diagnosis   • Closed intertrochanteric fracture of right femur (HCC) [S72.141A]     Priority: High   • Frailty syndrome in geriatric patient [R54]     Priority: High   • Frequent falls [R29.6]     Priority: Medium   • Type 2 diabetes mellitus with kidney complication, without long-term current use of insulin ([facility]) [E11.29]     Priority: Medium   • Hypokalemia [E87.6]     Priority: Medium   • Closed intertrochanteric fracture of left femur ([facility]) [S72.142A]     Priority: Medium   • Urinary retention [R33.9]     Priority: Medium   • Chronic kidney disease, stage III (moderate) ([facility]) [N18.3]     Priority: Medium   • COPD (chronic obstructive pulmonary disease) ([facility]) " [J44.9]     Priority: Medium   • HTN (hypertension) [I10]     Priority: Medium   • Vitamin D insufficiency [E55.9]     Priority: Medium   • Folic acid deficiency [E53.8]     Priority: Low   • Depression [F32.9]     Priority: Low     Assessment/Plan:  POD#3 S/P Rt hip nail  Wt bearing status - as tolerated  PT/OT-initiated  Wound care:dresasing CDI, change in 7 days  Drains - no  Avery-may DC  Sutures/Staples out- 10-14 days post operatively  Antibiotics: complete  DVT Prophylaxis- TEDS/SCDs/Foot pumps.   UFH 5000u q 8hrs  Future Procedures - not anticipated  Case Coordination for Discharge Planning - Disposition SNf vs home, anticipated good recovery  SNF orders placed

## 2018-12-14 NOTE — PROGRESS NOTES
[facility] Hospitalist Progress Note    Date of Service: 2018    Chief Complaint  89 y.o. Female with h/o asthma, HTN, left hip fracture admitted 2018 with right hip pain.  Found to have right hip fracture.  S/P right hip nailing on .    Interval Problem Update  :  POD 1.  Pain controlled.  Very limited mobility of RLE.  PT/OT to follow.  Expresses desire to go home at time of discharge.  VSS.    :  Increased pain of right hip today.  Continues to request to go home at discharge.  Working with PT/OT.  Attempt to improve strength for transfers prior to d/c home.    :  Pain improving.  Encourage OOB daily.  Complaining of frequent urination.      Consultants/Specialty  Ortho    Disposition  Anticipate home with hospice when stable.        Review of Systems   Constitutional: Negative for chills and fever.   HENT: Negative for congestion.    Eyes: Negative for blurred vision and double vision.   Respiratory: Negative for cough and shortness of breath.    Cardiovascular: Negative for chest pain, palpitations and leg swelling.   Gastrointestinal: Negative for abdominal pain, constipation, diarrhea, nausea and vomiting.   Genitourinary: Positive for frequency. Negative for dysuria.   Musculoskeletal: Positive for joint pain and myalgias. Negative for back pain.   Skin: Negative for itching and rash.   Neurological: Positive for focal weakness and weakness. Negative for dizziness, tingling, tremors and headaches.      Physical Exam  Laboratory/Imaging   Hemodynamics  Temp (24hrs), Av.7 °C (98 °F), Min:36 °C (96.8 °F), Max:37 °C (98.6 °F)   Temperature: 36.4 °C (97.6 °F)  Pulse  Av.7  Min: 56  Max: 119    Blood Pressure : 129/67      Respiratory      Respiration: 17, Pulse Oximetry: 99 %     Work Of Breathing / Effort: Mild  RUL Breath Sounds: Diminished, RML Breath Sounds: Diminished, RLL Breath Sounds: Diminished, GARY Breath Sounds: Diminished, LLL Breath Sounds:  Diminished    Fluids  No intake or output data in the 24 hours ending 12/14/18 1355    Nutrition  Orders Placed This Encounter   Procedures   • Diet Order Regular     Standing Status:   Standing     Number of Occurrences:   1     Order Specific Question:   Diet:     Answer:   Regular [1]     Physical Exam   Constitutional: She is oriented to person, place, and time. She appears well-developed. No distress.   Elderly female.   HENT:   Head: Normocephalic and atraumatic.   Mouth/Throat: No oropharyngeal exudate.   Eyes: Conjunctivae are normal. Right eye exhibits no discharge. Left eye exhibits no discharge.   Neck: Normal range of motion. No tracheal deviation present.   Cardiovascular: Normal rate, regular rhythm, normal heart sounds and intact distal pulses.    No murmur heard.  Pulmonary/Chest: Effort normal and breath sounds normal. No stridor. No respiratory distress. She has no wheezes.   Abdominal: Soft. Bowel sounds are normal. She exhibits no distension. There is no tenderness.   Musculoskeletal: She exhibits tenderness. She exhibits no edema.   RLE weakness.   Neurological: She is alert and oriented to person, place, and time.   Skin: Skin is warm and dry. No rash noted. She is not diaphoretic. No erythema.   Psychiatric: She has a normal mood and affect.   Nursing note and vitals reviewed.      Recent Labs      12/12/18   0526  12/13/18 0418 12/14/18 0415   WBC  11.6*  8.8  8.1   RBC  3.54*  3.31*  3.22*   HEMOGLOBIN  10.5*  10.0*  9.6*   HEMATOCRIT  32.8*  29.6*  29.6*   MCV  92.7  89.4  91.9   MCH  29.7  30.2  29.8   MCHC  32.0*  33.8  32.4*   RDW  48.0  45.6  47.8   PLATELETCT  252  205  215   MPV  9.7  9.7  10.1     Recent Labs      12/12/18   0526  12/13/18 0418  12/14/18 0415   SODIUM  137  133*  133*   POTASSIUM  4.1  3.5*  4.1   CHLORIDE  100  98  99   CO2  34*  33  30   GLUCOSE  163*  152*  156*   BUN  16  14  11   CREATININE  1.04  0.80  0.69   CALCIUM  9.1  9.0  9.0                       Assessment/Plan     * Closed intertrochanteric fracture of right femur (HCC)- (present on admission)   Assessment & Plan    S/P right hip nailing on 12/11  Continue heparin  Pain control  PT/OT following.  Ortho following.  Patient wanting to go home.  Was on hospice prior to admit.  Anticipate therapy in the hospital and discharge home with hospice.  Will consider SNF if weakness does not improve.       Frailty syndrome in geriatric patient- (present on admission)   Assessment & Plan    Nutrition following.   Encourage oral intake.  PT/OT following.  Anticipate home with hospice at d/c.     Type 2 diabetes mellitus with kidney complication, without long-term current use of insulin ([facility])- (present on admission)   Assessment & Plan    Diet-controlled     Frequent falls- (present on admission)   Assessment & Plan    PT/OT following.     Hypokalemia- (present on admission)   Assessment & Plan    Replaced.  Follow bmp.       Closed intertrochanteric fracture of left femur (HCC)- (present on admission)   Assessment & Plan    s/p IM nailing 11/18/2018  PT/OT     Urinary retention- (present on admission)   Assessment & Plan    Avery catheter removed.  Resolved.     Chronic kidney disease, stage III (moderate) ([facility])- (present on admission)   Assessment & Plan    Follow BMP     Vitamin D insufficiency- (present on admission)   Assessment & Plan    Oral replacement.     HTN (hypertension)- (present on admission)   Assessment & Plan    Continue Lisinopril, Norvasc     COPD (chronic obstructive pulmonary disease) ([facility])- (present on admission)   Assessment & Plan    No acute exacerbation.  RT protocol, encourage IS     Folic acid deficiency   Assessment & Plan    Continue oral replacement.     Depression- (present on admission)   Assessment & Plan    Zoloft       Quality-Core Measures   Reviewed items::  Labs reviewed and Medications reviewed  Avery catheter::  No Avery  DVT prophylaxis pharmacological::  Heparin

## 2018-12-14 NOTE — DISCHARGE PLANNING
LSW met with the Pt to discuss hospice choice form and Pt asked that this LSW contact her dtr to discuss choice. LSW contact Pt dtr from emergency contacts Isela Shukri, who stated that her sister Ghada would be the best one to speak to and stated Ghada will be at [facility] today. Ghada phone number [phone].

## 2018-12-14 NOTE — THERAPY
"Occupational Therapy Treatment completed with focus on ADLs and ADL transfers.  Functional Status: Mod A supine > EOB, total A LB dressing, mod A stand-pivot transfers without A (therapist assist), total A gamal hygiene   Plan of Care: Will benefit from Occupational Therapy 3 times per week  Discharge Recommendations:  Equipment Will Continue to Assess for Equipment Needs. Post-acute therapy: Recommend inpatient transitional care services for continued occupational therapy services.     See \"Rehab Therapy-Acute\" Patient Summary Report for complete documentation.     Pt seen for OT tx. Received in bed. Completed bed mobility to L, sock donning, stand-pivot to BSC, toileting for urination, stand-pivot to EOB then to bedside chair. Declined grooming once seated. Pt required total A for socks and gamal hygiene. From commode, pt completed sit to stand at FWW. Attempted pivot but pt unable to adequately un-weight RLE using BUE through walker. Improved transfer with \"bear-hug\" approach, but only able to pivot 1/4 turn at a time. Pt's daughter observed tx and stated she would be unable to assist with transfers at this level but is hopeful her mother's transfers will improve. Acute OT to continue following with focus on transfer training. Pt may benefit from post-acute transitional care setting as progress is anticipated to be slow, however pt & daughter decline at this time.       "

## 2018-12-14 NOTE — DISCHARGE PLANNING
Received Choice form at [id number]  Agency/Facility Name: [facility]  Referral sent per Choice form @ 0729

## 2018-12-15 PROBLEM — D62 ACUTE BLOOD LOSS ANEMIA: Status: ACTIVE | Noted: 2018-12-15

## 2018-12-15 LAB
EKG IMPRESSION: NORMAL
IRON SATN MFR SERPL: 16 % (ref 15–55)
IRON SERPL-MCNC: 31 UG/DL (ref 40–170)
TIBC SERPL-MCNC: 195 UG/DL (ref 250–450)
VIT B6 SERPL-MCNC: 9.1 NMOL/L (ref 20–125)

## 2018-12-15 PROCEDURE — A9270 NON-COVERED ITEM OR SERVICE: HCPCS | Performed by: NURSE PRACTITIONER

## 2018-12-15 PROCEDURE — 700102 HCHG RX REV CODE 250 W/ 637 OVERRIDE(OP): Performed by: FAMILY MEDICINE

## 2018-12-15 PROCEDURE — 99232 SBSQ HOSP IP/OBS MODERATE 35: CPT | Performed by: HOSPITALIST

## 2018-12-15 PROCEDURE — 83550 IRON BINDING TEST: CPT

## 2018-12-15 PROCEDURE — 700102 HCHG RX REV CODE 250 W/ 637 OVERRIDE(OP): Performed by: HOSPITALIST

## 2018-12-15 PROCEDURE — 700102 HCHG RX REV CODE 250 W/ 637 OVERRIDE(OP): Performed by: NURSE PRACTITIONER

## 2018-12-15 PROCEDURE — 97530 THERAPEUTIC ACTIVITIES: CPT

## 2018-12-15 PROCEDURE — A9270 NON-COVERED ITEM OR SERVICE: HCPCS | Performed by: HOSPITALIST

## 2018-12-15 PROCEDURE — 36415 COLL VENOUS BLD VENIPUNCTURE: CPT

## 2018-12-15 PROCEDURE — 83540 ASSAY OF IRON: CPT

## 2018-12-15 PROCEDURE — A9270 NON-COVERED ITEM OR SERVICE: HCPCS | Performed by: FAMILY MEDICINE

## 2018-12-15 PROCEDURE — 700111 HCHG RX REV CODE 636 W/ 250 OVERRIDE (IP): Performed by: FAMILY MEDICINE

## 2018-12-15 PROCEDURE — 97116 GAIT TRAINING THERAPY: CPT

## 2018-12-15 PROCEDURE — 770001 HCHG ROOM/CARE - MED/SURG/GYN PRIV*

## 2018-12-15 RX ORDER — DIPHENHYDRAMINE HCL 25 MG
25-50 TABLET ORAL NIGHTLY PRN
Status: DISCONTINUED | OUTPATIENT
Start: 2018-12-15 | End: 2018-12-19 | Stop reason: HOSPADM

## 2018-12-15 RX ADMIN — SERTRALINE 200 MG: 100 TABLET, FILM COATED ORAL at 08:25

## 2018-12-15 RX ADMIN — CYANOCOBALAMIN TAB 500 MCG 1000 MCG: 500 TAB at 06:08

## 2018-12-15 RX ADMIN — AMLODIPINE BESYLATE 5 MG: 5 TABLET ORAL at 18:11

## 2018-12-15 RX ADMIN — PREDNISONE 5 MG: 5 TABLET ORAL at 08:25

## 2018-12-15 RX ADMIN — LISINOPRIL 60 MG: 20 TABLET ORAL at 06:08

## 2018-12-15 RX ADMIN — HEPARIN SODIUM 5000 UNITS: 5000 INJECTION, SOLUTION INTRAVENOUS; SUBCUTANEOUS at 06:08

## 2018-12-15 RX ADMIN — FOLIC ACID 1 MG: 1 TABLET ORAL at 06:08

## 2018-12-15 RX ADMIN — MAGNESIUM HYDROXIDE 30 ML: 400 SUSPENSION ORAL at 15:14

## 2018-12-15 RX ADMIN — DIPHENHYDRAMINE HCL 50 MG: 25 TABLET ORAL at 21:27

## 2018-12-15 RX ADMIN — OXYCODONE HYDROCHLORIDE 5 MG: 5 TABLET ORAL at 13:10

## 2018-12-15 RX ADMIN — HEPARIN SODIUM 5000 UNITS: 5000 INJECTION, SOLUTION INTRAVENOUS; SUBCUTANEOUS at 21:27

## 2018-12-15 RX ADMIN — HEPARIN SODIUM 5000 UNITS: 5000 INJECTION, SOLUTION INTRAVENOUS; SUBCUTANEOUS at 15:14

## 2018-12-15 RX ADMIN — STANDARDIZED SENNA CONCENTRATE AND DOCUSATE SODIUM 2 TABLET: 8.6; 5 TABLET, FILM COATED ORAL at 18:11

## 2018-12-15 RX ADMIN — STANDARDIZED SENNA CONCENTRATE AND DOCUSATE SODIUM 2 TABLET: 8.6; 5 TABLET, FILM COATED ORAL at 06:08

## 2018-12-15 RX ADMIN — POLYETHYLENE GLYCOL 3350 1 PACKET: 17 POWDER, FOR SOLUTION ORAL at 15:14

## 2018-12-15 ASSESSMENT — ENCOUNTER SYMPTOMS
SHORTNESS OF BREATH: 0
DIARRHEA: 0
NAUSEA: 0
BLURRED VISION: 0
VOMITING: 0
HEADACHES: 0
ABDOMINAL PAIN: 0
TINGLING: 0
SENSORY CHANGE: 0
WEAKNESS: 1
TREMORS: 0
FEVER: 0
CHILLS: 0
FOCAL WEAKNESS: 1
SORE THROAT: 0
SPEECH CHANGE: 0
DIZZINESS: 0
MYALGIAS: 1

## 2018-12-15 ASSESSMENT — COGNITIVE AND FUNCTIONAL STATUS - GENERAL
CLIMB 3 TO 5 STEPS WITH RAILING: A LOT
MOVING FROM LYING ON BACK TO SITTING ON SIDE OF FLAT BED: A LOT
WALKING IN HOSPITAL ROOM: A LOT
TURNING FROM BACK TO SIDE WHILE IN FLAT BAD: UNABLE
STANDING UP FROM CHAIR USING ARMS: A LITTLE
MOBILITY SCORE: 11
SUGGESTED CMS G CODE MODIFIER MOBILITY: CL
MOVING TO AND FROM BED TO CHAIR: UNABLE

## 2018-12-15 ASSESSMENT — PAIN SCALES - GENERAL
PAINLEVEL_OUTOF10: 7
PAINLEVEL_OUTOF10: 6

## 2018-12-15 ASSESSMENT — GAIT ASSESSMENTS
ASSISTIVE DEVICE: FRONT WHEEL WALKER
GAIT LEVEL OF ASSIST: UNABLE TO PARTICIPATE
DISTANCE (FEET): 2
DEVIATION: ANTALGIC;STEP TO;DECREASED BASE OF SUPPORT;DECREASED HEEL STRIKE;DECREASED TOE OFF;OTHER (COMMENT)

## 2018-12-15 NOTE — PROGRESS NOTES
[facility] Hospitalist Progress Note    Date of Service: 12/15/2018    Chief Complaint  89 y.o. Female with h/o asthma, HTN, left hip fracture admitted 2018 with right hip pain.  Found to have right hip fracture.  S/P right hip nailing on .    Interval Problem Update  :  POD 1.  Pain controlled.  Very limited mobility of RLE.  PT/OT to follow.  Expresses desire to go home at time of discharge.  VSS.    :  Increased pain of right hip today.  Continues to request to go home at discharge.  Working with PT/OT.  Attempt to improve strength for transfers prior to d/c home.    :  Pain improving.  Encourage OOB daily.  Complaining of frequent urination.      12/15:  Patient upset because she has not been allowed to have albuterol inhaler at bedside.  Informed nursing staff to allow this.  No shortness of breath at this time.  Pain controlled.    Consultants/Specialty  Ortho    Disposition  Anticipate home with hospice when stable.        Review of Systems   Constitutional: Negative for chills, fever and malaise/fatigue.   HENT: Negative for congestion and sore throat.    Eyes: Negative for blurred vision.   Respiratory: Negative for shortness of breath.    Cardiovascular: Negative for chest pain and leg swelling.   Gastrointestinal: Negative for abdominal pain, diarrhea, nausea and vomiting.   Genitourinary: Positive for frequency.   Musculoskeletal: Positive for joint pain and myalgias.   Skin: Negative for rash.   Neurological: Positive for focal weakness and weakness. Negative for dizziness, tingling, tremors, sensory change, speech change and headaches.      Physical Exam  Laboratory/Imaging   Hemodynamics  Temp (24hrs), Av.5 °C (97.7 °F), Min:36.2 °C (97.2 °F), Max:36.8 °C (98.2 °F)   Temperature: 36.8 °C (98.2 °F)  Pulse  Av.5  Min: 56  Max: 119    Blood Pressure : 131/67      Respiratory      Respiration: 18, Pulse Oximetry: 95 %        RUL Breath Sounds: Diminished, RML Breath Sounds:  Diminished, RLL Breath Sounds: Diminished, GARY Breath Sounds: Diminished, LLL Breath Sounds: Diminished    Fluids  No intake or output data in the 24 hours ending 12/15/18 1021    Nutrition  Orders Placed This Encounter   Procedures   • Diet Order Regular     Standing Status:   Standing     Number of Occurrences:   1     Order Specific Question:   Diet:     Answer:   Regular [1]     Physical Exam   Constitutional: She is oriented to person, place, and time. She appears well-developed and well-nourished.   Elderly female.   HENT:   Head: Normocephalic and atraumatic.   Right Ear: External ear normal.   Left Ear: External ear normal.   Eyes: Conjunctivae are normal. No scleral icterus.   Neck: Normal range of motion. No JVD present.   Cardiovascular: Normal rate, regular rhythm, normal heart sounds and intact distal pulses.  Exam reveals no friction rub.    No murmur heard.  Pulmonary/Chest: Effort normal and breath sounds normal. No stridor. No respiratory distress. She has no wheezes. She has no rales.   Abdominal: Soft. Bowel sounds are normal. She exhibits no distension. There is no tenderness. There is no rebound.   Musculoskeletal: She exhibits tenderness.   RLE weakness.   Neurological: She is alert and oriented to person, place, and time. No cranial nerve deficit.   Skin: Skin is warm and dry. She is not diaphoretic. No erythema.   Psychiatric: She has a normal mood and affect.   Nursing note and vitals reviewed.      Recent Labs      12/13/18 0418 12/14/18 0415   WBC  8.8  8.1   RBC  3.31*  3.22*   HEMOGLOBIN  10.0*  9.6*   HEMATOCRIT  29.6*  29.6*   MCV  89.4  91.9   MCH  30.2  29.8   MCHC  33.8  32.4*   RDW  45.6  47.8   PLATELETCT  205  215   MPV  9.7  10.1     Recent Labs      12/13/18 0418  12/14/18 0415   SODIUM  133*  133*   POTASSIUM  3.5*  4.1   CHLORIDE  98  99   CO2  33  30   GLUCOSE  152*  156*   BUN  14  11   CREATININE  0.80  0.69   CALCIUM  9.0  9.0                      Assessment/Plan      * Closed intertrochanteric fracture of right femur (HCC)- (present on admission)   Assessment & Plan    S/P right hip nailing on 12/11  Continue heparin  Pain improving.  PT/OT following.  Ortho following.  Patient wanting to go home.  Was on hospice prior to admit.  Anticipate therapy in the hospital and discharge home with hospice.  Will consider SNF if weakness does not improve.       Frailty syndrome in geriatric patient- (present on admission)   Assessment & Plan    Nutrition following.   Encourage oral intake.  PT/OT following.  Anticipate home with hospice at d/c.     Type 2 diabetes mellitus with kidney complication, without long-term current use of insulin ([facility])- (present on admission)   Assessment & Plan    Diet-controlled     Frequent falls- (present on admission)   Assessment & Plan    PT/OT following.     Hypokalemia- (present on admission)   Assessment & Plan    Replaced.  Follow bmp.       Closed intertrochanteric fracture of left femur (HCC)- (present on admission)   Assessment & Plan    s/p IM nailing 11/18/2018  PT/OT     Urinary retention- (present on admission)   Assessment & Plan    Avery catheter removed.  Resolved.     Chronic kidney disease, stage III (moderate) ([facility])- (present on admission)   Assessment & Plan    Follow BMP     Vitamin D insufficiency- (present on admission)   Assessment & Plan    Oral replacement.     HTN (hypertension)- (present on admission)   Assessment & Plan    Continue Lisinopril, Norvasc     COPD (chronic obstructive pulmonary disease) ([facility])- (present on admission)   Assessment & Plan    No acute exacerbation.  RT protocol, encourage IS     Asthma, moderate persistent, poorly-controlled- (present on admission)   Assessment & Plan    Not in acute exacerbation.  Continue inhalers and RT protocol.     Acute blood loss anemia   Assessment & Plan    Likely secondary to surgery   Currently with no evidence of active bleed.  Replacing folic acid.  Iron panel  pending.  Follow CBC.  Transfuse for hgb < 7     Folic acid deficiency   Assessment & Plan    Continue oral replacement.     Depression- (present on admission)   Assessment & Plan    Zoloft       Quality-Core Measures   Reviewed items::  Labs reviewed and Medications reviewed  Avery catheter::  No Avery  DVT prophylaxis pharmacological::  Heparin

## 2018-12-15 NOTE — THERAPY
"Physical Therapy Treatment completed.   Bed Mobility:  Supine to Sit: Minimal Assist  Transfers: Sit to Stand: Minimal Assist  Gait: Level Of Assist: Unable to Participate; initiation of pre-gait/weightshifting and 1-2 steps forward/back with FWW and mod A (not true gait mechanics currently)    Plan of Care: Will benefit from Physical Therapy 3 times per week  Discharge Recommendations: Equipment: Will Continue to Assess for Equipment Needs. See below    Pt progressing slowly though as expected. She was able to demosntrate bed mobility with min A, sit<>stands with min/mod A, stand step transfers with min/mod A, and intiaition of gait/pre-gait with FWW with mod A and cueing. She is in part limited 2' to pain/fear of pain and general apprehension with WB RLE activity. Noted she is able to demonstrate WB at RLE with limited c/o when distracted from task though when focused on task, pt very apprehensive with WB activity. She is impulsive with response to fear of pain. She will benefit from continued acute PT while here with current recommendation of continued skilled PT/placement prior to medical dc to home for optimal functional progression and to reduce caregiver burden. However pt and dtr express strong desire to dc to home. Pt would need to be able to demonstrate bed mobility and transfers with CGA/min A from caregiver at minimum in order to dc to home and caregiver needs to verify ability to assist with all ADLs and IADls if pt was in current condition. WIll continue to visit.     See \"Rehab Therapy-Acute\" Patient Summary Report for complete documentation.       "

## 2018-12-15 NOTE — PROGRESS NOTES
"   Orthopaedic Progress Note    Interval changes:  Dressing changed/replaced due to sanguinous exudate  Family adamant about wanting to DC home- must clear PT    ROS - Patient denies any new issues.  Pain well controlled.    Blood pressure 131/67, pulse 76, temperature 36.8 °C (98.2 °F), temperature source Oral, resp. rate 18, height 1.6 m (5' 3\"), weight 59 kg (130 lb 1.1 oz), SpO2 95 %, not currently breastfeeding.      Patient seen and examined  No acute distress  Breathing non labored  RRR  Right hip dressing changed/replaced due to sanguinous exudate, surgical incisions are well approximated and are dry and clean.  There is no erythema, induration, or signs of infection at any of the incision sites, DNVI, moves all toes, cap refill < 2 sec.     Recent Labs      12/13/18   0418  12/14/18   0415   WBC  8.8  8.1   RBC  3.31*  3.22*   HEMOGLOBIN  10.0*  9.6*   HEMATOCRIT  29.6*  29.6*   MCV  89.4  91.9   MCH  30.2  29.8   MCHC  33.8  32.4*   RDW  45.6  47.8   PLATELETCT  205  215   MPV  9.7  10.1       Active Hospital Problems    Diagnosis   • Closed intertrochanteric fracture of right femur ([facility]) [S72.141A]     Priority: High   • Frailty syndrome in geriatric patient [R54]     Priority: High   • Frequent falls [R29.6]     Priority: Medium   • Type 2 diabetes mellitus with kidney complication, without long-term current use of insulin ([facility]) [E11.29]     Priority: Medium   • Hypokalemia [E87.6]     Priority: Medium   • Closed intertrochanteric fracture of left femur ([facility]) [S72.142A]     Priority: Medium   • Urinary retention [R33.9]     Priority: Medium   • Chronic kidney disease, stage III (moderate) ([facility]) [N18.3]     Priority: Medium   • COPD (chronic obstructive pulmonary disease) ([facility]) [J44.9]     Priority: Medium   • HTN (hypertension) [I10]     Priority: Medium   • Vitamin D insufficiency [E55.9]     Priority: Medium   • Asthma, moderate persistent, poorly-controlled [J45.40]     Priority: Medium   • Folic acid " deficiency [E53.8]     Priority: Low   • Depression [F32.9]     Priority: Low   • Acute blood loss anemia [D62]       Assessment/Plan:  Pending therapy clearance for DC home  POD#4 S/P Right intramedullary hip screw  Wt bearing status - WBAT  Wound care/Drains - dressing changed today  Future Procedures - none planned   Sutures/Staples out- 10-14 days post operatively  PT/OT-initiated  Antibiotics: completed  DVT Prophylaxis- TEDS/SCDs/Foot pumps  Avery-none  Case Coordination for Discharge Planning - Disposition home if cleared by therapy

## 2018-12-15 NOTE — PROGRESS NOTES
Shift report received from night RN, assumed care at 0715. Patient resting in bed, routinely medicated prn per MAR. AOx4, up with assist, calls appropriately, bed alarm in place. PIV assessed locked. Dressing with some old drainage and bruising, O2 3L nasal cannula, SPO2 97%, O2 at baseline L/hr. VTE heparin. Plan is [facility] at discharge, has approved, awaiting med clearence. Needs met at this time.    Discussed POC for multimodal pain management, monitoring VS/labs/I&O, mobility, day time routine, comfort, and safety. Patient has call light and personal belongings within reach. Safety and fall precautions in place. Reviewed current labs, notes, medications, and orders. Hourly rounding in place with RN and CNA.

## 2018-12-15 NOTE — ASSESSMENT & PLAN NOTE
Likely secondary to surgery   Currently with no evidence of active bleed.  Replacing folic acid and iron.  Follow CBC.  Transfuse for hgb < 7

## 2018-12-15 NOTE — CARE PLAN
Problem: Communication  Goal: The ability to communicate needs accurately and effectively will improve  Outcome: PROGRESSING AS EXPECTED  Report given bedside. Pt updated on plan of care. Pt verbalizes understanding. Pt encouraged to voice needs and concerns. Communication board in use. Call light within reach. Hourly rounding in place. Pt has no needs or concerns at this time.     Problem: Safety  Goal: Will remain free from injury  Outcome: PROGRESSING AS EXPECTED  Bed alarm in place. Pt educated on need to call before getting out of bed. Pt verbalizes understanding. Treaded socks on pt. Bed locked. DME in bathroom. Room safety check. Bed in lowest position. Call light with in reach. Hourly rounding in place.

## 2018-12-15 NOTE — DOCUMENTATION QUERY
DOCUMENTATION QUERY    PROVIDERS: Please select “Cosign w/ note”to reply to query.    To better represent the severity of illness of your patient, please review the following information and exercise your independent professional judgment in responding to this query.     Low hemoglobin levels are noted in the Lab Results. Based upon the clinical findings, risk factors, and treatment, can a diagnosis be provided to support this finding?    • Acute blood loss anemia  • Chronic blood loss anemia  • Deficiency anemia (please specify type)  • Anemia of chronic kidney disease  • Macrocytic anemia  • Microcytic anemia  • Normocytic anemia   • Other type of anemia (please specify type)  • Finding of no clinical significance   • Other explanation of clinical findings  • Unable to determine    The medical record reflects the following:   Clinical Findings Hg 12.0, 10.5, 10.0, 9.6   Treatment Monitor labs results    Risk Factors Advanced age, closed intertrochanteric right femur fracture s/p intramedullary nailing, CKD III, folic acid deficiency, HTN, COPD, & DM2   Location within medical record History & Physical, Progress Notes, & Lab Results      Thank you,   Jennifer Maharaj RN  Clinical   Phone [phone]

## 2018-12-16 PROBLEM — E61.1 IRON DEFICIENCY: Status: ACTIVE | Noted: 2018-12-16

## 2018-12-16 LAB
ERYTHROCYTE [DISTWIDTH] IN BLOOD BY AUTOMATED COUNT: 46.1 FL (ref 35.9–50)
ERYTHROCYTE [DISTWIDTH] IN BLOOD BY AUTOMATED COUNT: 46.5 FL (ref 35.9–50)
HCT VFR BLD AUTO: 30.8 % (ref 37–47)
HCT VFR BLD AUTO: 33.5 % (ref 37–47)
HGB BLD-MCNC: 10.9 G/DL (ref 12–16)
HGB BLD-MCNC: 9.8 G/DL (ref 12–16)
MCH RBC QN AUTO: 29.4 PG (ref 27–33)
MCH RBC QN AUTO: 29.9 PG (ref 27–33)
MCHC RBC AUTO-ENTMCNC: 31.8 G/DL (ref 33.6–35)
MCHC RBC AUTO-ENTMCNC: 32.5 G/DL (ref 33.6–35)
MCV RBC AUTO: 91.8 FL (ref 81.4–97.8)
MCV RBC AUTO: 92.5 FL (ref 81.4–97.8)
PLATELET # BLD AUTO: 271 K/UL (ref 164–446)
PLATELET # BLD AUTO: 357 K/UL (ref 164–446)
PMV BLD AUTO: 10 FL (ref 9–12.9)
PMV BLD AUTO: 9.8 FL (ref 9–12.9)
RBC # BLD AUTO: 3.33 M/UL (ref 4.2–5.4)
RBC # BLD AUTO: 3.65 M/UL (ref 4.2–5.4)
WBC # BLD AUTO: 7.8 K/UL (ref 4.8–10.8)
WBC # BLD AUTO: 8.9 K/UL (ref 4.8–10.8)

## 2018-12-16 PROCEDURE — 700102 HCHG RX REV CODE 250 W/ 637 OVERRIDE(OP): Performed by: NURSE PRACTITIONER

## 2018-12-16 PROCEDURE — A9270 NON-COVERED ITEM OR SERVICE: HCPCS | Performed by: NURSE PRACTITIONER

## 2018-12-16 PROCEDURE — 85027 COMPLETE CBC AUTOMATED: CPT | Mod: 91

## 2018-12-16 PROCEDURE — 700111 HCHG RX REV CODE 636 W/ 250 OVERRIDE (IP): Performed by: FAMILY MEDICINE

## 2018-12-16 PROCEDURE — 99232 SBSQ HOSP IP/OBS MODERATE 35: CPT | Performed by: HOSPITALIST

## 2018-12-16 PROCEDURE — 700111 HCHG RX REV CODE 636 W/ 250 OVERRIDE (IP): Performed by: HOSPITALIST

## 2018-12-16 PROCEDURE — 36415 COLL VENOUS BLD VENIPUNCTURE: CPT

## 2018-12-16 PROCEDURE — A9270 NON-COVERED ITEM OR SERVICE: HCPCS | Performed by: FAMILY MEDICINE

## 2018-12-16 PROCEDURE — 700102 HCHG RX REV CODE 250 W/ 637 OVERRIDE(OP): Performed by: FAMILY MEDICINE

## 2018-12-16 PROCEDURE — 770001 HCHG ROOM/CARE - MED/SURG/GYN PRIV*

## 2018-12-16 RX ORDER — PYRIDOXINE HCL (VITAMIN B6) 50 MG
50 TABLET ORAL DAILY
Status: DISCONTINUED | OUTPATIENT
Start: 2018-12-17 | End: 2018-12-19 | Stop reason: HOSPADM

## 2018-12-16 RX ORDER — ASCORBIC ACID 500 MG
500 TABLET ORAL DAILY
Status: DISCONTINUED | OUTPATIENT
Start: 2018-12-16 | End: 2018-12-19 | Stop reason: HOSPADM

## 2018-12-16 RX ORDER — FERROUS SULFATE 325(65) MG
325 TABLET ORAL
Status: DISCONTINUED | OUTPATIENT
Start: 2018-12-16 | End: 2018-12-19 | Stop reason: HOSPADM

## 2018-12-16 RX ADMIN — CYANOCOBALAMIN TAB 500 MCG 1000 MCG: 500 TAB at 04:49

## 2018-12-16 RX ADMIN — ENALAPRILAT 1.25 MG: 1.25 INJECTION INTRAVENOUS at 04:53

## 2018-12-16 RX ADMIN — PREDNISONE 5 MG: 5 TABLET ORAL at 07:54

## 2018-12-16 RX ADMIN — ALBUTEROL SULFATE 2 PUFF: 90 AEROSOL, METERED RESPIRATORY (INHALATION) at 10:51

## 2018-12-16 RX ADMIN — ACETAMINOPHEN 650 MG: 325 TABLET, FILM COATED ORAL at 10:54

## 2018-12-16 RX ADMIN — LISINOPRIL 60 MG: 20 TABLET ORAL at 04:49

## 2018-12-16 RX ADMIN — AMLODIPINE BESYLATE 5 MG: 5 TABLET ORAL at 17:26

## 2018-12-16 RX ADMIN — HEPARIN SODIUM 5000 UNITS: 5000 INJECTION, SOLUTION INTRAVENOUS; SUBCUTANEOUS at 13:45

## 2018-12-16 RX ADMIN — PYRIDOXINE HYDROCHLORIDE 100 MG: 100 INJECTION, SOLUTION INTRAMUSCULAR; INTRAVENOUS at 14:01

## 2018-12-16 RX ADMIN — ACETAMINOPHEN 650 MG: 325 TABLET, FILM COATED ORAL at 04:49

## 2018-12-16 RX ADMIN — METOPROLOL TARTRATE 12.5 MG: 25 TABLET, FILM COATED ORAL at 09:03

## 2018-12-16 RX ADMIN — METOPROLOL TARTRATE 12.5 MG: 25 TABLET, FILM COATED ORAL at 17:27

## 2018-12-16 RX ADMIN — AMLODIPINE BESYLATE 5 MG: 5 TABLET ORAL at 04:49

## 2018-12-16 RX ADMIN — OXYCODONE HYDROCHLORIDE 5 MG: 5 TABLET ORAL at 17:26

## 2018-12-16 RX ADMIN — ACETAMINOPHEN 650 MG: 325 TABLET, FILM COATED ORAL at 17:27

## 2018-12-16 RX ADMIN — STANDARDIZED SENNA CONCENTRATE AND DOCUSATE SODIUM 2 TABLET: 8.6; 5 TABLET, FILM COATED ORAL at 04:49

## 2018-12-16 RX ADMIN — SERTRALINE 200 MG: 100 TABLET, FILM COATED ORAL at 09:03

## 2018-12-16 RX ADMIN — OXYCODONE HYDROCHLORIDE AND ACETAMINOPHEN 500 MG: 500 TABLET ORAL at 09:03

## 2018-12-16 RX ADMIN — FERROUS SULFATE TAB 325 MG (65 MG ELEMENTAL FE) 325 MG: 325 (65 FE) TAB at 09:03

## 2018-12-16 RX ADMIN — FOLIC ACID 1 MG: 1 TABLET ORAL at 04:49

## 2018-12-16 RX ADMIN — HEPARIN SODIUM 5000 UNITS: 5000 INJECTION, SOLUTION INTRAVENOUS; SUBCUTANEOUS at 04:49

## 2018-12-16 ASSESSMENT — ENCOUNTER SYMPTOMS
BLURRED VISION: 0
DIARRHEA: 0
PALPITATIONS: 0
CHILLS: 0
ABDOMINAL PAIN: 0
DIZZINESS: 0
FEVER: 0
DOUBLE VISION: 0
CONSTIPATION: 0
NAUSEA: 0
TREMORS: 0
FOCAL WEAKNESS: 1
SHORTNESS OF BREATH: 0
TINGLING: 0
COUGH: 0
SENSORY CHANGE: 0
WEAKNESS: 1
VOMITING: 0
HEADACHES: 0

## 2018-12-16 ASSESSMENT — PAIN SCALES - GENERAL
PAINLEVEL_OUTOF10: 7
PAINLEVEL_OUTOF10: 4
PAINLEVEL_OUTOF10: 8

## 2018-12-16 NOTE — CARE PLAN
Problem: Communication  Goal: The ability to communicate needs accurately and effectively will improve    Intervention: [place] patient and significant other/support system to call light to alert staff of needs  Call light within reach, educated to alert staff to any needs.       Problem: Skin Integrity  Goal: Risk for impaired skin integrity will decrease    Intervention: Implement precautions to protect skin integrity in collaboration with the interdisciplinary team  Q2hr turns in place, waffle cushion in use, checked for incontinence frequently, barrier cream in use.

## 2018-12-16 NOTE — PROGRESS NOTES
"[facility] Hospitalist Progress Note    Date of Service: 2018    Chief Complaint  89 y.o. Female with h/o asthma, HTN, left hip fracture admitted 2018 with right hip pain.  Found to have right hip fracture.  S/P right hip nailing on .    Interval Problem Update  :  POD 1.  Pain controlled.  Very limited mobility of RLE.  PT/OT to follow.  Expresses desire to go home at time of discharge.  VSS.    :  Increased pain of right hip today.  Continues to request to go home at discharge.  Working with PT/OT.  Attempt to improve strength for transfers prior to d/c home.    :  Pain improving.  Encourage OOB daily.  Complaining of frequent urination.      12/15:  Patient upset because she has not been allowed to have albuterol inhaler at bedside.  Informed nursing staff to allow this.  No shortness of breath at this time.  Pain controlled.    :  Flat affect this am.  States \"I'm tired of being here\".  Pain controlled.  Encouraged to mobilize.    Consultants/Specialty  Ortho    Disposition  Anticipate home with hospice when stable.        Review of Systems   Constitutional: Negative for chills and fever.   HENT: Negative for congestion.    Eyes: Negative for blurred vision and double vision.   Respiratory: Negative for cough and shortness of breath.    Cardiovascular: Negative for chest pain, palpitations and leg swelling.   Gastrointestinal: Negative for abdominal pain, constipation, diarrhea, nausea and vomiting.   Genitourinary: Negative for dysuria.   Musculoskeletal: Positive for joint pain.   Skin: Negative for itching and rash.   Neurological: Positive for focal weakness and weakness. Negative for dizziness, tingling, tremors, sensory change and headaches.      Physical Exam  Laboratory/Imaging   Hemodynamics  Temp (24hrs), Av.8 °C (98.3 °F), Min:36.5 °C (97.7 °F), Max:37.1 °C (98.8 °F)   Temperature: 36.8 °C (98.2 °F)  Pulse  Av.3  Min: 56  Max: 119    Blood Pressure : (!) " 174/75 (Informed RN)      Respiratory      Respiration: 19, Pulse Oximetry: 96 %     Work Of Breathing / Effort: Mild  RUL Breath Sounds: Diminished;Clear, RML Breath Sounds: Diminished;Clear, RLL Breath Sounds: Diminished;Clear, GARY Breath Sounds: Diminished;Clear, LLL Breath Sounds: Diminished;Clear    Fluids    Intake/Output Summary (Last 24 hours) at 12/16/18 1141  Last data filed at 12/16/18 0730   Gross per 24 hour   Intake              120 ml   Output                0 ml   Net              120 ml       Nutrition  Orders Placed This Encounter   Procedures   • Diet Order Regular     Standing Status:   Standing     Number of Occurrences:   1     Order Specific Question:   Diet:     Answer:   Regular [1]     Physical Exam   Constitutional: She is oriented to person, place, and time. She appears well-developed and well-nourished. No distress.   Elderly female.   HENT:   Head: Normocephalic and atraumatic.   Mouth/Throat: No oropharyngeal exudate.   Eyes: Conjunctivae are normal. Right eye exhibits no discharge. Left eye exhibits no discharge.   Neck: Normal range of motion. No tracheal deviation present.   Cardiovascular: Normal rate, regular rhythm, normal heart sounds and intact distal pulses.  Exam reveals no friction rub.    No murmur heard.  Pulmonary/Chest: Effort normal and breath sounds normal. No stridor. No respiratory distress. She has no wheezes.   Abdominal: Soft. Bowel sounds are normal. She exhibits no distension. There is no tenderness.   Musculoskeletal: She exhibits tenderness. She exhibits no deformity.   RLE weakness.   Neurological: She is alert and oriented to person, place, and time.   Skin: Skin is warm and dry. No rash noted. She is not diaphoretic. No erythema.   Psychiatric: She has a normal mood and affect.   Nursing note and vitals reviewed.      Recent Labs      12/14/18   0415  12/16/18   0523   WBC  8.1  7.8   RBC  3.22*  3.33*   HEMOGLOBIN  9.6*  9.8*   HEMATOCRIT  29.6*  30.8*    MCV  91.9  92.5   MCH  29.8  29.4   MCHC  32.4*  31.8*   RDW  47.8  46.1   PLATELETCT  215  271   MPV  10.1  10.0     Recent Labs      12/14/18   0415   SODIUM  133*   POTASSIUM  4.1   CHLORIDE  99   CO2  30   GLUCOSE  156*   BUN  11   CREATININE  0.69   CALCIUM  9.0                      Assessment/Plan     * Closed intertrochanteric fracture of right femur (HCC)- (present on admission)   Assessment & Plan    S/P right hip nailing on 12/11  Continue heparin  Pain controlled.  PT/OT following.  Ortho following.  Patient wanting to go home.  Was on hospice prior to admit.  Anticipate therapy in the hospital and discharge home with hospice.  Will consider SNF if weakness does not improve.       Frailty syndrome in geriatric patient- (present on admission)   Assessment & Plan    Nutrition following.   Encourage oral intake.  PT/OT following.  Anticipate home with hospice at d/c.     Type 2 diabetes mellitus with kidney complication, without long-term current use of insulin ([facility])- (present on admission)   Assessment & Plan    Diet-controlled     Frequent falls- (present on admission)   Assessment & Plan    PT/OT following.     Hypokalemia- (present on admission)   Assessment & Plan    Replaced.  Follow bmp.       Closed intertrochanteric fracture of left femur (HCC)- (present on admission)   Assessment & Plan    s/p IM nailing 11/18/2018  PT/OT     Urinary retention- (present on admission)   Assessment & Plan    Avery catheter removed.  Resolved.     Chronic kidney disease, stage III (moderate) ([facility])- (present on admission)   Assessment & Plan    Follow BMP     Vitamin D insufficiency- (present on admission)   Assessment & Plan    Oral replacement.     HTN (hypertension)- (present on admission)   Assessment & Plan    Uncontrolled.  Start metoprolol.  Continue Lisinopril, Norvasc     COPD (chronic obstructive pulmonary disease) ([facility])- (present on admission)   Assessment & Plan    No acute exacerbation.  RT protocol,  encourage IS     Asthma, moderate persistent, poorly-controlled- (present on admission)   Assessment & Plan    Not in acute exacerbation.  Continue inhalers and RT protocol.     Acute blood loss anemia   Assessment & Plan    Likely secondary to surgery   Currently with no evidence of active bleed.  Replacing folic acid and iron.  Follow CBC.  Transfuse for hgb < 7     Iron deficiency   Assessment & Plan    Start replacement.       Folic acid deficiency   Assessment & Plan    Continue oral replacement.     Depression- (present on admission)   Assessment & Plan    Zoloft       Quality-Core Measures   Reviewed items::  Labs reviewed and Medications reviewed  Avery catheter::  No Avery  DVT prophylaxis pharmacological::  Heparin

## 2018-12-16 NOTE — PROGRESS NOTES
Notified Dr. Magana regarding blood pressure 175/88 despite vasotec 1.25 mg administered at 0453, BEBE Hair to place orders

## 2018-12-17 LAB
APPEARANCE UR: ABNORMAL
BACTERIA #/AREA URNS HPF: NEGATIVE /HPF
BILIRUB UR QL STRIP.AUTO: NEGATIVE
COLOR UR: YELLOW
EPI CELLS #/AREA URNS HPF: NEGATIVE /HPF
GLUCOSE UR STRIP.AUTO-MCNC: NEGATIVE MG/DL
HYALINE CASTS #/AREA URNS LPF: NORMAL /LPF
KETONES UR STRIP.AUTO-MCNC: NEGATIVE MG/DL
LEUKOCYTE ESTERASE UR QL STRIP.AUTO: NEGATIVE
MICRO URNS: ABNORMAL
NITRITE UR QL STRIP.AUTO: NEGATIVE
PH UR STRIP.AUTO: 7.5 [PH]
PROT UR QL STRIP: NEGATIVE MG/DL
RBC # URNS HPF: NORMAL /HPF
RBC UR QL AUTO: NEGATIVE
SP GR UR STRIP.AUTO: 1.01
UROBILINOGEN UR STRIP.AUTO-MCNC: 0.2 MG/DL
WBC #/AREA URNS HPF: NORMAL /HPF

## 2018-12-17 PROCEDURE — A9270 NON-COVERED ITEM OR SERVICE: HCPCS | Performed by: HOSPITALIST

## 2018-12-17 PROCEDURE — 770001 HCHG ROOM/CARE - MED/SURG/GYN PRIV*

## 2018-12-17 PROCEDURE — 700102 HCHG RX REV CODE 250 W/ 637 OVERRIDE(OP): Performed by: FAMILY MEDICINE

## 2018-12-17 PROCEDURE — A9270 NON-COVERED ITEM OR SERVICE: HCPCS | Performed by: FAMILY MEDICINE

## 2018-12-17 PROCEDURE — 302146: Performed by: HOSPITALIST

## 2018-12-17 PROCEDURE — 97530 THERAPEUTIC ACTIVITIES: CPT

## 2018-12-17 PROCEDURE — A9270 NON-COVERED ITEM OR SERVICE: HCPCS | Performed by: NURSE PRACTITIONER

## 2018-12-17 PROCEDURE — 700102 HCHG RX REV CODE 250 W/ 637 OVERRIDE(OP): Performed by: HOSPITALIST

## 2018-12-17 PROCEDURE — 700102 HCHG RX REV CODE 250 W/ 637 OVERRIDE(OP): Performed by: NURSE PRACTITIONER

## 2018-12-17 PROCEDURE — 81001 URINALYSIS AUTO W/SCOPE: CPT

## 2018-12-17 PROCEDURE — 97535 SELF CARE MNGMENT TRAINING: CPT

## 2018-12-17 PROCEDURE — 99232 SBSQ HOSP IP/OBS MODERATE 35: CPT | Performed by: HOSPITALIST

## 2018-12-17 PROCEDURE — 700111 HCHG RX REV CODE 636 W/ 250 OVERRIDE (IP): Performed by: FAMILY MEDICINE

## 2018-12-17 RX ADMIN — ACETAMINOPHEN 650 MG: 325 TABLET, FILM COATED ORAL at 17:29

## 2018-12-17 RX ADMIN — METOPROLOL TARTRATE 12.5 MG: 25 TABLET, FILM COATED ORAL at 17:29

## 2018-12-17 RX ADMIN — STANDARDIZED SENNA CONCENTRATE AND DOCUSATE SODIUM 2 TABLET: 8.6; 5 TABLET, FILM COATED ORAL at 17:29

## 2018-12-17 RX ADMIN — AMLODIPINE BESYLATE 5 MG: 5 TABLET ORAL at 05:11

## 2018-12-17 RX ADMIN — HEPARIN SODIUM 5000 UNITS: 5000 INJECTION, SOLUTION INTRAVENOUS; SUBCUTANEOUS at 14:40

## 2018-12-17 RX ADMIN — LISINOPRIL 60 MG: 20 TABLET ORAL at 05:10

## 2018-12-17 RX ADMIN — OXYCODONE HYDROCHLORIDE AND ACETAMINOPHEN 500 MG: 500 TABLET ORAL at 05:11

## 2018-12-17 RX ADMIN — OXYCODONE HYDROCHLORIDE 5 MG: 5 TABLET ORAL at 14:01

## 2018-12-17 RX ADMIN — ACETAMINOPHEN 650 MG: 325 TABLET, FILM COATED ORAL at 09:32

## 2018-12-17 RX ADMIN — OXYCODONE HYDROCHLORIDE 5 MG: 5 TABLET ORAL at 22:40

## 2018-12-17 RX ADMIN — HEPARIN SODIUM 5000 UNITS: 5000 INJECTION, SOLUTION INTRAVENOUS; SUBCUTANEOUS at 22:39

## 2018-12-17 RX ADMIN — FOLIC ACID 1 MG: 1 TABLET ORAL at 05:10

## 2018-12-17 RX ADMIN — METOPROLOL TARTRATE 12.5 MG: 25 TABLET, FILM COATED ORAL at 05:10

## 2018-12-17 RX ADMIN — SERTRALINE 200 MG: 100 TABLET, FILM COATED ORAL at 08:20

## 2018-12-17 RX ADMIN — OXYCODONE HYDROCHLORIDE 5 MG: 5 TABLET ORAL at 05:11

## 2018-12-17 RX ADMIN — HEPARIN SODIUM 5000 UNITS: 5000 INJECTION, SOLUTION INTRAVENOUS; SUBCUTANEOUS at 05:11

## 2018-12-17 RX ADMIN — STANDARDIZED SENNA CONCENTRATE AND DOCUSATE SODIUM 2 TABLET: 8.6; 5 TABLET, FILM COATED ORAL at 05:10

## 2018-12-17 RX ADMIN — FERROUS SULFATE TAB 325 MG (65 MG ELEMENTAL FE) 325 MG: 325 (65 FE) TAB at 08:20

## 2018-12-17 RX ADMIN — CYANOCOBALAMIN TAB 500 MCG 1000 MCG: 500 TAB at 05:10

## 2018-12-17 RX ADMIN — Medication 50 MG: at 05:10

## 2018-12-17 RX ADMIN — PREDNISONE 5 MG: 5 TABLET ORAL at 08:20

## 2018-12-17 ASSESSMENT — COGNITIVE AND FUNCTIONAL STATUS - GENERAL
DRESSING REGULAR UPPER BODY CLOTHING: A LITTLE
TOILETING: TOTAL
HELP NEEDED FOR BATHING: A LOT
DRESSING REGULAR LOWER BODY CLOTHING: TOTAL
DAILY ACTIVITIY SCORE: 15
SUGGESTED CMS G CODE MODIFIER DAILY ACTIVITY: CK

## 2018-12-17 ASSESSMENT — ENCOUNTER SYMPTOMS
SORE THROAT: 0
SENSORY CHANGE: 0
DIZZINESS: 0
TREMORS: 0
SPEECH CHANGE: 0
FEVER: 0
TINGLING: 0
ABDOMINAL PAIN: 0
NAUSEA: 0
CHILLS: 0
SHORTNESS OF BREATH: 0
DIARRHEA: 0
HEADACHES: 0
WEAKNESS: 1
FOCAL WEAKNESS: 1
VOMITING: 0
BLURRED VISION: 0

## 2018-12-17 ASSESSMENT — PAIN SCALES - GENERAL
PAINLEVEL_OUTOF10: 3
PAINLEVEL_OUTOF10: 3
PAINLEVEL_OUTOF10: 6
PAINLEVEL_OUTOF10: 7
PAINLEVEL_OUTOF10: 8

## 2018-12-17 NOTE — PROGRESS NOTES
"   Orthopaedic Progress Note    Interval changes:  Dressing with min sanguinous exudate proximally   Physical therapy clearance for DC home still pending    ROS - Patient denies any new issues.  Pain well controlled.    Blood pressure 151/79, pulse 79, temperature 37.1 °C (98.7 °F), temperature source Temporal, resp. rate 19, height 1.6 m (5' 3\"), weight 59 kg (130 lb 1.1 oz), SpO2 97 %, not currently breastfeeding.      Patient seen and examined  No acute distress  Breathing non labored  RRR  Right hip dressing with min sanguinous exudate proximally, DNVI, moves all toes, cap refill < 2 sec.     Recent Labs      12/14/18   0415  12/16/18   0523  12/16/18   1239   WBC  8.1  7.8  8.9   RBC  3.22*  3.33*  3.65*   HEMOGLOBIN  9.6*  9.8*  10.9*   HEMATOCRIT  29.6*  30.8*  33.5*   MCV  91.9  92.5  91.8   MCH  29.8  29.4  29.9   MCHC  32.4*  31.8*  32.5*   RDW  47.8  46.1  46.5   PLATELETCT  215  271  357   MPV  10.1  10.0  9.8       Active Hospital Problems    Diagnosis   • Closed intertrochanteric fracture of right femur ([facility]) [S72.141A]     Priority: High   • Frailty syndrome in geriatric patient [R54]     Priority: High   • Frequent falls [R29.6]     Priority: Medium   • Type 2 diabetes mellitus with kidney complication, without long-term current use of insulin ([facility]) [E11.29]     Priority: Medium   • Hypokalemia [E87.6]     Priority: Medium   • Closed intertrochanteric fracture of left femur ([facility]) [S72.142A]     Priority: Medium   • Urinary retention [R33.9]     Priority: Medium   • Chronic kidney disease, stage III (moderate) ([facility]) [N18.3]     Priority: Medium   • COPD (chronic obstructive pulmonary disease) ([facility]) [J44.9]     Priority: Medium   • HTN (hypertension) [I10]     Priority: Medium   • Vitamin D insufficiency [E55.9]     Priority: Medium   • Asthma, moderate persistent, poorly-controlled [J45.40]     Priority: Medium   • Iron deficiency [E61.1]     Priority: Low   • Folic acid deficiency [E53.8]     " Priority: Low   • Depression [F32.9]     Priority: Low   • Acute blood loss anemia [D62]       Assessment/Plan:  Pending therapy clearance for DC home  POD#5 S/P Right intramedullary hip screw  Wt bearing status - WBAT  Wound care/Drains - dressing left in place  Future Procedures - none planned   Sutures/Staples out- 10-14 days post operatively  PT/OT-initiated  Antibiotics: completed  DVT Prophylaxis- TEDS/SCDs/Foot pumps  Avery-none  Case Coordination for Discharge Planning - Disposition home if cleared by therapy

## 2018-12-17 NOTE — CARE PLAN
Problem: Safety  Goal: Will remain free from injury    Intervention: Provide assistance with mobility  Patient assisted by staff with transfers to bedside coomode and chair. Extensive assist x 2, tolerates well.       Problem: Skin Integrity  Goal: Risk for impaired skin integrity will decrease    Intervention: Assess and monitor skin integrity, appearance and/or temperature  Surgical incision site CHU dressing in place, no drainage noted. Surgical dressing CDI.

## 2018-12-17 NOTE — PROGRESS NOTES
"[facility] Hospitalist Progress Note    Date of Service: 12/17/2018    Chief Complaint  89 y.o. Female with h/o asthma, HTN, left hip fracture admitted 12/11/2018 with right hip pain.  Found to have right hip fracture.  S/P right hip nailing on 12/11.    Interval Problem Update  12/12:  POD 1.  Pain controlled.  Very limited mobility of RLE.  PT/OT to follow.  Expresses desire to go home at time of discharge.  VSS.    12/13:  Increased pain of right hip today.  Continues to request to go home at discharge.  Working with PT/OT.  Attempt to improve strength for transfers prior to d/c home.    12/14:  Pain improving.  Encourage OOB daily.  Complaining of frequent urination.      12/15:  Patient upset because she has not been allowed to have albuterol inhaler at bedside.  Informed nursing staff to allow this.  No shortness of breath at this time.  Pain controlled.    12/16:  Flat affect this am.  States \"I'm tired of being here\".  Pain controlled.  Encouraged to mobilize.    12/17:  No acute changes overnight.  Blood pressure improved.  Continues to be very weak.  Discussed likely need for SNF prior to return home with family at bedside.      Consultants/Specialty  Ortho    Disposition  SNF vs home with hospice.  SW following.        Review of Systems   Constitutional: Positive for malaise/fatigue. Negative for chills and fever.   HENT: Negative for congestion and sore throat.    Eyes: Negative for blurred vision.   Respiratory: Negative for shortness of breath.    Cardiovascular: Negative for chest pain and leg swelling.   Gastrointestinal: Negative for abdominal pain, diarrhea, nausea and vomiting.   Genitourinary: Negative for dysuria and urgency.   Musculoskeletal: Positive for joint pain.   Skin: Negative for rash.   Neurological: Positive for focal weakness and weakness. Negative for dizziness, tingling, tremors, sensory change, speech change and headaches.      Physical Exam  Laboratory/Imaging   Hemodynamics  Temp " (24hrs), Av.9 °C (98.4 °F), Min:36.1 °C (96.9 °F), Max:37.2 °C (99 °F)   Temperature: 36.1 °C (96.9 °F)  Pulse  Av.7  Min: 56  Max: 119    Blood Pressure : 117/74      Respiratory      Respiration: 19, Pulse Oximetry: 94 %     Work Of Breathing / Effort: Mild  RUL Breath Sounds: Diminished, RML Breath Sounds: Diminished, RLL Breath Sounds: Diminished, GARY Breath Sounds: Diminished, LLL Breath Sounds: Diminished    Fluids    Intake/Output Summary (Last 24 hours) at 18 1406  Last data filed at 18 0800   Gross per 24 hour   Intake                0 ml   Output                0 ml   Net                0 ml       Nutrition  Orders Placed This Encounter   Procedures   • Diet Order Regular     Standing Status:   Standing     Number of Occurrences:   1     Order Specific Question:   Diet:     Answer:   Regular [1]     Physical Exam   Constitutional: She is oriented to person, place, and time. She appears well-developed and well-nourished.   Elderly female.   HENT:   Head: Normocephalic and atraumatic.   Right Ear: External ear normal.   Left Ear: External ear normal.   Eyes: Conjunctivae are normal. No scleral icterus.   Neck: Normal range of motion. No JVD present.   Cardiovascular: Normal rate, regular rhythm, normal heart sounds and intact distal pulses.    No murmur heard.  Pulmonary/Chest: Effort normal and breath sounds normal. No stridor. No respiratory distress. She has no wheezes. She has no rales.   Abdominal: Soft. Bowel sounds are normal. She exhibits no distension. There is no tenderness. There is no rebound.   Musculoskeletal: She exhibits tenderness. She exhibits no edema.   RLE weakness.   Neurological: She is alert and oriented to person, place, and time. No cranial nerve deficit.   Skin: Skin is warm and dry. She is not diaphoretic. No erythema.   Psychiatric: She has a normal mood and affect.   Nursing note and vitals reviewed.      Recent Labs      18   0502  18   4403    WBC  7.8  8.9   RBC  3.33*  3.65*   HEMOGLOBIN  9.8*  10.9*   HEMATOCRIT  30.8*  33.5*   MCV  92.5  91.8   MCH  29.4  29.9   MCHC  31.8*  32.5*   RDW  46.1  46.5   PLATELETCT  271  357   MPV  10.0  9.8                          Assessment/Plan     * Closed intertrochanteric fracture of right femur (HCC)- (present on admission)   Assessment & Plan    S/P right hip nailing on 12/11  Continue heparin  Pain controlled.  PT/OT following.  Ortho following.  Patient wanting to go home.  Was on hospice prior to admit.  Patient and family wanting to return home with hospice, but need patient to be able to transfer to [facility].  Continues to be very weak.  May require SNF prior to return home.        Frailty syndrome in geriatric patient- (present on admission)   Assessment & Plan    Nutrition following.   Encourage oral intake.  PT/OT following.  Anticipate home with hospice at d/c.     Type 2 diabetes mellitus with kidney complication, without long-term current use of insulin ([facility])- (present on admission)   Assessment & Plan    Diet-controlled     Frequent falls- (present on admission)   Assessment & Plan    PT/OT following.     Hypokalemia- (present on admission)   Assessment & Plan    Replaced.  Follow bmp.       Closed intertrochanteric fracture of left femur (HCC)- (present on admission)   Assessment & Plan    s/p IM nailing 11/18/2018  PT/OT     Urinary retention- (present on admission)   Assessment & Plan    Avery catheter removed.  Resolved.     Chronic kidney disease, stage III (moderate) ([facility])- (present on admission)   Assessment & Plan    Follow BMP     Vitamin D insufficiency- (present on admission)   Assessment & Plan    Oral replacement.     HTN (hypertension)- (present on admission)   Assessment & Plan    Improved.  Continue Lisinopril, Norvasc, and metoprolol.     COPD (chronic obstructive pulmonary disease) ([facility])- (present on admission)   Assessment & Plan    No acute exacerbation.  RT protocol, encourage IS      Asthma, moderate persistent, poorly-controlled- (present on admission)   Assessment & Plan    Not in acute exacerbation.  Continue inhalers and RT protocol.     Acute blood loss anemia   Assessment & Plan    Likely secondary to surgery   Currently with no evidence of active bleed.  Replacing folic acid and iron.  Follow CBC.  Transfuse for hgb < 7     Iron deficiency   Assessment & Plan    Start replacement.       Folic acid deficiency   Assessment & Plan    Continue oral replacement.     Depression- (present on admission)   Assessment & Plan    Zoloft       Quality-Core Measures   Reviewed items::  Labs reviewed and Medications reviewed  Avery catheter::  No Avery  DVT prophylaxis pharmacological::  Heparin

## 2018-12-17 NOTE — THERAPY
"Occupational Therapy Treatment completed with focus on ADLs, ADL transfers and patient education.  Functional Status: Total A sock donning,  supine > EOB min A (HOB elevated, used rails), stand-pivot transfers min A, toileting total A for hygiene  Plan of Care: Will benefit from Occupational Therapy 3 times per week  Discharge Recommendations:  Equipment Will Continue to Assess for Equipment Needs. Post-acute therapy: Recommend inpatient transitional care services for continued occupational therapy services.     See \"Rehab Therapy-Acute\" Patient Summary Report for complete documentation.     Pt seen for OT tx. Received in bed. Pt used bed features to mobilize to EOB with min A. Educated on benefit of hospital bed at home as plan is to resume hospice. Pt performed stand-pivot to BSC with therapist assist (no AD) and min A. Had BM; required total A for thorough hygiene and to avoid soiling hands. Discussed with daughter likelihood that pt would continue to need help with gamal care to ensure proper hygiene. Pt completed stand-pivot from BSC to recliner (180 degree turn) with min A. Recommended to daughter that she only perform quarter turns for transfers for safety. Pt immediately asked if she could go back to bed. Educated on the importance of OOB activity to maintain overall strength and transfer ability. Pt agreed to stay up. Pt demo improved transfers this session. Daughter agrees to complete transfer training with pt tomorrow in anticipation of possible DC home. Acute OT to continue following.   "

## 2018-12-18 PROBLEM — R33.9 URINARY RETENTION: Status: RESOLVED | Noted: 2018-08-14 | Resolved: 2018-12-18

## 2018-12-18 LAB
ERYTHROCYTE [DISTWIDTH] IN BLOOD BY AUTOMATED COUNT: 46.1 FL (ref 35.9–50)
HCT VFR BLD AUTO: 30.2 % (ref 37–47)
HGB BLD-MCNC: 10.2 G/DL (ref 12–16)
MCH RBC QN AUTO: 30.3 PG (ref 27–33)
MCHC RBC AUTO-ENTMCNC: 33.8 G/DL (ref 33.6–35)
MCV RBC AUTO: 89.6 FL (ref 81.4–97.8)
PLATELET # BLD AUTO: 349 K/UL (ref 164–446)
PMV BLD AUTO: 9.5 FL (ref 9–12.9)
RBC # BLD AUTO: 3.37 M/UL (ref 4.2–5.4)
VIT B1 BLD-MCNC: 88 NMOL/L (ref 70–180)
WBC # BLD AUTO: 9.7 K/UL (ref 4.8–10.8)

## 2018-12-18 PROCEDURE — 36415 COLL VENOUS BLD VENIPUNCTURE: CPT

## 2018-12-18 PROCEDURE — 700111 HCHG RX REV CODE 636 W/ 250 OVERRIDE (IP): Performed by: FAMILY MEDICINE

## 2018-12-18 PROCEDURE — 770001 HCHG ROOM/CARE - MED/SURG/GYN PRIV*

## 2018-12-18 PROCEDURE — 85027 COMPLETE CBC AUTOMATED: CPT

## 2018-12-18 PROCEDURE — A9270 NON-COVERED ITEM OR SERVICE: HCPCS | Performed by: NURSE PRACTITIONER

## 2018-12-18 PROCEDURE — A9270 NON-COVERED ITEM OR SERVICE: HCPCS | Performed by: FAMILY MEDICINE

## 2018-12-18 PROCEDURE — 97530 THERAPEUTIC ACTIVITIES: CPT

## 2018-12-18 PROCEDURE — 700102 HCHG RX REV CODE 250 W/ 637 OVERRIDE(OP): Performed by: HOSPITALIST

## 2018-12-18 PROCEDURE — 700102 HCHG RX REV CODE 250 W/ 637 OVERRIDE(OP): Performed by: FAMILY MEDICINE

## 2018-12-18 PROCEDURE — 99232 SBSQ HOSP IP/OBS MODERATE 35: CPT | Performed by: INTERNAL MEDICINE

## 2018-12-18 PROCEDURE — A9270 NON-COVERED ITEM OR SERVICE: HCPCS | Performed by: HOSPITALIST

## 2018-12-18 PROCEDURE — 700102 HCHG RX REV CODE 250 W/ 637 OVERRIDE(OP): Performed by: NURSE PRACTITIONER

## 2018-12-18 RX ADMIN — METOPROLOL TARTRATE 12.5 MG: 25 TABLET, FILM COATED ORAL at 04:07

## 2018-12-18 RX ADMIN — PREDNISONE 5 MG: 5 TABLET ORAL at 08:07

## 2018-12-18 RX ADMIN — SERTRALINE 200 MG: 100 TABLET, FILM COATED ORAL at 08:06

## 2018-12-18 RX ADMIN — FERROUS SULFATE TAB 325 MG (65 MG ELEMENTAL FE) 325 MG: 325 (65 FE) TAB at 08:07

## 2018-12-18 RX ADMIN — CYANOCOBALAMIN TAB 500 MCG 1000 MCG: 500 TAB at 04:06

## 2018-12-18 RX ADMIN — HEPARIN SODIUM 5000 UNITS: 5000 INJECTION, SOLUTION INTRAVENOUS; SUBCUTANEOUS at 17:01

## 2018-12-18 RX ADMIN — METOPROLOL TARTRATE 12.5 MG: 25 TABLET, FILM COATED ORAL at 17:01

## 2018-12-18 RX ADMIN — OXYCODONE HYDROCHLORIDE AND ACETAMINOPHEN 500 MG: 500 TABLET ORAL at 04:06

## 2018-12-18 RX ADMIN — STANDARDIZED SENNA CONCENTRATE AND DOCUSATE SODIUM 2 TABLET: 8.6; 5 TABLET, FILM COATED ORAL at 17:01

## 2018-12-18 RX ADMIN — Medication 50 MG: at 04:06

## 2018-12-18 RX ADMIN — FOLIC ACID 1 MG: 1 TABLET ORAL at 04:06

## 2018-12-18 RX ADMIN — AMLODIPINE BESYLATE 5 MG: 5 TABLET ORAL at 04:06

## 2018-12-18 RX ADMIN — LISINOPRIL 60 MG: 20 TABLET ORAL at 04:06

## 2018-12-18 RX ADMIN — OXYCODONE HYDROCHLORIDE 5 MG: 5 TABLET ORAL at 04:05

## 2018-12-18 RX ADMIN — OXYCODONE HYDROCHLORIDE 5 MG: 5 TABLET ORAL at 17:02

## 2018-12-18 RX ADMIN — HEPARIN SODIUM 5000 UNITS: 5000 INJECTION, SOLUTION INTRAVENOUS; SUBCUTANEOUS at 22:20

## 2018-12-18 RX ADMIN — AMLODIPINE BESYLATE 5 MG: 5 TABLET ORAL at 17:01

## 2018-12-18 RX ADMIN — STANDARDIZED SENNA CONCENTRATE AND DOCUSATE SODIUM 2 TABLET: 8.6; 5 TABLET, FILM COATED ORAL at 04:05

## 2018-12-18 RX ADMIN — HEPARIN SODIUM 5000 UNITS: 5000 INJECTION, SOLUTION INTRAVENOUS; SUBCUTANEOUS at 05:53

## 2018-12-18 RX ADMIN — OXYCODONE HYDROCHLORIDE 5 MG: 5 TABLET ORAL at 20:10

## 2018-12-18 RX ADMIN — OXYCODONE HYDROCHLORIDE 5 MG: 5 TABLET ORAL at 10:56

## 2018-12-18 ASSESSMENT — COGNITIVE AND FUNCTIONAL STATUS - GENERAL
TURNING FROM BACK TO SIDE WHILE IN FLAT BAD: UNABLE
MOBILITY SCORE: 8
WALKING IN HOSPITAL ROOM: TOTAL
MOVING FROM LYING ON BACK TO SITTING ON SIDE OF FLAT BED: A LOT
SUGGESTED CMS G CODE MODIFIER MOBILITY: CM
CLIMB 3 TO 5 STEPS WITH RAILING: TOTAL
MOVING TO AND FROM BED TO CHAIR: UNABLE
STANDING UP FROM CHAIR USING ARMS: A LOT

## 2018-12-18 ASSESSMENT — ENCOUNTER SYMPTOMS
NAUSEA: 0
SHORTNESS OF BREATH: 0
HEADACHES: 0
ABDOMINAL PAIN: 0

## 2018-12-18 ASSESSMENT — PAIN SCALES - GENERAL
PAINLEVEL_OUTOF10: 7
PAINLEVEL_OUTOF10: 7
PAINLEVEL_OUTOF10: 4
PAINLEVEL_OUTOF10: 7
PAINLEVEL_OUTOF10: 0
PAINLEVEL_OUTOF10: 3
PAINLEVEL_OUTOF10: 3
PAINLEVEL_OUTOF10: 7

## 2018-12-18 ASSESSMENT — GAIT ASSESSMENTS
GAIT LEVEL OF ASSIST: MODERATE ASSIST
DISTANCE (FEET): 2
ASSISTIVE DEVICE: FRONT WHEEL WALKER

## 2018-12-18 NOTE — PROGRESS NOTES
2 RN Skin check completed patient noted to have blanchable sacral redness.  Bruising to R hip, flank.  Small facial abrasions.    Mepilex to R Hip.

## 2018-12-18 NOTE — CARE PLAN
Problem: Safety  Goal: Will remain free from injury  Outcome: PROGRESSING AS EXPECTED  Treaded socks in place, bed in the lowest position, bed alarm on, call light and belongings within reach, pt call for assistance appropriately    Problem: Pain Management  Goal: Pain level will decrease to patient's comfort goal  Outcome: PROGRESSING AS EXPECTED  Pt. Taking oxy 5mg PRN with adequate pain control.    Problem: Skin Integrity  Goal: Risk for impaired skin integrity will decrease  Outcome: PROGRESSING AS EXPECTED  q2 turns, waffle cushion in place, applied barrier cream

## 2018-12-18 NOTE — PROGRESS NOTES
Received bedside shift report at 1900.     Pt resting in bed at this time, a&o x4, states pain 7/10. Dressing in place to right hip CDI, pedal pulses 2+, cap refill <3 sec.     Call light and belongings within reach, bed down and locked, hourly rounding in progress.

## 2018-12-18 NOTE — THERAPY
"Pt w/impaired funcitonal mobility. Pt exhibited decreasedstrength, balance, and wt shifting. Pt able to successfully perform STP transfer from EOB to low chair w/therapist, however when it came to family training and incorporating drt help, pt required 2 person assist to STP back to bed w/FWW. Pt would benefit from further acute PT txs to progress towards goals and independence. Recommend post acute placement at an inpatient transitional care facility to address deficits, and ease caregiver burden/risk of injury. Pt and family would like to d/c home on hospice w/family assist, though this appears less optimal per today's session. Will cont to provide caregiver/family training for d/c.    Physical Therapy Treatment completed.   Bed Mobility:  Supine to Sit: Minimal Assist  Transfers: Sit to Stand: Minimal Assist  Gait: Level Of Assist: Moderate Assist (x2 people) with Front-Wheel Walker  To side step from low chair back to bed     Plan of Care: Will benefit from Physical Therapy 3 times per week    See \"Rehab Therapy-Acute\" Patient Summary Report for complete documentation.       "

## 2018-12-18 NOTE — PROGRESS NOTES
[facility] Medicine Daily Progress Note    Date of Service  12/18/2018    Chief Complaint  89 y.o. female admitted 12/11/2018 with fall.    Hospital Course    PMH depression, HTN, asthma and recent left femur fracture who had not been doing well and plced on hospice. She presented with fall and right hip pain. Dr. Middleton was consulted and she had right hip nailing 12/11. She's been slow to mobilize post-op. PT and OT recommened SNF, however daughter elected to take patient home with hospice.        Interval Problem Update  Patient had more difficulty today working with PT, Will try again tomorrow.  Discussed with daughter, she feels patient lacks motivation    Consultants/Specialty  Ortho    Code Status  DNR    Disposition  Home with hospice tomorrow    Review of Systems  Review of Systems   Constitutional: Positive for malaise/fatigue.   HENT: Negative for congestion.    Respiratory: Negative for shortness of breath.    Cardiovascular: Negative for chest pain.   Gastrointestinal: Negative for abdominal pain and nausea.   Musculoskeletal: Positive for joint pain.   Neurological: Negative for headaches.        Physical Exam  Temp:  [36 °C (96.8 °F)-36.8 °C (98.2 °F)] 36.7 °C (98.1 °F)  Pulse:  [64-85] 73  Resp:  [16-18] 16  BP: (115-174)/(52-73) 142/68    Physical Exam   Constitutional: She appears well-developed.   Frail, thin   HENT:   Head: Normocephalic.   Mouth/Throat: Oropharynx is clear and moist.   Eyes: Conjunctivae are normal. Right eye exhibits no discharge. Left eye exhibits no discharge.   Cardiovascular: Normal rate and regular rhythm.    Pulmonary/Chest: Effort normal. She has no wheezes. She has no rales.   Abdominal: Soft. There is no tenderness. There is no rebound and no guarding.   Musculoskeletal:   Clean right hip dressing in place   Neurological: She is alert.   Oriented to self and place   Skin: Skin is warm and dry.   Nursing note and vitals reviewed.      Fluids  No intake or output data in  the 24 hours ending 12/18/18 1414    Laboratory  Recent Labs      12/16/18   0523  12/16/18   1239  12/18/18   0336   WBC  7.8  8.9  9.7   RBC  3.33*  3.65*  3.37*   HEMOGLOBIN  9.8*  10.9*  10.2*   HEMATOCRIT  30.8*  33.5*  30.2*   MCV  92.5  91.8  89.6   MCH  29.4  29.9  30.3   MCHC  31.8*  32.5*  33.8   RDW  46.1  46.5  46.1   PLATELETCT  271  357  349   MPV  10.0  9.8  9.5                       Imaging  DX-PORTABLE FLUORO > 1 HOUR   Final Result         Portable fluoroscopy utilized for 1 minute 6 seconds.         DX-HIP-UNILATERAL-WITH PELVIS-1 VIEW RIGHT   Final Result      Status post recent open reduction internal fixation of right intertrochanteric femur fracture with fracture fragments in near-anatomic alignment.      DX-HIP-UNILATERAL-W/O PELVIS-2/3 VIEWS RIGHT   Final Result      Digitized intraoperative radiograph is submitted for review.  This examination is not for diagnostic purpose but for guidance during a surgical procedure.      CT-HIP W/O PLUS RECONS RIGHT   Final Result      1.  Acute basicervical RIGHT proximal femur fracture extending into the intertrochanteric region with associated impaction and mild apex anterior angulation.   2.  No RIGHT hip dislocation.      DX-CHEST-PORTABLE (1 VIEW)   Final Result      Minimal RIGHT lung base atelectasis.      DX-FEMUR-2+ RIGHT   Final Result            Acute displaced intertrochanteric fracture of the right femur.      DX-PELVIS-1 OR 2 VIEWS   Final Result         Acute displaced intertrochanteric fracture of the right femur.           Assessment/Plan  * Closed intertrochanteric fracture of right femur (HCC)- (present on admission)   Assessment & Plan    S/P right hip nailing on 12/11  Continue heparin  Pain controlled.  PT/OT following.  Ortho following.  Patient wanting to go home and plan for hospice  PTOT following to ensure some improvement in functional mobility       Frailty syndrome in geriatric patient- (present on admission)   Assessment &  Plan    Nutrition following.   Encourage oral intake.  PT/OT following.  Anticipate home with hospice at d/c.     Type 2 diabetes mellitus with kidney complication, without long-term current use of insulin ([facility])- (present on admission)   Assessment & Plan    Diet-controlled     Frequent falls- (present on admission)   Assessment & Plan    PT/OT following.     Hypokalemia- (present on admission)   Assessment & Plan    Replaced.  Follow bmp.       Closed intertrochanteric fracture of left femur ([facility])- (present on admission)   Assessment & Plan    s/p IM nailing 11/18/2018  PT/OT     Chronic kidney disease, stage III (moderate) ([facility])- (present on admission)   Assessment & Plan    Follow BMP     Vitamin D insufficiency- (present on admission)   Assessment & Plan    Oral replacement.     HTN (hypertension)- (present on admission)   Assessment & Plan    Improved.  Continue Lisinopril, Norvasc, and metoprolol.     COPD (chronic obstructive pulmonary disease) ([facility])- (present on admission)   Assessment & Plan    No acute exacerbation.  RT protocol, encourage IS     Asthma, moderate persistent, poorly-controlled- (present on admission)   Assessment & Plan    Not in acute exacerbation.  Continue inhalers and RT protocol.     Acute blood loss anemia   Assessment & Plan    Likely secondary to surgery   Currently with no evidence of active bleed.  Replacing folic acid and iron.  Follow CBC.  Transfuse for hgb < 7     Iron deficiency   Assessment & Plan    Start replacement.       Folic acid deficiency   Assessment & Plan    Continue oral replacement.     Depression- (present on admission)   Assessment & Plan    Zoloft          VTE prophylaxis: heparin

## 2018-12-18 NOTE — CARE PLAN
Problem: Communication  Goal: The ability to communicate needs accurately and effectively will improve  Outcome: PROGRESSING AS EXPECTED  Patient communicates clearly.      Problem: Safety  Goal: Will remain free from injury  Outcome: PROGRESSING AS EXPECTED  Patient uses call bell appropriately.      Problem: Pain Management  Goal: Pain level will decrease to patient's comfort goal  Outcome: PROGRESSING AS EXPECTED  Pain is managed with PRN medication see flowsheets and MAR.

## 2018-12-18 NOTE — PROGRESS NOTES
"   Orthopaedic Progress Note    Interval changes:  Dressing with no new exudate noted  Cleared by ortho for DC home with hospice pending medicine and therapy clearance    ROS - Patient denies any new issues.  Pain well controlled.    Blood pressure 117/74, pulse 84, temperature 36.1 °C (96.9 °F), temperature source Temporal, resp. rate 19, height 1.6 m (5' 3\"), weight 59 kg (130 lb 1.1 oz), SpO2 93 %, not currently breastfeeding.      Patient seen and examined  No acute distress  Breathing non labored  RRR  Right hip dressing with min sanguinous exudate proximally, DNVI, moves all toes, cap refill < 2 sec.     Recent Labs      12/16/18   0523  12/16/18   1239   WBC  7.8  8.9   RBC  3.33*  3.65*   HEMOGLOBIN  9.8*  10.9*   HEMATOCRIT  30.8*  33.5*   MCV  92.5  91.8   MCH  29.4  29.9   MCHC  31.8*  32.5*   RDW  46.1  46.5   PLATELETCT  271  357   MPV  10.0  9.8       Active Hospital Problems    Diagnosis   • Closed intertrochanteric fracture of right femur ([facility]) [S72.141A]     Priority: High   • Frailty syndrome in geriatric patient [R54]     Priority: High   • Frequent falls [R29.6]     Priority: Medium   • Type 2 diabetes mellitus with kidney complication, without long-term current use of insulin ([facility]) [E11.29]     Priority: Medium   • Hypokalemia [E87.6]     Priority: Medium   • Closed intertrochanteric fracture of left femur ([facility]) [S72.142A]     Priority: Medium   • Urinary retention [R33.9]     Priority: Medium   • Chronic kidney disease, stage III (moderate) ([facility]) [N18.3]     Priority: Medium   • COPD (chronic obstructive pulmonary disease) ([facility]) [J44.9]     Priority: Medium   • HTN (hypertension) [I10]     Priority: Medium   • Vitamin D insufficiency [E55.9]     Priority: Medium   • Asthma, moderate persistent, poorly-controlled [J45.40]     Priority: Medium   • Iron deficiency [E61.1]     Priority: Low   • Folic acid deficiency [E53.8]     Priority: Low   • Depression [F32.9]     Priority: Low   • Acute blood " loss anemia [D62]       Assessment/Plan:  Pending therapy clearance for DC home  POD#6 S/P Right intramedullary hip screw  Wt bearing status - WBAT  Wound care/Drains - dressing left in place  Future Procedures - none planned   Sutures/Staples out- 10-14 days post operatively  PT/OT-initiated  Antibiotics: completed  DVT Prophylaxis- TEDS/SCDs/Foot pumps  Avery-none  Case Coordination for Discharge Planning - Disposition home if cleared by therapy

## 2018-12-18 NOTE — CARE PLAN
Problem: Safety  Goal: Will remain free from falls  Call light and belongings within reach, bed down and locked, hourly rounding in progress. Bed alarm in use, treaded socks when OOB.    Problem: Venous Thromboembolism (VTW)/Deep Vein Thrombosis (DVT) Prevention:  Goal: Patient will participate in Venous Thrombosis (VTE)/Deep Vein Thrombosis (DVT)Prevention Measures  Refuses scd's, heparin subq

## 2018-12-19 VITALS
DIASTOLIC BLOOD PRESSURE: 78 MMHG | SYSTOLIC BLOOD PRESSURE: 152 MMHG | OXYGEN SATURATION: 95 % | WEIGHT: 130.07 LBS | TEMPERATURE: 98.5 F | HEART RATE: 98 BPM | BODY MASS INDEX: 23.05 KG/M2 | HEIGHT: 63 IN | RESPIRATION RATE: 17 BRPM

## 2018-12-19 PROBLEM — E87.6 HYPOKALEMIA: Status: RESOLVED | Noted: 2018-11-20 | Resolved: 2018-12-19

## 2018-12-19 PROCEDURE — A9270 NON-COVERED ITEM OR SERVICE: HCPCS | Performed by: NURSE PRACTITIONER

## 2018-12-19 PROCEDURE — 97530 THERAPEUTIC ACTIVITIES: CPT

## 2018-12-19 PROCEDURE — 700102 HCHG RX REV CODE 250 W/ 637 OVERRIDE(OP): Performed by: FAMILY MEDICINE

## 2018-12-19 PROCEDURE — 97535 SELF CARE MNGMENT TRAINING: CPT

## 2018-12-19 PROCEDURE — A9270 NON-COVERED ITEM OR SERVICE: HCPCS | Performed by: FAMILY MEDICINE

## 2018-12-19 PROCEDURE — 99239 HOSP IP/OBS DSCHRG MGMT >30: CPT | Performed by: INTERNAL MEDICINE

## 2018-12-19 PROCEDURE — 700102 HCHG RX REV CODE 250 W/ 637 OVERRIDE(OP): Performed by: HOSPITALIST

## 2018-12-19 PROCEDURE — 700102 HCHG RX REV CODE 250 W/ 637 OVERRIDE(OP): Performed by: NURSE PRACTITIONER

## 2018-12-19 PROCEDURE — A9270 NON-COVERED ITEM OR SERVICE: HCPCS | Performed by: HOSPITALIST

## 2018-12-19 PROCEDURE — 700111 HCHG RX REV CODE 636 W/ 250 OVERRIDE (IP): Performed by: FAMILY MEDICINE

## 2018-12-19 RX ORDER — FOLIC ACID 1 MG/1
1 TABLET ORAL DAILY
Qty: 30 TAB | Refills: 0 | Status: SHIPPED | OUTPATIENT
Start: 2018-12-20

## 2018-12-19 RX ORDER — FERROUS SULFATE 325(65) MG
325 TABLET ORAL
Qty: 30 TAB | Refills: 0 | Status: SHIPPED | OUTPATIENT
Start: 2018-12-19

## 2018-12-19 RX ORDER — ERGOCALCIFEROL 1.25 MG/1
50000 CAPSULE ORAL
Qty: 30 CAP | Refills: 0 | Status: SHIPPED | OUTPATIENT
Start: 2018-12-21

## 2018-12-19 RX ORDER — OXYCODONE HYDROCHLORIDE 5 MG/1
5 TABLET ORAL EVERY 8 HOURS PRN
Qty: 10 TAB | Refills: 0 | Status: SHIPPED | OUTPATIENT
Start: 2018-12-19 | End: 2018-12-26

## 2018-12-19 RX ORDER — ASCORBIC ACID 500 MG
500 TABLET ORAL DAILY
Qty: 30 TAB | Refills: 3 | Status: SHIPPED | OUTPATIENT
Start: 2018-12-20

## 2018-12-19 RX ORDER — PYRIDOXINE HCL (VITAMIN B6) 50 MG
50 TABLET ORAL DAILY
Qty: 30 TAB | Refills: 11 | Status: SHIPPED | OUTPATIENT
Start: 2018-12-20

## 2018-12-19 RX ADMIN — STANDARDIZED SENNA CONCENTRATE AND DOCUSATE SODIUM 2 TABLET: 8.6; 5 TABLET, FILM COATED ORAL at 05:03

## 2018-12-19 RX ADMIN — FOLIC ACID 1 MG: 1 TABLET ORAL at 05:01

## 2018-12-19 RX ADMIN — OXYCODONE HYDROCHLORIDE 5 MG: 5 TABLET ORAL at 12:07

## 2018-12-19 RX ADMIN — SERTRALINE 200 MG: 100 TABLET, FILM COATED ORAL at 08:14

## 2018-12-19 RX ADMIN — AMLODIPINE BESYLATE 5 MG: 5 TABLET ORAL at 05:01

## 2018-12-19 RX ADMIN — LISINOPRIL 60 MG: 20 TABLET ORAL at 05:01

## 2018-12-19 RX ADMIN — CYANOCOBALAMIN TAB 500 MCG 1000 MCG: 500 TAB at 05:01

## 2018-12-19 RX ADMIN — PREDNISONE 5 MG: 5 TABLET ORAL at 08:14

## 2018-12-19 RX ADMIN — Medication 50 MG: at 05:03

## 2018-12-19 RX ADMIN — HEPARIN SODIUM 5000 UNITS: 5000 INJECTION, SOLUTION INTRAVENOUS; SUBCUTANEOUS at 05:03

## 2018-12-19 RX ADMIN — OXYCODONE HYDROCHLORIDE 10 MG: 10 TABLET ORAL at 00:37

## 2018-12-19 RX ADMIN — OXYCODONE HYDROCHLORIDE 5 MG: 5 TABLET ORAL at 08:14

## 2018-12-19 RX ADMIN — METOPROLOL TARTRATE 12.5 MG: 25 TABLET, FILM COATED ORAL at 05:01

## 2018-12-19 RX ADMIN — FERROUS SULFATE TAB 325 MG (65 MG ELEMENTAL FE) 325 MG: 325 (65 FE) TAB at 08:14

## 2018-12-19 RX ADMIN — OXYCODONE HYDROCHLORIDE AND ACETAMINOPHEN 500 MG: 500 TABLET ORAL at 05:01

## 2018-12-19 ASSESSMENT — PAIN SCALES - GENERAL
PAINLEVEL_OUTOF10: 5
PAINLEVEL_OUTOF10: 5
PAINLEVEL_OUTOF10: 0
PAINLEVEL_OUTOF10: 7
PAINLEVEL_OUTOF10: 0
PAINLEVEL_OUTOF10: 3
PAINLEVEL_OUTOF10: 0

## 2018-12-19 ASSESSMENT — COGNITIVE AND FUNCTIONAL STATUS - GENERAL
TURNING FROM BACK TO SIDE WHILE IN FLAT BAD: A LITTLE
WALKING IN HOSPITAL ROOM: A LOT
STANDING UP FROM CHAIR USING ARMS: A LITTLE
DAILY ACTIVITIY SCORE: 14
TOILETING: TOTAL
MOBILITY SCORE: 15
HELP NEEDED FOR BATHING: A LOT
PERSONAL GROOMING: A LITTLE
SUGGESTED CMS G CODE MODIFIER MOBILITY: CK
SUGGESTED CMS G CODE MODIFIER DAILY ACTIVITY: CK
MOVING TO AND FROM BED TO CHAIR: A LITTLE
DRESSING REGULAR LOWER BODY CLOTHING: TOTAL
DRESSING REGULAR UPPER BODY CLOTHING: A LITTLE
CLIMB 3 TO 5 STEPS WITH RAILING: TOTAL
MOVING FROM LYING ON BACK TO SITTING ON SIDE OF FLAT BED: A LITTLE

## 2018-12-19 NOTE — DISCHARGE PLANNING
Anticipated Discharge Disposition: Home with [facility]    Action: LSW contacted Pt dtr to update on transportation time.     Barriers to Discharge: None    Plan: Home with [facility]

## 2018-12-19 NOTE — DISCHARGE PLANNING
Received Transport Form @ 4897  Spoke to Shadi @ [facility]    Transport is scheduled for [facility] @1200 going to Home.

## 2018-12-19 NOTE — PROGRESS NOTES
Pt. D/C'd home via [facility].  Discharge instructions provided to pt.  Pt states understanding.  Pt states all questions have been answered.  Copy of discharge provided to pt.  Signed copy in chart.  Prescriptions provided to pt, copy in chart. Pt states that all personal belongings are in possession. Pt escorted off unit with assistance from [facility] without incident.

## 2018-12-19 NOTE — THERAPY
"Occupational Therapy Treatment completed with focus on ADLs, ADL transfers, patient education and caregiver training.  Functional Status: Rolling using bed rails SBA to L, supine > EOB min A, stand-pivot transfers with FWW CGA, gamal hygiene max A  Plan of Care: Will benefit from Occupational Therapy 3 times per week  Discharge Recommendations:  Equipment No Equipment Needed. Post-acute therapy: Recommend inpatient transitional care services for continued occupational therapy services.     See \"Rehab Therapy-Acute\" Patient Summary Report for complete documentation.     Pt seen for OT tx with emphasis on CG training. Discussed possible use of bedpan at home if pt has difficulty with commode transfer. Educated pt's daughter on placement of bedpan. Pt was able to roll using bed rail (to L) without assist. Daughter plans to obtain bed rail for personal bed at home (does not want hospital bed). Pt mobilized to EOB via roll to L with min A to manage BLE. Good use of UB and trunk. Pt completed stand-pivot using FWW to BSC with only CGA. Attempted to void but unable. Pt had been incontinent of urine at start of tx. Stated \"it comes on so quick\". Educate pt on attempting to void frequently and calling for linen change if she is incontinent to facilitate skin integrity. Pt completed stand-pivot from BSC to recliner with close CGA. Set-up for oral care. Daughter assisted with hair brushing. Discussed safety strategies for home to mitigate falls. Educated daughter on recommendation for tab alarm that would activate when pt rolls over versus strip alarm that would not activate until pt began standing. Educated on purchase options for bed rails, bed alarms, incontinence pads. Pt demos improved transfers this session using FWW. Daughter observed all mobility, but did not provide solo guarding for transfer. PT to see pt today to finalize CG training prior to DC home. If pt remains in-house, OT will continue to follow.   "

## 2018-12-19 NOTE — CARE PLAN
Problem: Communication  Goal: The ability to communicate needs accurately and effectively will improve  Outcome: MET Date Met: 12/18/18  Patient uses call light as necessary to contact medical staff    Problem: Safety  Goal: Will remain free from falls  Outcome: MET Date Met: 12/18/18  Bed locked in lowest position, call light within reach, top two siderails up, in room close to nurses station

## 2018-12-19 NOTE — DISCHARGE PLANNING
Anticipated Discharge Disposition: Home with [facility].     Action: Discussed pt's case with PT and [facility] RN. Family at bedside and will be completing another session with PT today and plan for home with hospice tomorrow 12/19/18.    Barriers to Discharge: N/A.     Plan: As above, anticipate pt can go home tomorrow with family and [facility]. Pt will likely require [facility] transport. Left report for unit RN BRIAN.

## 2018-12-19 NOTE — CARE PLAN
Problem: Safety  Goal: Will remain free from injury  Outcome: PROGRESSING AS EXPECTED  Treaded socks in place, bed in the lowest position, bed alarm on, call light and belongings within reach, pt call for assistance appropriately    Problem: Skin Integrity  Goal: Risk for impaired skin integrity will decrease  Outcome: PROGRESSING AS EXPECTED  q2 turns, waffle cushion in place, applied barrier cream

## 2018-12-19 NOTE — PROGRESS NOTES
"   Orthopaedic PA Progress Note    Interval changes:Did well overnight, tentatively to home/ hospice todrolando    ROS - Patient denies any new issues. No chest pain, dyspnea, or fever.  Pain well controlled.    Blood pressure 152/78, pulse 98, temperature 36.9 °C (98.5 °F), temperature source Temporal, resp. rate 17, height 1.6 m (5' 3\"), weight 59 kg (130 lb 1.1 oz), SpO2 95 %, not currently breastfeeding.    Patient seen and examined  No acute distress  Breathing non labored  RRR  Surgical dressing is clean, dry, and intact. Patient clearly fires tibialis anterior, EHL, and gastrocnemius/soleus. Sensation is intact to light touch throughout superficial peroneal, deep peroneal, tibial, saphenous, and sural nerve distributions. Strong and palpable 2+ dorsalis pedis and posterior tibial pulses with capillary refill less than 2 seconds. No lower leg tenderness or discomfort.    Recent Labs      12/16/18   1239  12/18/18   0336   WBC  8.9  9.7   RBC  3.65*  3.37*   HEMOGLOBIN  10.9*  10.2*   HEMATOCRIT  33.5*  30.2*   MCV  91.8  89.6   MCH  29.9  30.3   MCHC  32.5*  33.8   RDW  46.5  46.1   PLATELETCT  357  349   MPV  9.8  9.5     Active Hospital Problems    Diagnosis   • Closed intertrochanteric fracture of right femur ([facility]) [S72.141A]     Priority: High   • Frailty syndrome in geriatric patient [R54]     Priority: High   • Frequent falls [R29.6]     Priority: Medium   • Type 2 diabetes mellitus with kidney complication, without long-term current use of insulin ([facility]) [E11.29]     Priority: Medium   • Closed intertrochanteric fracture of left femur ([facility]) [S72.142A]     Priority: Medium   • Chronic kidney disease, stage III (moderate) ([facility]) [N18.3]     Priority: Medium   • COPD (chronic obstructive pulmonary disease) ([facility]) [J44.9]     Priority: Medium   • HTN (hypertension) [I10]     Priority: Medium   • Vitamin D insufficiency [E55.9]     Priority: Medium   • Asthma, moderate persistent, poorly-controlled [J45.40]     " Priority: Medium   • Iron deficiency [E61.1]     Priority: Low   • Folic acid deficiency [E53.8]     Priority: Low   • Depression [F32.9]     Priority: Low   • Acute blood loss anemia [D62]        Assessment/Plan:  Pending therapy clearance for DC home  POD#8 S/P Right intramedullary hip screw  Wt bearing status - WBAT  Wound care/Drains - dressing left in place  Future Procedures - none planned   Sutures/Staples out- 10-14 days post operatively  PT/OT-initiated  Antibiotics: completed  DVT Prophylaxis- TEDS/SCDs/Foot pumps  Avery-none  Case Coordination for Discharge Planning - Disposition home with hospice if cleared by therapy   Follow-Up: needs appointment with Dr. Middleton at [facility] at 10-14 days post-op for re-evaluation, staple removal and radiographs.

## 2018-12-19 NOTE — DISCHARGE INSTRUCTIONS
Discharge Instructions    Discharged to home by ambulance with escort. Discharged via [facility] escort: Yes.  Special equipment needed: Not Applicable    Be sure to schedule a follow-up appointment with your primary care doctor or any specialists as instructed.   Follow up with Dr. Middleton with in a week  Take medications as prescribed  For any questions or concerns contact MD    Discharge Plan:   Influenza Vaccine Indication: Not indicated: Previously immunized this influenza season and > 8 years of age    I understand that a diet low in cholesterol, fat, and sodium is recommended for good health. Unless I have been given specific instructions below for another diet, I accept this instruction as my diet prescription.   Other diet: Regular diet    Special Instructions: Discharge instructions for the Orthopedic Patient    Follow up with Primary Care Physician within 2 weeks of discharge to home, regarding:  Review of medications and diagnostic testing.  Surveillance for medical complications.  Workup and treatment of osteoporosis, if appropriate.     -Is this a Joint Replacement patient? No    -Is this patient being discharged with medication to prevent blood clots?  No    · Is patient discharged on Warfarin / Coumadin?   No       Depression / Suicide Risk    As you are discharged from this [facility] facility, it is important to learn how to keep safe from harming yourself.    Recognize the warning signs:  · Abrupt changes in personality, positive or negative- including increase in energy   · Giving away possessions  · Change in eating patterns- significant weight changes-  positive or negative  · Change in sleeping patterns- unable to sleep or sleeping all the time   · Unwillingness or inability to communicate  · Depression  · Unusual sadness, discouragement and loneliness  · Talk of wanting to die  · Neglect of personal appearance   · Rebelliousness- reckless behavior  · Withdrawal from people/activities  they love  · Confusion- inability to concentrate     If you or a loved one observes any of these behaviors or has concerns about self-harm, here's what you can do:  · Talk about it- your feelings and reasons for harming yourself  · Remove any means that you might use to hurt yourself (examples: pills, rope, extension cords, firearm)  · Get professional help from the community (Mental Health, Substance Abuse, psychological counseling)  · Do not be alone:Call your Safe Contact- someone whom you trust who will be there for you.  · Call your local CRISIS HOTLINE 734-1851 or 762-836-3678  · Call your local Children's Mobile Crisis Response Team [place] (732) 148-0914 or www.MOBEXO  · Call the toll free National Suicide Prevention Hotlines   · National Suicide Prevention Lifeline 198-504-HBNC (5333)  · National Hope Line Network 800-SUICIDE (873-4665)

## 2018-12-19 NOTE — DISCHARGE SUMMARY
Discharge Summary    CHIEF COMPLAINT ON ADMISSION  Chief Complaint   Patient presents with   • T-5000 GLF     Pt reports getting up this morning to use the restroom when she fell, hurting her right hip. -LOC   • Hip Pain     Right hip pain       Reason for Admission  Fall    Admission Date  12/11/2018    CODE STATUS  DNAR/DNI    HPI & HOSPITAL COURSE  This is a 89 y.o. female here with fall.     PMH depression, HTN, asthma and recent left femur fracture who had not been doing well and placed on hospice. She presented with new fall and right hip pain. She was found with right proximal femur fracture. Dr. Middleton was consulted and she had right hip nailing 12/11. She's been slow to mobilize post-op. PT and OT recommened SNF, however daughter elected to take patient home with hospice. She was accepted by [facility].    Therefore, she is discharged in good and stable condition to hospice.    The patient met 2-midnight criteria for an inpatient stay at the time of discharge.    Discharge Date  12/19/2018    FOLLOW UP ITEMS POST DISCHARGE  F/u Dr. Middleton 1-2 weeks for sutures/staple removal    DISCHARGE DIAGNOSES  Principal Problem:    Closed intertrochanteric fracture of right femur (HCC) (Chronic) POA: Yes  Active Problems:    Frailty syndrome in geriatric patient POA: Yes    Asthma, moderate persistent, poorly-controlled POA: Yes    COPD (chronic obstructive pulmonary disease) ([facility]) POA: Yes    HTN (hypertension) POA: Yes    Vitamin D insufficiency POA: Yes    Chronic kidney disease, stage III (moderate) ([facility]) POA: Yes    Closed intertrochanteric fracture of left femur ([facility]) POA: Yes    Frequent falls POA: Yes    Type 2 diabetes mellitus with kidney complication, without long-term current use of insulin ([facility]) POA: Yes    Depression POA: Yes    Folic acid deficiency POA: Yes    Iron deficiency POA: Yes    Acute blood loss anemia POA: No  Resolved Problems:    Urinary retention POA: Yes    Hypokalemia POA:  Yes      FOLLOW UP  [facility]  [address] [facility] [address]  [place] [address]  [phone]        Pardeep Middleton M.D.  [address]  [address]  [facility] [address]  [phone]      follow up      MEDICATIONS ON DISCHARGE     Medication List      START taking these medications      Instructions   ascorbic acid 500 MG tablet  Start taking on:  12/20/2018  Commonly known as:  VITAMIN C   Take 1 Tab by mouth every day.  Dose:  500 mg     cyanocobalamin 1000 MCG Tabs  Start taking on:  12/20/2018  Commonly known as:  VITAMIN B12   Take 1 Tab by mouth every day.  Dose:  1000 mcg     ferrous sulfate 325 (65 Fe) MG tablet   Take 1 Tab by mouth every morning with breakfast.  Dose:  325 mg     folic acid 1 MG Tabs  Start taking on:  12/20/2018  Commonly known as:  FOLVITE   Take 1 Tab by mouth every day.  Dose:  1 mg     oxyCODONE immediate-release 5 MG Tabs  Commonly known as:  ROXICODONE   Take 1 Tab by mouth every 8 hours as needed for Severe Pain for up to 7 days.  Dose:  5 mg     pyridoxine 50 MG Tabs  Start taking on:  12/20/2018  Commonly known as:  VITAMIN B-6   Take 1 Tab by mouth every day.  Dose:  50 mg     vitamin D (Ergocalciferol) 78303 units Caps capsule  Start taking on:  12/21/2018  Commonly known as:  DRISDOL   Take 1 Cap by mouth every 7 days.  Dose:  11397 Units        CONTINUE taking these medications      Instructions   albuterol 108 (90 Base) MCG/ACT Aers inhalation aerosol   Inhale 2 Puffs by mouth every 6 hours as needed for Shortness of Breath.  Dose:  2 Puff     amLODIPine 5 MG Tabs  Commonly known as:  NORVASC   Take 1 Tab by mouth 2 Times a Day.  Dose:  5 mg     buPROPion  MG Tb12  Commonly known as:  WELLBUTRIN-SR   Take 100 mg by mouth 2 times a day.  Dose:  100 mg     hydrochlorothiazide 12.5 MG capsule  Commonly known as:  MICROZIDE   Take 12.5 mg by mouth every day.  Dose:  12.5 mg     lisinopril 30 MG tablet  Commonly known as:  PRINIVIL, ZESTRIL   Take 2 Tabs  by mouth every morning.  Dose:  60 mg     melatonin 3 MG Tabs   Take 3 mg by mouth every bedtime.  Dose:  3 mg     polyethylene glycol/lytes Pack  Commonly known as:  MIRALAX   Take 1 Packet by mouth 1 time daily as needed (if sennosides and docusate ineffective after 24 hours).  Dose:  17 g     predniSONE 5 MG Tabs  Commonly known as:  DELTASONE   Take 1 Tab by mouth every morning with breakfast.  Dose:  5 mg     senna-docusate 8.6-50 MG Tabs  Commonly known as:  PERICOLACE or SENOKOT S   Take 2 Tabs by mouth 2 Times a Day.  Dose:  2 Tab     sertraline 100 MG Tabs  Commonly known as:  ZOLOFT   Take 2 Tabs by mouth every day.  Dose:  200 mg        STOP taking these medications    NS SOLN 100 mL with cefTRIAXone 2 GM SOLR 2 g            Allergies  Allergies   Allergen Reactions   • Codeine Nausea       DIET  Orders Placed This Encounter   Procedures   • Diet Order Regular     Standing Status:   Standing     Number of Occurrences:   1     Order Specific Question:   Diet:     Answer:   Regular [1]       ACTIVITY  As tolerated.  Weight bearing as tolerated    CONSULTATIONS  Ortho    PROCEDURES    Procedure(s):  HIP NAILING INTRAMEDULLARY - Wound Class: Clean    DX-PORTABLE FLUORO > 1 HOUR   Final Result         Portable fluoroscopy utilized for 1 minute 6 seconds.         DX-HIP-UNILATERAL-WITH PELVIS-1 VIEW RIGHT   Final Result      Status post recent open reduction internal fixation of right intertrochanteric femur fracture with fracture fragments in near-anatomic alignment.      DX-HIP-UNILATERAL-W/O PELVIS-2/3 VIEWS RIGHT   Final Result      Digitized intraoperative radiograph is submitted for review.  This examination is not for diagnostic purpose but for guidance during a surgical procedure.      CT-HIP W/O PLUS RECONS RIGHT   Final Result      1.  Acute basicervical RIGHT proximal femur fracture extending into the intertrochanteric region with associated impaction and mild apex anterior angulation.   2.  No RIGHT  hip dislocation.      DX-CHEST-PORTABLE (1 VIEW)   Final Result      Minimal RIGHT lung base atelectasis.      DX-FEMUR-2+ RIGHT   Final Result            Acute displaced intertrochanteric fracture of the right femur.      DX-PELVIS-1 OR 2 VIEWS   Final Result         Acute displaced intertrochanteric fracture of the right femur.            LABORATORY  Lab Results   Component Value Date    SODIUM 133 (L) 12/14/2018    POTASSIUM 4.1 12/14/2018    CHLORIDE 99 12/14/2018    CO2 30 12/14/2018    GLUCOSE 156 (H) 12/14/2018    BUN 11 12/14/2018    CREATININE 0.69 12/14/2018    CREATININE 1.22 (H) 11/03/2012        Lab Results   Component Value Date    WBC 9.7 12/18/2018    WBC 7.0 06/26/2012    HEMOGLOBIN 10.2 (L) 12/18/2018    HEMATOCRIT 30.2 (L) 12/18/2018    PLATELETCT 349 12/18/2018        Total time of the discharge process exceeds 32 minutes.

## 2018-12-19 NOTE — PROGRESS NOTES
· 2 RN skin check complete.   · Bruising on BUE, gamal area red and blanching, RLE has bruising around incision site. Incision clean, dry, and intact.  · The following interventions in place Q2H turns, Waffle cushion, pillows in use for support and positioning

## 2018-12-19 NOTE — PROGRESS NOTES
· 2 RN skin check complete.   · Devices in place none  · Skin assessed under devices n/a.  · Confirmed pressure ulcers found on none  · The following interventions in place, q 2 turns, waffle cushion in place

## 2018-12-19 NOTE — DISCHARGE PLANNING
Anticipated Discharge Disposition: Home with [facility]    Action: LSW contacted RN Vani at [facility] and was asked to arrange [facility] transport at 12-1pm. LSW completed [facility] and transport form and faxed to [facility]    Barriers to Discharge: n/a    Plan: Discharge to on [facility], transport by [facility].

## 2018-12-20 NOTE — THERAPY
"Physical Therapy Treatment completed.   Bed Mobility:  Supine to Sit: Minimal Assist  Transfers: Sit to Stand: Contact Guard Assist  Gait: Level Of Assist: Moderate Assist (x2 people) with Front-Wheel Walker       Plan of Care: Will benefit from Physical Therapy 3 times per week  Discharge Recommendations: Equipment: Will Continue to Assess for Equipment Needs.    See \"Rehab Therapy-Acute\" Patient Summary Report for complete documentation.     Pt is progressing as expected making gradual improvements in functional mobility. Pt was seen for caregiver and family training for safe mobilization and transfers at home. Pt continues to present with impaired balance, impaired gait, pain, and dec activity tolerance. Pt was able to demosntrate CGA for all functional mobility at this time w/FWW use. Pt is only able to participate in transfers at this time. Daughter educated on home set up to reduce risk of falling or tripping. Daughter educated on w/c management at home if pt does want to become more funtionally mobile; educated on parts of w/c. Daughter educated in transfer trianing; dtr was able to demonstrate approprate mechanics with safe guarding during transfers, required occasional cues at first, however, second attempt no cues required. Pt will continue to benefit from skilled PT while in house, if pt is to d/c home with hospice and family support pt will require 24/7 assist during transfers at home and should be able to mobilize at w/c level only.   "

## 2020-12-16 NOTE — PROGRESS NOTES
"   Orthopaedic Progress Note    Interval changes:  Dressing without change  Cleared by ortho for DC home with hospice pending medicine and therapy clearance    ROS - Patient denies any new issues.  Pain well controlled.    Blood pressure 142/68, pulse 73, temperature 36.7 °C (98.1 °F), temperature source Temporal, resp. rate 16, height 1.6 m (5' 3\"), weight 59 kg (130 lb 1.1 oz), SpO2 97 %, not currently breastfeeding.      Patient seen and examined  No acute distress  Breathing non labored  RRR  Right hip dressing with dried sanguinous exudate proximally, DNVI, moves all toes, cap refill < 2 sec.     Recent Labs      12/16/18   0523  12/16/18   1239  12/18/18   0336   WBC  7.8  8.9  9.7   RBC  3.33*  3.65*  3.37*   HEMOGLOBIN  9.8*  10.9*  10.2*   HEMATOCRIT  30.8*  33.5*  30.2*   MCV  92.5  91.8  89.6   MCH  29.4  29.9  30.3   MCHC  31.8*  32.5*  33.8   RDW  46.1  46.5  46.1   PLATELETCT  271  357  349   MPV  10.0  9.8  9.5       Active Hospital Problems    Diagnosis   • Closed intertrochanteric fracture of right femur ([facility]) [S72.141A]     Priority: High   • Frailty syndrome in geriatric patient [R54]     Priority: High   • Frequent falls [R29.6]     Priority: Medium   • Type 2 diabetes mellitus with kidney complication, without long-term current use of insulin ([facility]) [E11.29]     Priority: Medium   • Hypokalemia [E87.6]     Priority: Medium   • Closed intertrochanteric fracture of left femur ([facility]) [S72.142A]     Priority: Medium   • Urinary retention [R33.9]     Priority: Medium   • Chronic kidney disease, stage III (moderate) ([facility]) [N18.3]     Priority: Medium   • COPD (chronic obstructive pulmonary disease) ([facility]) [J44.9]     Priority: Medium   • HTN (hypertension) [I10]     Priority: Medium   • Vitamin D insufficiency [E55.9]     Priority: Medium   • Asthma, moderate persistent, poorly-controlled [J45.40]     Priority: Medium   • Iron deficiency [E61.1]     Priority: Low   • Folic acid deficiency [E53.8]     " Priority: Low   • Depression [F32.9]     Priority: Low   • Acute blood loss anemia [D62]       Assessment/Plan:  Pending therapy clearance for DC home  POD#7 S/P Right intramedullary hip screw  Wt bearing status - WBAT  Wound care/Drains - dressing left in place  Future Procedures - none planned   Sutures/Staples out- 10-14 days post operatively  PT/OT-initiated  Antibiotics: completed  DVT Prophylaxis- TEDS/SCDs/Foot pumps  Avery-none  Case Coordination for Discharge Planning - Disposition home with hospice if cleared by therapy    yes

## 2021-01-11 DIAGNOSIS — Z23 NEED FOR VACCINATION: ICD-10-CM

## 2023-04-14 NOTE — ASSESSMENT & PLAN NOTE
Chronic condition managed with current medical regimen  Stable per review  Unable to recall last UTI, states occurs many times a year  Followed by Roberto Cota M.D..        14-Apr-2023 12:54

## (undated) DEVICE — WIRE GUIDE 3.2MM 400MM (2TX10+1TX4+2TX3=30)

## (undated) DEVICE — BIT DRILL INTERTAN 4.0 LONG

## (undated) DEVICE — CHLORAPREP 26 ML APPLICATOR - ORANGE TINT(25/CA)

## (undated) DEVICE — SET LEADWIRE 5 LEAD BEDSIDE DISPOSABLE ECG (1SET OF 5/EA)

## (undated) DEVICE — GLOVE BIOGEL INDICATOR SZ 6.5 SURGICAL PF LTX - (50PR/BX 4BX/CA)

## (undated) DEVICE — DETERGENT RENUZYME PLUS 10 OZ PACKET (50/BX)

## (undated) DEVICE — DRAPE C-ARM LARGE 41IN X 74 IN - (10/BX 2BX/CA)

## (undated) DEVICE — KIT ROOM DECONTAMINATION

## (undated) DEVICE — GOWN SURGEONS X-LARGE - DISP. (30/CA)

## (undated) DEVICE — GOWN WARMING STANDARD FLEX - (30/CA)

## (undated) DEVICE — DRAPE SURGICAL U 77X120 - (10/CA)

## (undated) DEVICE — GLOVE BIOGEL SZ 8 SURGICAL PF LTX - (50PR/BX 4BX/CA)

## (undated) DEVICE — NEPTUNE 4 PORT MANIFOLD - (20/PK)

## (undated) DEVICE — KIT ANESTHESIA W/CIRCUIT & 3/LT BAG W/FILTER (20EA/CA)

## (undated) DEVICE — GLOVE BIOGEL SZ 6.5 SURGICAL PF LTX (50PR/BX 4BX/CA)

## (undated) DEVICE — PROTECTOR ULNA NERVE - (36PR/CA)

## (undated) DEVICE — TUBING CLEARLINK DUO-VENT - C-FLO (48EA/CA)

## (undated) DEVICE — SUTURE 0 VICRYL PLUS CT-1 - 36 INCH (36/BX)

## (undated) DEVICE — PACK MAJOR ORTHO - (2EA/CA)

## (undated) DEVICE — BOVIE BLADE COATED - (50/PK)

## (undated) DEVICE — MASK ANESTHESIA ADULT  - (100/CA)

## (undated) DEVICE — DRAPE U ORTHOPEDIC - (10/BX)

## (undated) DEVICE — SUCTION INSTRUMENT YANKAUER BULBOUS TIP W/O VENT (50EA/CA)

## (undated) DEVICE — DRESSING LEUKOMED STERILE 11.75X4IN - (50/CA)

## (undated) DEVICE — SODIUM CHL IRRIGATION 0.9% 1000ML (12EA/CA)

## (undated) DEVICE — ELECTRODE DUAL RETURN W/ CORD - (50/PK)

## (undated) DEVICE — TOWELS CLOTH SURGICAL - (4/PK 20PK/CA)

## (undated) DEVICE — SUTURE 2-0 VICRYL PLUS CT-1 36 (36PK/BX)"

## (undated) DEVICE — HEAD HOLDER JUNIOR/ADULT

## (undated) DEVICE — GLOVE BIOGEL PI INDICATOR SZ 8.5 SURGICAL PF LF - (50PR/BX 4BX/CA)

## (undated) DEVICE — ELECTRODE 850 FOAM ADHESIVE - HYDROGEL RADIOTRNSPRNT (50/PK)

## (undated) DEVICE — DRESSING POST OP BORDER 4 X 10 (5EA/BX)

## (undated) DEVICE — DRAPE SURG STERI-DRAPE 7X11OD - (40EA/CA)

## (undated) DEVICE — GLOVE BIOGEL INDICATOR SZ 8 SURGICAL PF LTX - (50/BX 4BX/CA)

## (undated) DEVICE — CANISTER SUCTION 3000ML MECHANICAL FILTER AUTO SHUTOFF MEDI-VAC NONSTERILE LF DISP  (40EA/CA)

## (undated) DEVICE — WIRE GUIDE R-T TRIGEN 3.2MM (1EA)

## (undated) DEVICE — DRAPE IOBAN II INCISE 23X17 - (10EA/BX 4BX/CA)

## (undated) DEVICE — GVL 3 STAT DISPOSABLE - (10/BX)

## (undated) DEVICE — SET EXTENSION WITH 2 PORTS (48EA/CA) ***PART #2C8610 IS A SUBSTITUTE*****

## (undated) DEVICE — SENSOR SPO2 NEO LNCS ADHESIVE (20/BX) SEE USER NOTES

## (undated) DEVICE — GLOVE BIOGEL SZ 7 SURGICAL PF LTX - (50PR/BX 4BX/CA)

## (undated) DEVICE — LACTATED RINGERS INJ 1000 ML - (14EA/CA 60CA/PF)

## (undated) DEVICE — MASK, LARYNGEAL AIRWAY #4

## (undated) DEVICE — BLOCK

## (undated) DEVICE — DRAPE 36X28IN RAD CARM BND BG - (25/CA) O

## (undated) DEVICE — DRAPE STRLE REG TOWEL 18X24 - (10/BX 4BX/CA)"

## (undated) DEVICE — BIT DRILL 4.2MM THREE FLUTED QC 330MM CALIBRATION 100MM (1TX2=2)

## (undated) DEVICE — SUTURE GENERAL

## (undated) DEVICE — SLEEVE, VASO, THIGH, MED

## (undated) DEVICE — GLOVE BIOGEL INDICATOR SZ 7.5 SURGICAL PF LTX - (50PR/BX 4BX/CA)

## (undated) DEVICE — DRAPE LARGE 3 QUARTER - (20/CA)